# Patient Record
Sex: FEMALE | Race: WHITE | NOT HISPANIC OR LATINO | Employment: OTHER | URBAN - METROPOLITAN AREA
[De-identification: names, ages, dates, MRNs, and addresses within clinical notes are randomized per-mention and may not be internally consistent; named-entity substitution may affect disease eponyms.]

---

## 2017-01-30 ENCOUNTER — TELEPHONE (OUTPATIENT)
Dept: OTOLARYNGOLOGY | Facility: CLINIC | Age: 77
End: 2017-01-30

## 2017-01-30 NOTE — TELEPHONE ENCOUNTER
Reason for Call:  Other Face CT Scan.     Detailed comments: Patient requesting her face CT scan results to be sent to her neurologist  at [facility] in [place] . Please call patient if questions.     Phone Number Patient can be reached at: Home number on file [phone] (home)    Best Time: any    Can we leave a detailed message on this number? YES    Call taken on 1/30/2017 at 11:28 AM by Kelly Trevino

## 2017-02-20 ENCOUNTER — TELEPHONE (OUTPATIENT)
Dept: FAMILY MEDICINE | Facility: CLINIC | Age: 77
End: 2017-02-20

## 2017-02-20 NOTE — TELEPHONE ENCOUNTER
Reason for Call:  Other     Detailed comments: Patient has had a cold/cough for one week and has been taking emergence C, would like to know what cold tablets she can take with diagnosis of parkinson     Phone Number Patient can be reached at: Home number on file [phone] (home)    Best Time:     Can we leave a detailed message on this number? Not Applicable    Call taken on 2/20/2017 at 10:32 AM by Huyen Montelongo

## 2017-02-20 NOTE — TELEPHONE ENCOUNTER
Used micromedex for dayquil & psedophed as pharmacy wasn't able to help her & they both appear ok with sinemet.  Kelsey Arenas RN

## 2017-06-07 ENCOUNTER — TELEPHONE (OUTPATIENT)
Dept: FAMILY MEDICINE | Facility: CLINIC | Age: 77
End: 2017-06-07

## 2017-06-07 DIAGNOSIS — K21.9 GASTROESOPHAGEAL REFLUX DISEASE WITHOUT ESOPHAGITIS: Primary | ICD-10-CM

## 2017-06-08 NOTE — TELEPHONE ENCOUNTER
This patient is overdue for advised follow up, which is why he/she is out of refills.  Overdue for annual wellness exam.  I do not want this patient to go without medication - so one refill has been provided to allow time for appointment scheduling.  Please notify the patient and assist with scheduling follow up with her PCP.

## 2017-06-28 DIAGNOSIS — N39.41 URGENCY INCONTINENCE: ICD-10-CM

## 2017-06-28 RX ORDER — OXYBUTYNIN CHLORIDE 10 MG/1
TABLET, EXTENDED RELEASE ORAL
Qty: 90 TABLET | Refills: 1 | Status: SHIPPED | OUTPATIENT
Start: 2017-06-28 | End: 2017-07-21

## 2017-06-28 NOTE — TELEPHONE ENCOUNTER
Medication Detail      Disp Refills Start End ALISHA   oxybutynin (DITROPAN XL) 10 MG 24 hr tablet 90 tablet 3 4/13/2016  No   Sig: Take 1 tablet (10 mg) by mouth daily   Class: E-Prescribe   Route: Oral   Order: [id number]       Last Office Visit with [email or web address], [facility] or [facility] prescribing provider: 10/28/2016                                         Next 5 appointments (look out 90 days)     Jul 21, 2017 11:20 AM CDT   PHYS[facility] with Magaly Hamilton MD   [facility]    [address] [place] [id number] [phone]

## 2017-07-21 ENCOUNTER — OFFICE VISIT (OUTPATIENT)
Dept: FAMILY MEDICINE | Facility: CLINIC | Age: 77
End: 2017-07-21
Payer: COMMERCIAL

## 2017-07-21 VITALS
WEIGHT: 156 LBS | TEMPERATURE: 98.9 F | DIASTOLIC BLOOD PRESSURE: 90 MMHG | SYSTOLIC BLOOD PRESSURE: 138 MMHG | HEIGHT: 64 IN | BODY MASS INDEX: 26.63 KG/M2 | HEART RATE: 80 BPM

## 2017-07-21 DIAGNOSIS — E83.51 HYPOCALCEMIA: ICD-10-CM

## 2017-07-21 DIAGNOSIS — R53.83 OTHER FATIGUE: ICD-10-CM

## 2017-07-21 DIAGNOSIS — K21.9 GASTROESOPHAGEAL REFLUX DISEASE WITHOUT ESOPHAGITIS: ICD-10-CM

## 2017-07-21 DIAGNOSIS — N39.41 URGENCY INCONTINENCE: ICD-10-CM

## 2017-07-21 DIAGNOSIS — Z00.01 ENCOUNTER FOR PREVENTATIVE ADULT HEALTH CARE EXAM WITH ABNORMAL FINDINGS: Primary | ICD-10-CM

## 2017-07-21 DIAGNOSIS — F32.1 MODERATE SINGLE CURRENT EPISODE OF MAJOR DEPRESSIVE DISORDER (H): ICD-10-CM

## 2017-07-21 DIAGNOSIS — Z13.6 CARDIOVASCULAR SCREENING; LDL GOAL LESS THAN 160: ICD-10-CM

## 2017-07-21 DIAGNOSIS — G20.A1 PARKINSON'S DISEASE (H): ICD-10-CM

## 2017-07-21 DIAGNOSIS — I10 HYPERTENSION GOAL BP (BLOOD PRESSURE) < 140/90: ICD-10-CM

## 2017-07-21 DIAGNOSIS — K55.9 ISCHEMIC COLITIS (H): ICD-10-CM

## 2017-07-21 LAB
BASOPHILS # BLD AUTO: 0 10E9/L (ref 0–0.2)
BASOPHILS NFR BLD AUTO: 0.3 %
DIFFERENTIAL METHOD BLD: NORMAL
EOSINOPHIL # BLD AUTO: 0.1 10E9/L (ref 0–0.7)
EOSINOPHIL NFR BLD AUTO: 0.7 %
ERYTHROCYTE [DISTWIDTH] IN BLOOD BY AUTOMATED COUNT: 13.7 % (ref 10–15)
HCT VFR BLD AUTO: 39.5 % (ref 35–47)
HGB BLD-MCNC: 13.2 G/DL (ref 11.7–15.7)
LYMPHOCYTES # BLD AUTO: 1.8 10E9/L (ref 0.8–5.3)
LYMPHOCYTES NFR BLD AUTO: 24 %
MCH RBC QN AUTO: 31.3 PG (ref 26.5–33)
MCHC RBC AUTO-ENTMCNC: 33.4 G/DL (ref 31.5–36.5)
MCV RBC AUTO: 94 FL (ref 78–100)
MONOCYTES # BLD AUTO: 0.6 10E9/L (ref 0–1.3)
MONOCYTES NFR BLD AUTO: 8.1 %
NEUTROPHILS # BLD AUTO: 5.1 10E9/L (ref 1.6–8.3)
NEUTROPHILS NFR BLD AUTO: 66.9 %
PLATELET # BLD AUTO: 169 10E9/L (ref 150–450)
RBC # BLD AUTO: 4.22 10E12/L (ref 3.8–5.2)
WBC # BLD AUTO: 7.6 10E9/L (ref 4–11)

## 2017-07-21 PROCEDURE — 80050 GENERAL HEALTH PANEL: CPT | Performed by: FAMILY MEDICINE

## 2017-07-21 PROCEDURE — 99397 PER PM REEVAL EST PAT 65+ YR: CPT | Performed by: FAMILY MEDICINE

## 2017-07-21 PROCEDURE — 99213 OFFICE O/P EST LOW 20 MIN: CPT | Mod: 25 | Performed by: FAMILY MEDICINE

## 2017-07-21 PROCEDURE — 80061 LIPID PANEL: CPT | Performed by: FAMILY MEDICINE

## 2017-07-21 PROCEDURE — 36415 COLL VENOUS BLD VENIPUNCTURE: CPT | Performed by: FAMILY MEDICINE

## 2017-07-21 RX ORDER — OXYBUTYNIN CHLORIDE 10 MG/1
TABLET, EXTENDED RELEASE ORAL
Qty: 90 TABLET | Refills: 3 | Status: SHIPPED | OUTPATIENT
Start: 2017-07-21 | End: 2018-08-17

## 2017-07-21 ASSESSMENT — ANXIETY QUESTIONNAIRES
GAD7 TOTAL SCORE: 5
3. WORRYING TOO MUCH ABOUT DIFFERENT THINGS: MORE THAN HALF THE DAYS
6. BECOMING EASILY ANNOYED OR IRRITABLE: SEVERAL DAYS
IF YOU CHECKED OFF ANY PROBLEMS ON THIS QUESTIONNAIRE, HOW DIFFICULT HAVE THESE PROBLEMS MADE IT FOR YOU TO DO YOUR WORK, TAKE CARE OF THINGS AT HOME, OR GET ALONG WITH OTHER PEOPLE: NOT DIFFICULT AT ALL
5. BEING SO RESTLESS THAT IT IS HARD TO SIT STILL: NOT AT ALL
7. FEELING AFRAID AS IF SOMETHING AWFUL MIGHT HAPPEN: NOT AT ALL
1. FEELING NERVOUS, ANXIOUS, OR ON EDGE: NOT AT ALL
2. NOT BEING ABLE TO STOP OR CONTROL WORRYING: MORE THAN HALF THE DAYS

## 2017-07-21 ASSESSMENT — PATIENT HEALTH QUESTIONNAIRE - PHQ9: 5. POOR APPETITE OR OVEREATING: NOT AT ALL

## 2017-07-21 NOTE — PROGRESS NOTES
SUBJECTIVE:   Laurie Orosco is a 76 year old female who presents for Preventive Visit.  Are you in the first 12 months of your Medicare Part B coverage?  No    Healthy Habits:    Do you get at least three servings of calcium containing foods daily (dairy, green leafy vegetables, etc.)? yes    Amount of exercise or daily activities, outside of work: 0 day(s) per week    Problems taking medications regularly No    Medication side effects: No    Have you had an eye exam in the past two years? yes    Do you see a dentist twice per year? yes    Do you have sleep apnea, excessive snoring or daytime drowsiness?no    COGNITIVE SCREEN  1) Repeat 3 items (Banana, Sunrise, Chair)    2) Clock draw: ABNORMAL   3) 3 item recall: Recalls 3 objects  Results: 3 items recalled: COGNITIVE IMPAIRMENT LESS LIKELY    Mini-CogTM Copyright S Jacky. Licensed by the author for use in [facility] [email or web address].[email or web address]; reprinted with permission ([email or web address]). All rights reserved.      Hypertension Follow-up      Outpatient blood pressures are not being checked.    Low Salt Diet: no added salt      * fatigue all the times and dros        Hypertension:     Patient complains about being fatigue all the time. She believes that it is due to parkinson disease or her medications. She will be seeing Dr. Sandoval on Fall. She stated that she has no problems when walking as long as she takes her medications daily. A few months a ago, she used to take up in the middle of the night about 1-2 nights a week. However, about two months ago when she moved to a different place, her sleeping patterns has improved. She has been having problems with her appetite. She eats less and believes that it could be due to the Parkinson's medication that she is taking.     Patient also stated that she is having problems with her colitis. She is gettinf loose stool and cramps when she goes which lasts about 4 hours. Feels overwhelmed and stressed. She has to wait about an hour and  rest before she can go out.     She states that she is having problems with incontinence due to taking Oxybutynin. She has been wearing depend for about two years now. Discussed lowering dose, but patient wants to stay with current dose.     She reports no falls in the last month. Patient is constantly watching how she walks.     Denies any vision and hearing changes.           Reviewed and updated as needed this visit by clinical staffTobacco  Allergies  Meds  Med Hx  Surg Hx  Fam Hx  Soc Hx        Reviewed and updated as needed this visit by Provider        Social History   Substance Use Topics     Smoking status: Former Smoker     Types: Cigarettes     Quit date: 3/30/1960     Smokeless tobacco: Never Used      Comment: Smoked cigarettes for one year when she was 22 years old.     Alcohol use Yes      Comment: 1 GLASS OF WINE EVERY 1-2 WEEKS       The patient does not drink >3 drinks per day nor >7 drinks per week.    Today's PHQ-2 Score:   PHQ-2 ( 1999 [employer]) 10/28/2016 1/28/2015   Q1: Little interest or pleasure in doing things 0 1   Q2: Feeling down, depressed or hopeless 0 0   PHQ-2 Score 0 1       Do you feel safe in your environment - Yes    Do you have a Health Care Directive?: Yes: Advance Directive has been received and scanned.    Current providers sharing in care for this patient include:   Patient Care Team:  Magaly Hamilton MD as PCP - General (Family Practice)      Hearing impairment: No    Ability to successfully perform activities of daily living: Yes, no assistance needed     Fall risk:  Fallen 2 or more times in the past year?: No  Any fall with injury in the past year?: No      Home safety:  none identified      The following health maintenance items are reviewed in Epic and correct as of today:  Health Maintenance   Topic Date Due     PHQ-9 Q6 MONTHS  10/13/2016     FALL RISK ASSESSMENT  04/13/2017     LIPID MONITORING Q1 YEAR  04/13/2017     INFLUENZA VACCINE (SYSTEM ASSIGNED)   "09/01/2017     TETANUS IMMUNIZATION (SYSTEM ASSIGNED)  09/17/2017     MAMMO SCREEN Q2 YR (SYSTEM ASSIGNED)  02/22/2018     ADVANCE DIRECTIVE PLANNING Q5 YRS  07/19/2021     COLON CANCER SCREEN (SYSTEM ASSIGNED)  03/29/2022     MIGRAINE ACTION PLAN  Completed     DEXA SCAN SCREENING (SYSTEM ASSIGNED)  Completed     PNEUMOCOCCAL  Completed         Mammogram Screening: Patient over age 50, mutual decision to screen reflected in health maintenance.        ROS:    Constitutional, HEENT, cardiovascular, pulmonary, GI, , musculoskeletal, neuro, skin, endocrine and psych systems are negative, except as otherwise noted.    This document serves as a record of the services and decisions personally performed and made by Magaly Hamilton MD. It was created on his/her behalf by Belle Clifton, trained medical scribe. The creation of this document is based the provider's statements to the medical scribes.    Scribrosetta Clifton 12:41 PM, July 21, 2017      OBJECTIVE:   /90  Pulse 80  Temp 98.9  F (37.2  C) (Oral)  Ht 5' 3.5\" (1.613 m)  Wt 156 lb (70.8 kg)  Breastfeeding? No  BMI 27.2 kg/m2 Estimated body mass index is 27.2 kg/(m^2) as calculated from the following:    Height as of this encounter: 5' 3.5\" (1.613 m).    Weight as of this encounter: 156 lb (70.8 kg).  EXAM:   GENERAL APPEARANCE: healthy, alert and no distress  EYES: Eyes grossly normal to inspection, PERRL and conjunctivae and sclerae normal  HENT: ear canals and TM's normal, nose and mouth without ulcers or lesions, oropharynx clear and oral mucous membranes moist  NECK: no adenopathy, no asymmetry, masses, or scars and thyroid normal to palpation  RESP: lungs clear to auscultation - no rales, rhonchi or wheezes  BREAST: normal without masses, tenderness or nipple discharge and no palpable axillary masses or adenopathy  CV: regular rate and rhythm, normal S1 S2, no S3 or S4, no murmur, click or rub, no peripheral edema and peripheral pulses " strong  ABDOMEN: soft, nontender, no hepatosplenomegaly, no masses and bowel sounds normal  MS: no musculoskeletal defects are noted and gait is age appropriate without ataxia  SKIN: no suspicious lesions or rashes  NEURO: Normal strength and tone, sensory exam grossly normal, mentation intact and speech normal  PSYCH: mentation appears normal and affect normal/bright    ASSESSMENT / PLAN:   (Z00.01) Encounter for preventative adult health care exam with abnormal findings  (primary encounter diagnosis)  Comment:    (K21.9) Gastroesophageal reflux disease without esophagitis  Comment: well controlled, will stop Omeprazole and started Ranitidine.   Plan: ranitidine (ZANTAC) 300 MG tablet          (G20) Parkinson's disease (H)  Comment: Follow by neurology Dr. Joya    (K55.9) Ischemic colitis (H)  Plan: If colitis persists, will refer to GI specialist.  Last colonoscopy 2012    (I10) Hypertension goal BP (blood pressure) < 140/90  Comment: Chronic, stable, well controlled, continue current medication, refill done if needed    (N39.41) Urgency incontinence  Comment: discussed lower dose of Oxybutynin, Pt prefers to stay with current dose.  Plan: oxybutynin (DITROPAN-XL) 10 MG 24 hr tablet        Reviewed risks of medication    (R53.83) Other fatigue  Comment: Could be related to medications or Parkinson disease.   Plan: TSH with free T4 reflex, CBC with platelets         differential, Comprehensive metabolic panel         (BMP + Alb, Alk Phos, ALT, AST, Total. Bili,         TP)        Referred to a neurologist and psychiatrist     (Z13.6) CARDIOVASCULAR SCREENING; LDL GOAL LESS THAN 160  Plan: Lipid panel reflex to direct LDL           (F32.1) Moderate single current episode of major depressive disorder (H)  Comment: chronic and followed by a psychiatry  Plan: FLUoxetine (PROZAC) 20 MG capsule              End of Life Planning:  Patient currently has an advanced directive: Yes.  Practitioner is supportive of  "decision.    COUNSELING:  Reviewed preventive health counseling, as reflected in patient instructions       Regular exercise       Healthy diet/nutrition       Vision screening       Hearing screening       Dental care       Osteoporosis Prevention/Bone Health       The 10-year ASCVD risk score (Calvin BRODERICK Jr, et al., 2013) is: 16.3%    Values used to calculate the score:      Age: 76 years      Sex: Female      Is Non- : No      Diabetic: No      Tobacco smoker: No      Systolic Blood Pressure: 138 mmHg      Is BP treated: No      HDL Cholesterol: 64 mg/dL      Total Cholesterol: 183 mg/dL       Advanced Planning         Estimated body mass index is 27.2 kg/(m^2) as calculated from the following:    Height as of this encounter: 5' 3.5\" (1.613 m).    Weight as of this encounter: 156 lb (70.8 kg).     reports that she quit smoking about 57 years ago. Her smoking use included Cigarettes. She has never used smokeless tobacco.        Appropriate preventive services were discussed with this patient, including applicable screening as appropriate for cardiovascular disease, diabetes, osteopenia/osteoporosis, and glaucoma.  As appropriate for age/gender, discussed screening for colorectal cancer, prostate cancer, breast cancer, and cervical cancer. Checklist reviewing preventive services available has been given to the patient.    Reviewed patients plan of care and provided an AVS. The Basic Care Plan (routine screening as documented in Health Maintenance) for Laurie meets the Care Plan requirement. This Care Plan has been established and reviewed with the Patient.    Counseling Resources:  ATP IV Guidelines  Pooled Cohorts Equation Calculator  Breast Cancer Risk Calculator  FRAX Risk Assessment  ICSI Preventive Guidelines  Dietary Guidelines for Americans, 2010  USDA's MyPlate  ASA Prophylaxis  Lung CA Screening      The information in this document, created by the medical scribe for me, accurately " reflects the services I personally performed and the decisions made by me. I have reviewed and approved this document for accuracy prior to leaving the patient care area.  Magaly Hamilton MD  12:41 PM, 07/21/17    Magaly Haimlton MD  [facility]

## 2017-07-21 NOTE — LETTER
[facility]  [address] [place] [id number]  [phone]      October 30, 2017      Laurie Orosco  [address]  [address]  [place] [address]          Dear Laurie Orosco:    This is to remind you that your provider wanted you to return to the clinic for lab testing.    If you are coming in for Lipids and/or Glucose testing please fast for 10-12 hours. Morning medications can be taken with water.    You may call our office at [phone] to schedule an appointment.    Please disregard this notice if you have already had your labs drawn or made an            appointment.        Sincerely,    Magaly Hamilton MD

## 2017-07-21 NOTE — LETTER
July 26, 2017        Laurie Orosco  [address] [address]        Dear Laurie,     Your labs are all stable other than a low calcium level.  I will want you to repeat this test again in 4-6 weeks, non fasting.  Your LDL cholesterol has increased slightly since the last check, but overall looks stable.   Results for orders placed or performed in visit on 07/21/17   Lipid panel reflex to direct LDL   Result Value Ref Range    Cholesterol 198 <200 mg/dL    Triglycerides 69 <150 mg/dL    HDL Cholesterol 68 >49 mg/dL    LDL Cholesterol Calculated 116 (H) <100 mg/dL    Non HDL Cholesterol 130 (H) <130 mg/dL   TSH with free T4 reflex   Result Value Ref Range    TSH 2.89 0.40 - 4.00 mU/L   CBC with platelets differential   Result Value Ref Range    WBC 7.6 4.0 - 11.0 10e9/L    RBC Count 4.22 3.8 - 5.2 10e12/L    Hemoglobin 13.2 11.7 - 15.7 g/dL    Hematocrit 39.5 35.0 - 47.0 %    MCV 94 78 - 100 fl    MCH 31.3 26.5 - 33.0 pg    MCHC 33.4 31.5 - 36.5 g/dL    RDW 13.7 10.0 - 15.0 %    Platelet Count 169 150 - 450 10e9/L    Diff Method Automated Method     % Neutrophils 66.9 %    % Lymphocytes 24.0 %    % Monocytes 8.1 %    % Eosinophils 0.7 %    % Basophils 0.3 %    Absolute Neutrophil 5.1 1.6 - 8.3 10e9/L    Absolute Lymphocytes 1.8 0.8 - 5.3 10e9/L    Absolute Monocytes 0.6 0.0 - 1.3 10e9/L    Absolute Eosinophils 0.1 0.0 - 0.7 10e9/L    Absolute Basophils 0.0 0.0 - 0.2 10e9/L   Comprehensive metabolic panel (BMP + Alb, Alk Phos, ALT, AST, Total. Bili, TP)   Result Value Ref Range    Sodium 138 133 - 144 mmol/L    Potassium 4.2 3.4 - 5.3 mmol/L    Chloride 105 94 - 109 mmol/L    Carbon Dioxide 28 20 - 32 mmol/L    Anion Gap 5 3 - 14 mmol/L    Glucose 86 70 - 99 mg/dL    Urea Nitrogen 17 7 - 30 mg/dL    Creatinine 0.64 0.52 - 1.04 mg/dL    GFR Estimate >90  Non  GFR Calc   >60 mL/min/1.7m2    GFR Estimate If Black >90   GFR Calc   >60 mL/min/1.7m2    Calcium  8.2 (L) 8.5 - 10.1 mg/dL    Bilirubin Total 0.3 0.2 - 1.3 mg/dL    Albumin 3.6 3.4 - 5.0 g/dL    Protein Total 6.9 6.8 - 8.8 g/dL    Alkaline Phosphatase 97 40 - 150 U/L    ALT 9 0 - 50 U/L    AST 20 0 - 45 U/L       Sincerely,        Magaly Hamilton MD/kj

## 2017-07-21 NOTE — PATIENT INSTRUCTIONS
- talk with your neurologist and psychiatrist about fatigue  - Stop omeprazole, and start ranitidine for acid reflux.   -If your colitis persists, I will send you to the GI specialist, let me know      Preventive Health Recommendations    Female Ages 65 +    Yearly exam:     See your health care provider every year in order to  o Review health changes.   o Discuss preventive care.    o Review your medicines if your doctor has prescribed any.      You no longer need a yearly Pap test unless you've had an abnormal Pap test in the past 10 years. If you have vaginal symptoms, such as bleeding or discharge, be sure to talk with your provider about a Pap test.      Every 1 to 2 years, have a mammogram.  If you are over 69, talk with your health care provider about whether or not you want to continue having screening mammograms.      Every 10 years, have a colonoscopy. Or, have a yearly FIT test (stool test). These exams will check for colon cancer.       Have a cholesterol test every 5 years, or more often if your doctor advises it.       Have a diabetes test (fasting glucose) every three years. If you are at risk for diabetes, you should have this test more often.       At age 65, have a bone density scan (DEXA) to check for osteoporosis (brittle bone disease).    Shots:    Get a flu shot each year.    Get a tetanus shot every 10 years.    Talk to your doctor about your pneumonia vaccines. There are now two you should receive - Pneumovax (PPSV 23) and Prevnar (PCV 13).    Talk to your doctor about the shingles vaccine.    Talk to your doctor about the hepatitis B vaccine.    Nutrition:     Eat at least 5 servings of fruits and vegetables each day.      Eat whole-grain bread, whole-wheat pasta and brown rice instead of white grains and rice.      Talk to your provider about Calcium and Vitamin D.     Lifestyle    Exercise at least 150 minutes a week (30 minutes a day, 5 days a week). This will help you control your weight  and prevent disease.      Limit alcohol to one drink per day.      No smoking.       Wear sunscreen to prevent skin cancer.       See your dentist twice a year for an exam and cleaning.      See your eye doctor every 1 to 2 years to screen for conditions such as glaucoma, macular degeneration and cataracts.

## 2017-07-21 NOTE — NURSING NOTE
"Chief Complaint   Patient presents with     Wellness Visit       Initial /90  Pulse 80  Temp 98.9  F (37.2  C) (Oral)  Ht 5' 3.5\" (1.613 m)  Wt 156 lb (70.8 kg)  Breastfeeding? No  BMI 27.2 kg/m2 Estimated body mass index is 27.2 kg/(m^2) as calculated from the following:    Height as of this encounter: 5' 3.5\" (1.613 m).    Weight as of this encounter: 156 lb (70.8 kg).  Medication Reconciliation: complete    "

## 2017-07-21 NOTE — MR AVS SNAPSHOT
After Visit Summary   7/21/2017    Laurie Orosco    MRN: [id number]           Patient Information     Date Of Birth          1940        Visit Information        Provider Department      7/21/2017 11:20 AM Magaly Hamilton MD [facility]        Today's Diagnoses     Encounter for preventative adult health care exam with abnormal findings    -  1    Gastroesophageal reflux disease without esophagitis        Parkinson's disease (H)        Ischemic colitis (H)        Hypertension goal BP (blood pressure) < 140/90        Urgency incontinence        Other fatigue        CARDIOVASCULAR SCREENING; LDL GOAL LESS THAN 160        Moderate single current episode of major depressive disorder (H)          Care Instructions    - talk with your neurologist and psychiatrist about fatigue  - Stop omeprazole, and start ranitidine for acid reflux.   -If your colitis persists, I will send you to the GI specialist, let me know      Preventive Health Recommendations    Female Ages 65 +    Yearly exam:     See your health care provider every year in order to  o Review health changes.   o Discuss preventive care.    o Review your medicines if your doctor has prescribed any.      You no longer need a yearly Pap test unless you've had an abnormal Pap test in the past 10 years. If you have vaginal symptoms, such as bleeding or discharge, be sure to talk with your provider about a Pap test.      Every 1 to 2 years, have a mammogram.  If you are over 69, talk with your health care provider about whether or not you want to continue having screening mammograms.      Every 10 years, have a colonoscopy. Or, have a yearly FIT test (stool test). These exams will check for colon cancer.       Have a cholesterol test every 5 years, or more often if your doctor advises it.       Have a diabetes test (fasting glucose) every three years. If you are at risk for diabetes, you should have this test more often.        At age 65, have a bone density scan (DEXA) to check for osteoporosis (brittle bone disease).    Shots:    Get a flu shot each year.    Get a tetanus shot every 10 years.    Talk to your doctor about your pneumonia vaccines. There are now two you should receive - Pneumovax (PPSV 23) and Prevnar (PCV 13).    Talk to your doctor about the shingles vaccine.    Talk to your doctor about the hepatitis B vaccine.    Nutrition:     Eat at least 5 servings of fruits and vegetables each day.      Eat whole-grain bread, whole-wheat pasta and brown rice instead of white grains and rice.      Talk to your provider about Calcium and Vitamin D.     Lifestyle    Exercise at least 150 minutes a week (30 minutes a day, 5 days a week). This will help you control your weight and prevent disease.      Limit alcohol to one drink per day.      No smoking.       Wear sunscreen to prevent skin cancer.       See your dentist twice a year for an exam and cleaning.      See your eye doctor every 1 to 2 years to screen for conditions such as glaucoma, macular degeneration and cataracts.          Follow-ups after your visit        Who to contact     If you have questions or need follow up information about today's clinic visit or your schedule please contact [facility] directly at [phone].  Normal or non-critical lab and imaging results will be communicated to you by introNetworkshart, letter or phone within 4 business days after the clinic has received the results. If you do not hear from us within 7 days, please contact the clinic through Graphiclyt or phone. If you have a critical or abnormal lab result, we will notify you by phone as soon as possible.  Submit refill requests through [employer] or call your pharmacy and they will forward the refill request to us. Please allow 3 business days for your refill to be completed.          Additional Information About Your Visit        [employer] Information     [employer] lets you send  "messages to your doctor, view your test results, renew your prescriptions, schedule appointments and more. To sign up, go to [email or web address] . Click on \"Log in\" on the left side of the screen, which will take you to the Welcome page. Then click on \"Sign up Now\" on the right side of the page.     You will be asked to enter the access code listed below, as well as some personal information. Please follow the directions to create your username and password.     Your access code is: [email or web address]  Expires: 10/19/2017 12:37 PM     Your access code will  in 90 days. If you need help or a new code, please call your [facility] or [phone].        Care EveryWhere ID     This is your Care EveryWhere ID. This could be used by other organizations to access your [facility] medical records  [id number]        Your Vitals Were     Pulse Temperature Height Breastfeeding? BMI (Body Mass Index)       80 98.9  F (37.2  C) (Oral) 5' 3.5\" (1.613 m) No 27.2 kg/m2        Blood Pressure from Last 3 Encounters:   17 138/90   10/28/16 110/68   10/07/16 106/62    Weight from Last 3 Encounters:   17 156 lb (70.8 kg)   16 160 lb (72.6 kg)   10/28/16 160 lb (72.6 kg)              We Performed the Following     CBC with platelets differential     Comprehensive metabolic panel (BMP + Alb, Alk Phos, ALT, AST, Total. Bili, TP)     Lipid panel reflex to direct LDL     TSH with free T4 reflex          Today's Medication Changes          These changes are accurate as of: 17 12:37 PM.  If you have any questions, ask your nurse or doctor.               Start taking these medicines.        Dose/Directions    ranitidine 300 MG tablet   Commonly known as:  ZANTAC   Used for:  Gastroesophageal reflux disease without esophagitis   Started by:  Magaly Hamilton MD        Dose:  300 mg   Take 1 tablet (300 mg) by mouth At Bedtime   Quantity:  90 tablet   Refills:  3         These medicines have changed or have " updated prescriptions.        Dose/Directions    FLUoxetine 20 MG capsule   Commonly known as:  PROzac   This may have changed:  See the new instructions.   Used for:  Moderate single current episode of major depressive disorder (H)   Changed by:  Magaly Hamilton MD        Dose:  20 mg   Take 1 capsule (20 mg) by mouth daily Prescribed by psychiatrist   Quantity:  1 capsule   Refills:  0       oxybutynin 10 MG 24 hr tablet   Commonly known as:  DITROPAN-XL   This may have changed:  See the new instructions.   Used for:  Urgency incontinence   Changed by:  Magaly Hamilton MD        TAKE 1 TABLET (10 MG) BY MOUTH DAILY   Quantity:  90 tablet   Refills:  3         Stop taking these medicines if you haven't already. Please contact your care team if you have questions.     conjugated estrogens cream   Commonly known as:  PREMARIN   Stopped by:  Magaly Hamilton MD           omeprazole 20 MG CR capsule   Commonly known as:  priLOSEC   Stopped by:  Magaly Hamilton MD           order for DME   Stopped by:  Magaly Hamilton MD                Where to get your medicines      These medications were sent to [facility]/pharmacy #[id number] - [place], [address] CORNER OF [address], [facility] [address]     Phone:  [phone]     oxybutynin 10 MG 24 hr tablet    ranitidine 300 MG tablet                Primary Care Provider Office Phone # Fax #    Magaly Hamilton [phone]       [facility] [address] [place] [address]        Equal Access to Services     [facility]: Hadii zion whitto Sojamee, waaxda luqadaha, qaybta kaalmada adeegyada, akash preciado. So [facility] [phone].    ATENCIÓN: Si habla español, tiene a jim disposición servicios gratuitos de asistencia lingüística. Llame al [phone].    We comply with applicable federal civil rights laws and [place] laws. We do not  discriminate on the basis of race, color, national origin, age, disability sex, sexual orientation or gender identity.            Thank you!     Thank you for choosing [facility]  for your care. Our goal is always to provide you with excellent care. Hearing back from our patients is one way we can continue to improve our services. Please take a few minutes to complete the written survey that you may receive in the mail after your visit with us. Thank you!             Your Updated Medication List - Protect others around you: Learn how to safely use, store and throw away your medicines at www.disposemymeds.org.          This list is accurate as of: 7/21/17 12:37 PM.  Always use your most recent med list.                   Brand Name Dispense Instructions for use Diagnosis    amitriptyline 50 MG tablet    ELAVIL     3 TABLETS AT BEDTIME        ASPIRIN PO           butalbital-aspirin-caffeine capsule    FIORINAL    30 capsule    Take 1 capsule by mouth every 4 hours as needed for pain.    Migraine       CALCIUM + D PO      1 CAPSULE DAILY        carbidopa-levodopa  MG per tablet    SINEMET     Take 1 tablet by mouth 3 times daily        CITRUCEL 500 MG Tabs tablet   Generic drug:  methylcellulose      2 TABLETS IN THE MORNING.        FLUoxetine 20 MG capsule    PROzac    1 capsule    Take 1 capsule (20 mg) by mouth daily Prescribed by psychiatrist    Moderate single current episode of major depressive disorder (H)       oxybutynin 10 MG 24 hr tablet    DITROPAN-XL    90 tablet    TAKE 1 TABLET (10 MG) BY MOUTH DAILY    Urgency incontinence       ranitidine 300 MG tablet    ZANTAC    90 tablet    Take 1 tablet (300 mg) by mouth At Bedtime    Gastroesophageal reflux disease without esophagitis       RISPERDAL 2 MG tablet   Generic drug:  risperiDONE      1 TABLET AT BEDTIME

## 2017-07-22 LAB
ALBUMIN SERPL-MCNC: 3.6 G/DL (ref 3.4–5)
ALP SERPL-CCNC: 97 U/L (ref 40–150)
ALT SERPL W P-5'-P-CCNC: 9 U/L (ref 0–50)
ANION GAP SERPL CALCULATED.3IONS-SCNC: 5 MMOL/L (ref 3–14)
AST SERPL W P-5'-P-CCNC: 20 U/L (ref 0–45)
BILIRUB SERPL-MCNC: 0.3 MG/DL (ref 0.2–1.3)
BUN SERPL-MCNC: 17 MG/DL (ref 7–30)
CALCIUM SERPL-MCNC: 8.2 MG/DL (ref 8.5–10.1)
CHLORIDE SERPL-SCNC: 105 MMOL/L (ref 94–109)
CHOLEST SERPL-MCNC: 198 MG/DL
CO2 SERPL-SCNC: 28 MMOL/L (ref 20–32)
CREAT SERPL-MCNC: 0.64 MG/DL (ref 0.52–1.04)
GFR SERPL CREATININE-BSD FRML MDRD: ABNORMAL ML/MIN/1.7M2
GLUCOSE SERPL-MCNC: 86 MG/DL (ref 70–99)
HDLC SERPL-MCNC: 68 MG/DL
LDLC SERPL CALC-MCNC: 116 MG/DL
NONHDLC SERPL-MCNC: 130 MG/DL
POTASSIUM SERPL-SCNC: 4.2 MMOL/L (ref 3.4–5.3)
PROT SERPL-MCNC: 6.9 G/DL (ref 6.8–8.8)
SODIUM SERPL-SCNC: 138 MMOL/L (ref 133–144)
TRIGL SERPL-MCNC: 69 MG/DL
TSH SERPL DL<=0.005 MIU/L-ACNC: 2.89 MU/L (ref 0.4–4)

## 2017-07-22 ASSESSMENT — PATIENT HEALTH QUESTIONNAIRE - PHQ9: SUM OF ALL RESPONSES TO PHQ QUESTIONS 1-9: 12

## 2017-07-22 ASSESSMENT — ANXIETY QUESTIONNAIRES: GAD7 TOTAL SCORE: 5

## 2017-07-26 NOTE — PROGRESS NOTES
Send results letter.    Padmaja Roblesice,     Your labs are all stable other than a low calcium level.  I will want you to repeat this test again in 4-6 weeks, non fasting.  Your LDL cholesterol has increased slightly since the last check, but overall looks stable.  Magaly Hamilton MD

## 2017-08-16 ENCOUNTER — TELEPHONE (OUTPATIENT)
Dept: FAMILY MEDICINE | Facility: CLINIC | Age: 77
End: 2017-08-16

## 2017-08-16 NOTE — TELEPHONE ENCOUNTER
Panel Management Review      Patient has the following on her problem list:     Hypertension   Last three blood pressure readings:  BP Readings from Last 3 Encounters:   07/21/17 138/90   10/28/16 110/68   10/07/16 106/62     Blood pressure: Passed    HTN Guidelines:  Age 18-59 BP range:  Less than 140/90  Age 60-85 with Diabetes:  Less than 140/90  Age 60-85 without Diabetes:  less than 150/90        Composite cancer screening  Chart review shows that this patient is due/due soon for the following None  Summary:    Patient is due/failing the following:   none    Action needed:   none    Type of outreach:    None, routed to provider for review.    Questions for provider review:    None                                                                                                                                    John Graff       Chart routed to Care Team .

## 2017-08-24 ENCOUNTER — TRANSFERRED RECORDS (OUTPATIENT)
Dept: HEALTH INFORMATION MANAGEMENT | Facility: CLINIC | Age: 77
End: 2017-08-24

## 2017-08-24 LAB — PHQ9 SCORE: 10

## 2017-09-08 DIAGNOSIS — K21.9 GASTROESOPHAGEAL REFLUX DISEASE WITHOUT ESOPHAGITIS: ICD-10-CM

## 2017-09-08 NOTE — TELEPHONE ENCOUNTER
Edit       Summary: TAKE 1 TABLET (20 MG) BY MOUTH DAILY, Disp-90 capsule, R-0, E-Prescribe   Start: 6/8/2017  End: 7/21/2017  Ord/Sold: 6/8/2017 (O)  Report  Long-term:   Pharmacy: [facility]/pharmacy #[id number] - [place], [place] - [address] [facility] [address] OF [facility]  Med Dose History        Patient Sig: TAKE 1 TABLET (20 MG) BY MOUTH DAILY        Ordered on: 6/8/2017        Authorized by: LO VALDEZ        Dispense: 90 capsule        Discontinued On: 7/21/2017        DC Reason: Alternate therapy          Last Office Visit with G, P or [facility] prescribing provider: 7-  Future Office visit:       Routing refill request to provider for review/approval because:  Drug not active on patient's medication list

## 2017-09-19 ENCOUNTER — TRANSFERRED RECORDS (OUTPATIENT)
Dept: HEALTH INFORMATION MANAGEMENT | Facility: CLINIC | Age: 77
End: 2017-09-19

## 2017-11-09 ENCOUNTER — ALLIED HEALTH/NURSE VISIT (OUTPATIENT)
Dept: FAMILY MEDICINE | Facility: CLINIC | Age: 77
End: 2017-11-09
Payer: COMMERCIAL

## 2017-11-09 VITALS — HEART RATE: 81 BPM | DIASTOLIC BLOOD PRESSURE: 89 MMHG | SYSTOLIC BLOOD PRESSURE: 129 MMHG

## 2017-11-09 DIAGNOSIS — E83.51 HYPOCALCEMIA: ICD-10-CM

## 2017-11-09 DIAGNOSIS — Z01.30 BP CHECK: Primary | ICD-10-CM

## 2017-11-09 PROCEDURE — 82310 ASSAY OF CALCIUM: CPT | Performed by: FAMILY MEDICINE

## 2017-11-09 PROCEDURE — 99207 ZZC NO CHARGE NURSE ONLY: CPT | Performed by: FAMILY MEDICINE

## 2017-11-09 PROCEDURE — 36415 COLL VENOUS BLD VENIPUNCTURE: CPT | Performed by: FAMILY MEDICINE

## 2017-11-09 NOTE — MR AVS SNAPSHOT
"              After Visit Summary   2017    Laurie Orosco    MRN: [id number]           Patient Information     Date Of Birth          1940        Visit Information        Provider Department      2017 2:08 PM Magaly Hamilton MD [facility]        Today's Diagnoses     BP check    -  1       Follow-ups after your visit        Who to contact     If you have questions or need follow up information about today's clinic visit or your schedule please contact [facility] directly at [phone].  Normal or non-critical lab and imaging results will be communicated to you by Movayahart, letter or phone within 4 business days after the clinic has received the results. If you do not hear from us within 7 days, please contact the clinic through Movayahart or phone. If you have a critical or abnormal lab result, we will notify you by phone as soon as possible.  Submit refill requests through [employer] or call your pharmacy and they will forward the refill request to us. Please allow 3 business days for your refill to be completed.          Additional Information About Your Visit        MyChart Information     [employer] lets you send messages to your doctor, view your test results, renew your prescriptions, schedule appointments and more. To sign up, go to [email or web address] . Click on \"Log in\" on the left side of the screen, which will take you to the Welcome page. Then click on \"Sign up Now\" on the right side of the page.     You will be asked to enter the access code listed below, as well as some personal information. Please follow the directions to create your username and password.     Your access code is: [email or web address]  Expires: 2018  2:19 PM     Your access code will  in 90 days. If you need help or a new code, please call your [facility] or [phone].        Care EveryWhere ID     This is your Care EveryWhere ID. This could be used by other " organizations to access your [facility] medical records  [id number]        Your Vitals Were     Pulse                   81            Blood Pressure from Last 3 Encounters:   11/09/17 129/89   07/21/17 138/90   10/28/16 110/68    Weight from Last 3 Encounters:   07/21/17 156 lb (70.8 kg)   11/11/16 160 lb (72.6 kg)   10/28/16 160 lb (72.6 kg)              Today, you had the following     No orders found for display       Primary Care Provider Office Phone # Fax #    Magaly Hamilton [phone]       [address] [facility] [address]        Equal Access to Services     [facility] DUSTIN : Hadii zion alcaraz hadasho Sochrisali, waaxda luqadaha, qaybta kaalmada adeegyada, akash preciado. So [facility] [phone].    ATENCIÓN: Si habla español, tiene a jim disposición servicios gratuitos de asistencia lingüística. Llame al [phone].    We comply with applicable federal civil rights laws and [place] laws. We do not discriminate on the basis of race, color, national origin, age, disability, sex, sexual orientation, or gender identity.            Thank you!     Thank you for choosing [facility]  for your care. Our goal is always to provide you with excellent care. Hearing back from our patients is one way we can continue to improve our services. Please take a few minutes to complete the written survey that you may receive in the mail after your visit with us. Thank you!             Your Updated Medication List - Protect others around you: Learn how to safely use, store and throw away your medicines at www.disposemymeds.org.          This list is accurate as of: 11/9/17  2:19 PM.  Always use your most recent med list.                   Brand Name Dispense Instructions for use Diagnosis    amitriptyline 50 MG tablet    ELAVIL     3 TABLETS AT BEDTIME        ASPIRIN PO           butalbital-aspirin-caffeine capsule    FIORINAL    30 capsule    Take 1 capsule by  mouth every 4 hours as needed for pain.    Migraine       CALCIUM + D PO      1 CAPSULE DAILY        carbidopa-levodopa  MG per tablet    SINEMET     Take 1 tablet by mouth 3 times daily        CITRUCEL 500 MG Tabs tablet   Generic drug:  methylcellulose      2 TABLETS IN THE MORNING.        FLUoxetine 20 MG capsule    PROzac    1 capsule    Take 1 capsule (20 mg) by mouth daily Prescribed by psychiatrist    Moderate single current episode of major depressive disorder (H)       oxybutynin 10 MG 24 hr tablet    DITROPAN-XL    90 tablet    TAKE 1 TABLET (10 MG) BY MOUTH DAILY    Urgency incontinence       ranitidine 300 MG tablet    ZANTAC    90 tablet    Take 1 tablet (300 mg) by mouth At Bedtime    Gastroesophageal reflux disease without esophagitis       RISPERDAL 2 MG tablet   Generic drug:  risperiDONE      1 TABLET AT BEDTIME

## 2017-11-09 NOTE — PROGRESS NOTES
Laurie Orosco is enrolled/participating in the retail pharmacy Blood Pressure Goals Achievement Program (BPGAP).  Laurie Orosco was evaluated at [facility] on November 9, 2017 at which time her blood pressure was:    BP Readings from Last 3 Encounters:   11/09/17 129/89   07/21/17 138/90   10/28/16 110/68     Reviewed lifestyle modifications for blood pressure control and reduction: including making healthy food choices, managing weight, getting regular exercise, smoking cessation, reducing alcohol consumption, monitoring blood pressure regularly.     Laurie Orosco is not experiencing symptoms.    Follow-Up: BP is at goal of < 140/90mmHg (patient 18+ years of age with or without diabetes).  Recommended follow-up at pharmacy in 6 months.     Recommendation to Provider: none, patient at goal.    Laurie Orosco was evaluated for enrollment into the PGEN study today.    Patient eligible for enrollment:  No  Patient interested in enrollment:  Unknown    Completed by: Chris BernardD [facility], [facility] P4 Candidate 2018  [facility]  [phone]    Niecy Alex PharmD, [facility]  Pharmacist Manager   [facility]  [phone]

## 2017-11-09 NOTE — LETTER
November 14, 2017      Laurie Orosco  [address] [facility]  [address] [place] [id number]        Dear Laurie,     Your calcium is back in normal range. Enclosed is a copy of these results.  If you have any further questions or problems, please contact our office.     Results for orders placed or performed in visit on 11/09/17   Calcium   Result Value Ref Range    Calcium 8.9 8.5 - 10.1 mg/dL           Sincerely,        Magaly Hamilton MD/debi

## 2017-11-10 LAB — CALCIUM SERPL-MCNC: 8.9 MG/DL (ref 8.5–10.1)

## 2017-11-10 NOTE — PROGRESS NOTES
Send normal results letter.    Padmaja Sullivan,    Your calcium is back in normal range.  Magaly Hamilton MD

## 2017-11-20 ENCOUNTER — TRANSFERRED RECORDS (OUTPATIENT)
Dept: HEALTH INFORMATION MANAGEMENT | Facility: CLINIC | Age: 77
End: 2017-11-20

## 2018-01-03 ENCOUNTER — TRANSFERRED RECORDS (OUTPATIENT)
Dept: HEALTH INFORMATION MANAGEMENT | Facility: CLINIC | Age: 78
End: 2018-01-03

## 2018-01-11 ENCOUNTER — TELEPHONE (OUTPATIENT)
Dept: FAMILY MEDICINE | Facility: CLINIC | Age: 78
End: 2018-01-11

## 2018-01-11 ASSESSMENT — PATIENT HEALTH QUESTIONNAIRE - PHQ9: SUM OF ALL RESPONSES TO PHQ QUESTIONS 1-9: 2

## 2018-01-11 NOTE — TELEPHONE ENCOUNTER
Please repeat PHQ-9.  Index date: 7/21/17  Follow up start date:  12/21/17  Follow up end date:  2/21/18    Stephanie Magdaleno MA

## 2018-08-05 ENCOUNTER — TELEPHONE (OUTPATIENT)
Dept: FAMILY MEDICINE | Facility: CLINIC | Age: 78
End: 2018-08-05

## 2018-08-05 DIAGNOSIS — K21.9 GASTROESOPHAGEAL REFLUX DISEASE WITHOUT ESOPHAGITIS: ICD-10-CM

## 2018-08-06 NOTE — TELEPHONE ENCOUNTER
"Requested Prescriptions   Pending Prescriptions Disp Refills     ranitidine (ZANTAC) 300 MG tablet [Pharmacy Med Name: RANITIDINE 300 MG TABLET]  Last Written Prescription Date:  7/21/2017  Last Fill Quantity: 90 tablet,  # refills: 3   Last office visit: 7/21/2017 with prescribing provider:  MIMI Hamilton   Future Office Visit:     90 tablet 3     Sig: TAKE 1 TABLET (300 MG) BY MOUTH AT BEDTIME    H2 Blockers Protocol Failed    8/5/2018  7:45 PM       Failed - Recent (12 mo) or future (30 days) visit within the authorizing provider's specialty    Patient had office visit in the last 12 months or has a visit in the next 30 days with authorizing provider or within the authorizing provider's specialty.  See \"Patient Info\" tab in inbasket, or \"Choose Columns\" in Meds & Orders section of the refill encounter.           Passed - Patient is age 12 or older          "

## 2018-08-17 ENCOUNTER — OFFICE VISIT (OUTPATIENT)
Dept: FAMILY MEDICINE | Facility: CLINIC | Age: 78
End: 2018-08-17
Payer: COMMERCIAL

## 2018-08-17 VITALS
SYSTOLIC BLOOD PRESSURE: 108 MMHG | WEIGHT: 147.8 LBS | HEART RATE: 80 BPM | BODY MASS INDEX: 25.23 KG/M2 | HEIGHT: 64 IN | TEMPERATURE: 98.7 F | DIASTOLIC BLOOD PRESSURE: 70 MMHG

## 2018-08-17 DIAGNOSIS — N32.81 OVERACTIVE BLADDER: ICD-10-CM

## 2018-08-17 DIAGNOSIS — N39.41 URGENCY INCONTINENCE: ICD-10-CM

## 2018-08-17 DIAGNOSIS — Z13.220 SCREENING FOR HYPERLIPIDEMIA: ICD-10-CM

## 2018-08-17 DIAGNOSIS — F32.1 MODERATE SINGLE CURRENT EPISODE OF MAJOR DEPRESSIVE DISORDER (H): ICD-10-CM

## 2018-08-17 DIAGNOSIS — L82.1 SEBORRHEIC KERATOSES: ICD-10-CM

## 2018-08-17 DIAGNOSIS — G43.909 MIGRAINE WITHOUT STATUS MIGRAINOSUS, NOT INTRACTABLE, UNSPECIFIED MIGRAINE TYPE: ICD-10-CM

## 2018-08-17 DIAGNOSIS — K55.9 ISCHEMIC COLITIS (H): ICD-10-CM

## 2018-08-17 DIAGNOSIS — I26.99 PULMONARY EMBOLISM AND INFARCTION (H): ICD-10-CM

## 2018-08-17 DIAGNOSIS — K21.9 GASTROESOPHAGEAL REFLUX DISEASE WITHOUT ESOPHAGITIS: ICD-10-CM

## 2018-08-17 DIAGNOSIS — Z00.00 ROUTINE HISTORY AND PHYSICAL EXAMINATION OF ADULT: Primary | ICD-10-CM

## 2018-08-17 DIAGNOSIS — Z23 NEED FOR PROPHYLACTIC VACCINATION WITH TETANUS-DIPHTHERIA (TD): ICD-10-CM

## 2018-08-17 DIAGNOSIS — I82.4Z2 ACUTE VENOUS EMBOLISM AND THROMBOSIS OF DEEP VESSELS OF DISTAL END OF LEFT LOWER EXTREMITY (H): ICD-10-CM

## 2018-08-17 DIAGNOSIS — G20.A1 PARKINSON'S DISEASE (H): ICD-10-CM

## 2018-08-17 DIAGNOSIS — B35.1 ONYCHOMYCOSIS: ICD-10-CM

## 2018-08-17 LAB
BASOPHILS # BLD AUTO: 0 10E9/L (ref 0–0.2)
BASOPHILS NFR BLD AUTO: 0.3 %
DIFFERENTIAL METHOD BLD: NORMAL
EOSINOPHIL # BLD AUTO: 0.2 10E9/L (ref 0–0.7)
EOSINOPHIL NFR BLD AUTO: 2.5 %
ERYTHROCYTE [DISTWIDTH] IN BLOOD BY AUTOMATED COUNT: 13.6 % (ref 10–15)
HCT VFR BLD AUTO: 38.4 % (ref 35–47)
HGB BLD-MCNC: 12.9 G/DL (ref 11.7–15.7)
LYMPHOCYTES # BLD AUTO: 1.8 10E9/L (ref 0.8–5.3)
LYMPHOCYTES NFR BLD AUTO: 23.7 %
MCH RBC QN AUTO: 32.3 PG (ref 26.5–33)
MCHC RBC AUTO-ENTMCNC: 33.6 G/DL (ref 31.5–36.5)
MCV RBC AUTO: 96 FL (ref 78–100)
MONOCYTES # BLD AUTO: 0.7 10E9/L (ref 0–1.3)
MONOCYTES NFR BLD AUTO: 9.8 %
NEUTROPHILS # BLD AUTO: 4.8 10E9/L (ref 1.6–8.3)
NEUTROPHILS NFR BLD AUTO: 63.7 %
PLATELET # BLD AUTO: 204 10E9/L (ref 150–450)
RBC # BLD AUTO: 4 10E12/L (ref 3.8–5.2)
WBC # BLD AUTO: 7.6 10E9/L (ref 4–11)

## 2018-08-17 PROCEDURE — 90715 TDAP VACCINE 7 YRS/> IM: CPT | Performed by: NURSE PRACTITIONER

## 2018-08-17 PROCEDURE — 80053 COMPREHEN METABOLIC PANEL: CPT | Performed by: NURSE PRACTITIONER

## 2018-08-17 PROCEDURE — 85025 COMPLETE CBC W/AUTO DIFF WBC: CPT | Performed by: NURSE PRACTITIONER

## 2018-08-17 PROCEDURE — 36415 COLL VENOUS BLD VENIPUNCTURE: CPT | Performed by: NURSE PRACTITIONER

## 2018-08-17 PROCEDURE — 90471 IMMUNIZATION ADMIN: CPT | Performed by: NURSE PRACTITIONER

## 2018-08-17 PROCEDURE — G0439 PPPS, SUBSEQ VISIT: HCPCS | Performed by: NURSE PRACTITIONER

## 2018-08-17 PROCEDURE — 80061 LIPID PANEL: CPT | Performed by: NURSE PRACTITIONER

## 2018-08-17 RX ORDER — PROPRANOLOL HYDROCHLORIDE 10 MG/1
10 TABLET ORAL 2 TIMES DAILY
COMMUNITY
Start: 2018-08-17 | End: 2022-01-01

## 2018-08-17 RX ORDER — OXYBUTYNIN CHLORIDE 10 MG/1
TABLET, EXTENDED RELEASE ORAL
Qty: 90 TABLET | Refills: 3 | Status: SHIPPED | OUTPATIENT
Start: 2018-08-17 | End: 2019-10-31

## 2018-08-17 ASSESSMENT — ANXIETY QUESTIONNAIRES
GAD7 TOTAL SCORE: 2
5. BEING SO RESTLESS THAT IT IS HARD TO SIT STILL: NOT AT ALL
3. WORRYING TOO MUCH ABOUT DIFFERENT THINGS: SEVERAL DAYS
6. BECOMING EASILY ANNOYED OR IRRITABLE: NOT AT ALL
1. FEELING NERVOUS, ANXIOUS, OR ON EDGE: NOT AT ALL
7. FEELING AFRAID AS IF SOMETHING AWFUL MIGHT HAPPEN: NOT AT ALL
IF YOU CHECKED OFF ANY PROBLEMS ON THIS QUESTIONNAIRE, HOW DIFFICULT HAVE THESE PROBLEMS MADE IT FOR YOU TO DO YOUR WORK, TAKE CARE OF THINGS AT HOME, OR GET ALONG WITH OTHER PEOPLE: NOT DIFFICULT AT ALL
2. NOT BEING ABLE TO STOP OR CONTROL WORRYING: SEVERAL DAYS

## 2018-08-17 ASSESSMENT — ENCOUNTER SYMPTOMS
ARTHRALGIAS: 1
NAUSEA: 0
ABDOMINAL PAIN: 0
FREQUENCY: 1
HEMATURIA: 0
WEAKNESS: 0
DIZZINESS: 0
MYALGIAS: 0
HEMATOCHEZIA: 0
DIARRHEA: 0
PARESTHESIAS: 0
JOINT SWELLING: 1
EYE PAIN: 0
FEVER: 0
SHORTNESS OF BREATH: 0
PALPITATIONS: 0
DYSURIA: 0
CHILLS: 0
BREAST MASS: 0
SORE THROAT: 0

## 2018-08-17 ASSESSMENT — ACTIVITIES OF DAILY LIVING (ADL)
CURRENT_FUNCTION: NO ASSISTANCE NEEDED
I_NEED_ASSISTANCE_FOR_THE_FOLLOWING_DAILY_ACTIVITIES:: NO ASSISTANCE IS NEEDED

## 2018-08-17 ASSESSMENT — PATIENT HEALTH QUESTIONNAIRE - PHQ9: 5. POOR APPETITE OR OVEREATING: NOT AT ALL

## 2018-08-17 NOTE — PATIENT INSTRUCTIONS
Blood work today  Tetanus shot today  I refilled your 2 medications for you  Call [facility] to schedule your skin check appointment    The [facility] pharmacy can give you the new Shingrex/shingles vaccine - you can stop in there for it    Understanding Seborrheic Keratosis  Seborrheic keratoses are wart-like growths on the skin. These growths are not cancer. Many people get them, especially after age 30.  How to say it  [id number]   What causes seborrheic keratoses?  Doctors do not know what causes seborrheic keratoses. They may run in families. In addition, they become more common as people get older.  What are the symptoms of seborrheic keratoses?  Here is what seborrheic keratoses often look like:    They tend to be round or oval in shape. They can be very small or about as big across as a quarter.    They can appear singly or in clusters.    They tend to be tan, brown, or black in color.    The edges may be scalloped or uneven-looking.    They can have a waxy or scaly look.    They can be a bit raised, appearing to be  stuck on  the skin.    They may become red and irritated if scratched or rubbed by clothing  Sebhorreic keratoses are not painful, but they may be itchy. They can become irritated if they are continually rubbed by skin or clothing. Seborrheic keratoses most often appear on the face, arms, chest, back, or belly.  How are seborrheic keratoses treated?  Seborrheic keratoses don t need to be treated unless they bother you. You may choose to have them removed because you find them unattractive. You may also want them removed because they get irritated and uncomfortable. A healthcare provider can remove them in an office visit. Ways that seborrheic keratoses can be removed include:    Freezing them off with liquid nitrogen    Cutting them off with a scalpel    Burning them off with electricity  What are the complications of seborrheic keratoses?  Seborrheic  keratoses are not cancer, but they can look like some types of skin cancer. So it s a good idea to ask your healthcare provider to check any new skin growths. Ask your healthcare provider about any skin problem that concerns you.  When should I call my healthcare provider?  Call your healthcare provider right away if any of these occur:    You develop a lot of seborrheic keratoses very quickly    You have a sore that does not heal within a few weeks, or heals and then comes back    You have a mole or skin growth that is changing in size, shape, or color    You have a mole or skin growth that looks different on one side from the other    You have a mole or skin growth that is not the same color throughout   Date Last Reviewed: 5/1/2016 2000-2017 [employer]. [address] [place] [address], [place] [address]. All rights reserved. This information is not intended as a substitute for professional medical care. Always follow your healthcare professional's instructions.        Prevention Guidelines, Women Ages 65 and Older  Screening tests and vaccines are an important part of managing your health. A screening test is done to find possible disorders or diseases in people who don't have any symptoms. The goal is to find a disease early so lifestyle changes can be made and you can be watched more closely to reduce the risk of disease, or to detect it early enough to treat it most effectively. Screening tests are not considered diagnostic, but are used to determine if more testing is needed. Health counseling is essential, too. Below are guidelines for these, for women ages 65 and older. Talk with your healthcare provider to make sure you re up to date on what you need.  Screening Who needs it How often   Type 2 diabetes or prediabetes All women ages 40 to 75 who are overweight or obese At least every 3 years   Type 2 diabetes All women with prediabetes Every year   Alcohol misuse All women in this age group At  routine exams   Blood pressure All women in this age group Yearly checkup if your blood pressure is normal*  Normal blood pressure is less than 120/80 mm Hg*  If your blood pressure reading is higher than normal, follow the advice of your healthcare provider   Breast cancer All women of average risk  There are no guidelines for breast cancer screening for 75 years and older.  Mammograms should be done every 1 or 2 years until age 75. At that point a woman should talk to her doctor about whether to continue screening. Talk to your doctor regarding your recommended frequency depending on your risk factors.   Cervical cancer Only women who had abnormal screening results before age 65 Talk with your healthcare provider   Chlamydia Women at increased risk for infection At routine exams   Colorectal cancer All women over the age of 50  This screening is advised against for women over 75 or if there is a life expectancy of less than 10 years.  Multiple tests are available and are used at different times. Possible tests include: flexible sigmoidoscopy, colonoscopy, double-contrast barium enema, yearly fecal occult blood test, fecal immunochemical test, or stool DNA test as often as your healthcare provider advises. Talk with your healthcare provider about which tests are best for you.   Depression All women in this age group At routine exams   Gonorrhea Sexually active women at increased risk for infection At routine exams   Hepatitis C Anyone at increased risk; 1 time for those born between 1945 and 1965 At routine exams   High cholesterol or triglycerides All women in this age group who are at risk for coronary artery disease At least every 5 years   HIV Women at increased risk for infection-talk with your healthcare provider At routine exams   Lung cancer Adults ages 55 to 74 who have smoked with a 30-pack-a-year history and have smoked within the past 15 years  Annual low dose CT scan   Obesity All women in this age  group At routine exams   Osteoporosis All women in this age group Bone density test at age 65, then follow-up as advised by your healthcare provider   Syphilis Women at increased risk for infection-talk with your healthcare provider At routine exams   Thyroid-Stimulating Hormone (TSH) All women in this age group with symptoms of thyroid dysfunction. There is not enough evidence to support TSH screening in women without symptoms.  ACOG recommendation is every 5 years; American Academy of Family Physicians concludes there is not enough evidence to support routine screening in adults without symptoms.    Tuberculosis Women at increased risk for infection-talk with your healthcare provider Ask your healthcare provider   Vision All women in this age group Every 1 to 2 years; if you have a chronic health condition, ask your healthcare provider if you need exams more often   Vaccine Who needs it How often   Chickenpox (varicella) All women in this age group who have no record of this infection or vaccine 2 doses; second dose should be given at least 4 weeks after the first dose   Hepatitis A Women at increased risk for infection-talk with your healthcare provider 2 doses given 6 months apart   Hepatitis B Women at increased risk for infection-talk with your healthcare provider 3 doses over 6 months; second dose should be given 1 month after the first dose; the third dose should be given at least 2 months after the second dose and at least 4 months after the first dose   Haemophilus influenza Type B (HIB) Women at increased risk for infection-talk with your healthcare provider 1 to 3 doses   Influenza (flu) All women in this age group Once a year   Pneumococcal conjugate vaccine (PCV13) and pneumococcal polysaccharide vaccine (PPSV23) All women in this age group 1 dose of each vaccine   Tetanus/diphtheria/pertussis (Td/Tdap) booster All women in this age group Td every 10 years, or a one-time dose of Tdap instead of a Td  booster after age 18, then Td every 10 years   Zoster All women in this age group 1 dose   Counseling Who needs it How often   Diet and exercise Women who are overweight or obese When diagnosed, and then at routine exams   Fall prevention (exercise and vitamin D supplements) All women in this age group At routine exams   Sexually transmitted infection prevention Women at increased risk for infection-talk with your healthcare provider At routine exams   Use of daily aspirin Talk to your healthcare provider about whether or not to start taking low-dose aspirin for the prevention of cardiovascular disease (CVD) and colorectal cancer in adults ages 60 to 69 who have at least a 10% risk of getting CVD within the next 10 years.  People who are not at increased risk for bleeding, have a life expectancy of at least 10 years, and are willing to take low-dose aspirin daily for at least 10 years are more likely to benefit. When the advantages of taking low-dose aspirin outweigh the risks, people may choose to start taking a low-dose aspirin.  There is not enough data to support the use of aspirin in people over the age of 70.  When your risk is known   Use of tobacco and the health effects it can cause All women in this age group Every exam   Date Last Reviewed: 11/1/2017 2000-2017 [employer]. [address] [place] [address]. All rights reserved. This information is not intended as a substitute for professional medical care. Always follow your healthcare professional's instructions.    [facility]   Discharged by : Roxy MONTALVO MA    If you have any questions regarding your visit please contact your care team:     Team Gold                [facility] Hours Telephone Number     DEEPAK Viera Dr., Dr., CNP 7am-7pm  Monday - Thursday   7am-5pm  Fridays  [phone]   (Appointment scheduling available 24/7)     RN  Line  [phone] option 2     Urgent Care - [place] - 11am-9pm Monday-Friday Saturday-Sunday- 9am-5pm     [place] -   5pm-9pm Monday-Friday Saturday-Sunday- 9am-5pm    [phone] - [place]omena Harmon    [phone] - [place]       For a Price Quote for your services, please call our Consumer Price Line at [phone].     What options do I have for visits at the clinic other than the traditional office visit?     To expand how we care for you, many of our providers are utilizing electronic visits (e-visits) and telephone visits, when medically appropriate, for interactions with their patients rather than a visit in the clinic. We also offer nurse visits for many medical concerns. Just like any other service, we will bill your insurance company for this type of visit based on time spent on the phone with your provider. Not all insurance companies cover these visits. Please check with your medical insurance if this type of visit is covered. You will be responsible for any charges that are not paid by your insurance.   E-visits via ""t: generally incur a $35.00 fee.     Telephone visits:  Time spent on the phone: *charged based on time that is spent on the phone in increments of 10 minutes. Estimated cost:   5-10 mins $30.00   11-20 mins. $59.00   21-30 mins. $85.00       Use [employer] (secure email communication and access to your chart) to send your primary care provider a message or make an appointment. Ask someone on your Team how to sign up for ""t.     As always, Thank you for trusting us with your health care needs!      [facility]:    Scheduling Appointments  [place]  Call: [phone]    [facility]  Call: [phone]    [facility]  Call: [phone]    For Gastroenterology referrals   [facility]   Clinics and Surgery Center, 4th Floor   [address] [place] [address]   Appointments: [phone]    WHERE TO GO FOR CARE?  Clinic    Make an appointment if you:       Are sick (cold, cough, flu, sore throat, earache or in pain).       Have a small injury (sprain, small cut, burn or broken bone).       Need a physical exam, Pap smear, vaccine or prescription refill.       Have questions about your health or medicines.    To reach us:      Call [phone] ([phone]). Open 24 hours every day. (For counseling services, call [phone].)    Log into [employer] at [facility]. (Visit [employer] to create an account.) Hospital emergency room    An emergency is a serious or life- threatening problem that must be treated right away.    Call 551 or get to the hospital if you have:      Very bad or sudden:            - Chest pain or pressure         - Bleeding         - Head or belly pain         - Dizziness or trouble seeing, walking or                          Speaking      Problems breathing      Blood in your vomit or you are coughing up blood      A major injury (knocked out, loss of a finger or limb, rape, broken bone protruding from skin)    A mental health crisis. (Or call the Mental Health Crisis line at 1-712.909.6153 or Suicide Prevention Hotline at 1-512.689.1074.)    Open 24 hours every day. You don't need an appointment.     Urgent care    Visit urgent care for sickness or small injuries when the clinic is closed. You don't need an appointment. To check hours or find an urgent care near you, visit [email or web address]. Online care    Get online care from [facility] for more than 70 common problems, like colds, allergies and infections. Open 24 hours every day at:   [email or web address]   Need help deciding?    For advice about where to be seen, you may call your clinic and ask to speak with a nurse. We're here for you 24 hours every day.         If you are deaf or hard of hearing, please let us know. We provide many free services  including sign language interpreters, oral interpreters, TTYs, telephone amplifiers, note takers and written materials.

## 2018-08-17 NOTE — PROGRESS NOTES
"SUBJECTIVE:   Laurie Orosco is a 77 year old female who presents for Preventive Visit.  Are you in the first 12 months of your Medicare coverage?  No    Care Team Specialists:  Dr. Joya at [facility] for Parkinson and headaches.  Psychiatry at [facility] every 3 months for depression.    Balance is getting worse.  Trouble going down stairs.  Trouble getting in the car and her family helps her.  She did have a fall when trying to go up a curb.  She declines physical therapy referral.    Depression:  On Amitriptyline and Fluoxetine.  Also taking Risperdal at bedtime for \"voices.\"  Says she will hear voices that yell.  Son recently diagnosed with cirrhosis.   has dementia.  Recent stressors regarding these changes in her family.      Ischemia Colitis:  Episodes of abdominal pain diarrhea.  No bloody stool.  No known triggers.  Last colonoscopy 3/29/2012 showing colitis.    Physical   Annual:     Getting at least 3 servings of Calcium per day:  NO    Bi-annual eye exam:  Yes    Dental care twice a year:  Yes    Sleep apnea or symptoms of sleep apnea:  None    Diet:  Regular (no restrictions)    Additional concerns today:  No    Ability to successfully perform activities of daily living: no assistance needed    Home Safety:  No safety concerns identified    Hearing Impairment: difficulty following a conversation in a noisy restaurant or crowded room, difficult to understand a speaker at a public meeting or Spiritism service and need to ask people to speak up or repeat themselves      Fall risk:  Fallen 2 or more times in the past year?: Yes  Any fall with injury in the past year?: No    COGNITIVE SCREEN  1) Repeat 3 items (Leader, Season, Table)   2) Clock draw: ABNORMAL  3) 3 item recall: Recalls 3 objects  Results: 3 items recalled: COGNITIVE IMPAIRMENT LESS LIKELY    Mini-CogTM Copyright S Jacky. Licensed by the author for use in [facility]; reprinted with permission " (ruddy@[facility]). All rights reserved.        Reviewed and updated as needed this visit by clinical staff  Tobacco  Allergies  Meds  Problems  Med Hx  Surg Hx  Fam Hx  Soc Hx          Reviewed and updated as needed this visit by Provider  Allergies  Meds  Problems        Social History   Substance Use Topics     Smoking status: Former Smoker     Types: Cigarettes     Quit date: 3/30/1960     Smokeless tobacco: Never Used      Comment: Smoked cigarettes for one year when she was 22 years old.     Alcohol use Yes      Comment: 1 GLASS OF WINE EVERY 1-2 WEEKS       Alcohol Use 8/17/2018   If you drink alcohol do you typically have greater than 3 drinks per day OR greater than 7 drinks per week? No   No flowsheet data found.        Concern - Mole  Onset: For a long time    Description:   Right shoulder    Progression of Symptoms:  same  Therapies Tried and outcome: None    Today's PHQ-2 Score:   PHQ-2 ( 1999 Pfizer) 8/17/2018   Q1: Little interest or pleasure in doing things 0   Q2: Feeling down, depressed or hopeless 0   PHQ-2 Score 0   Q1: Little interest or pleasure in doing things Not at all   Q2: Feeling down, depressed or hopeless Not at all   PHQ-2 Score 0       Do you feel safe in your environment - Yes    Do you have a Health Care Directive?: Yes: Advance Directive has been received and scanned.    Current providers sharing in care for this patient include:   No care team member to display    The following health maintenance items are reviewed in Epic and correct as of today:  Health Maintenance   Topic Date Due     DEPRESSION ACTION PLAN Q1 YR  02/13/2013     INFLUENZA VACCINE (1) 09/01/2018     PHQ-9 Q6 MONTHS  02/17/2019     CMP Q1 YR  08/17/2019     FALL RISK ASSESSMENT  08/17/2019     LIPID MONITORING Q1 YEAR  08/17/2019     CBC Q1 YR  08/17/2019     ADVANCE DIRECTIVE PLANNING Q5 YRS  07/19/2021     COLON CANCER SCREEN (SYSTEM ASSIGNED)  03/29/2022     TETANUS IMMUNIZATION (SYSTEM ASSIGNED)   08/17/2028     MIGRAINE ACTION PLAN  Completed     DEXA SCAN SCREENING (SYSTEM ASSIGNED)  Completed     PNEUMOCOCCAL  Completed     BP Readings from Last 3 Encounters:   08/17/18 108/70   11/09/17 129/89   07/21/17 138/90    Wt Readings from Last 3 Encounters:   08/17/18 147 lb 12.8 oz (67 kg)   07/21/17 156 lb (70.8 kg)   11/11/16 160 lb (72.6 kg)                  Patient Active Problem List   Diagnosis     Migraine     Depression, major     Left knee DJD     Vaginal prolapse     OA (Osteoarthritis) of Knee, left     Breast microcalcifications     CARDIOVASCULAR SCREENING; LDL GOAL LESS THAN 160     Advance Care Planning     Acute venous embolism and thrombosis of deep vessels of distal lower extremity (H)     Pulmonary embolism and infarction (H)     Liver hemangioma     GERD (gastroesophageal reflux disease)     Health Care Home     Ovarian cyst     Ischemic colitis (H)     LVH (left ventricular hypertrophy)     Hypertension goal BP (blood pressure) < 140/90     Parkinson's disease (H)     Urgency incontinence     TMJ (temporomandibular joint syndrome)     Myofascial muscle pain     Seborrheic keratoses     Past Surgical History:   Procedure Laterality Date     HEMORRHOIDECTOMY       HYSTERECTOMY, PAP NO LONGER INDICATED       ORTHOPEDIC SURGERY      knee replacement 10/11     SURGICAL HISTORY OF -       vaginal repair, Dr. Mcmillan       Social History   Substance Use Topics     Smoking status: Former Smoker     Types: Cigarettes     Quit date: 3/30/1960     Smokeless tobacco: Never Used      Comment: Smoked cigarettes for one year when she was 22 years old.     Alcohol use Yes      Comment: 1 GLASS OF WINE EVERY 1-2 WEEKS     Family History   Problem Relation Age of Onset     Cancer Father      LUNG- SMOKER     Cancer Brother      kidney     Diabetes No family hx of      Hypertension No family hx of          Current Outpatient Prescriptions   Medication Sig Dispense Refill     AMITRIPTYLINE HCL 50 MG OR TABS 3  TABLETS AT BEDTIME       ASPIRIN PO        butalbital-aspirin-caffeine (FIORINAL) capsule Take 1 capsule by mouth every 4 hours as needed for pain. 30 capsule 0     CALCIUM + D OR 1 CAPSULE DAILY       carbidopa-levodopa (SINEMET)  MG per tablet Take 1 tablet by mouth 3 times daily       FIBER COMPLETE TABS Take 1 tablet by mouth daily       FLUoxetine (PROZAC) 20 MG capsule Take 1 capsule (20 mg) by mouth daily Prescribed by psychiatrist 1 capsule 0     MELATONIN PO        oxybutynin (DITROPAN-XL) 10 MG 24 hr tablet TAKE 1 TABLET (10 MG) BY MOUTH DAILY 90 tablet 3     propranolol (INDERAL) 10 MG tablet Take 1 tablet (10 mg) by mouth 2 times daily Prescribed by Neurologist       ranitidine (ZANTAC) 300 MG tablet TAKE 1 TABLET (300 MG) BY MOUTH AT BEDTIME 90 tablet 3     RISPERDAL 2 MG OR TABS 1 TABLET AT BEDTIME       UNABLE TO FIND MEDICATION NAME: [employer] stool softner oral       Allergies   Allergen Reactions     Baclofen      Lisinopril Cough     Morphine Sulfate Rash     Valium Rash       Review of Systems   Constitutional: Negative for chills and fever.   HENT: Negative for congestion, ear pain, hearing loss and sore throat.    Eyes: Negative for pain and visual disturbance.   Respiratory: Negative for shortness of breath.    Cardiovascular: Negative for chest pain and palpitations.   Gastrointestinal: Negative for abdominal pain, diarrhea, hematochezia and nausea.   Breasts:  Negative for tenderness, breast mass and discharge.   Genitourinary: Positive for frequency. Negative for dysuria, genital sores, hematuria, pelvic pain, urgency, vaginal bleeding and vaginal discharge.   Musculoskeletal: Positive for arthralgias and joint swelling. Negative for myalgias.   Skin: Negative for rash.   Neurological: Negative for dizziness, weakness and paresthesias.   Psychiatric/Behavioral: Negative for mood changes.       OBJECTIVE:   /70 (BP Location: Right arm, Patient Position: Chair, Cuff Size: Adult  "Large)  Pulse 80  Temp 98.7  F (37.1  C) (Oral)  Ht 5' 3.5\" (1.613 m)  Wt 147 lb 12.8 oz (67 kg)  BMI 25.77 kg/m2 Estimated body mass index is 25.77 kg/(m^2) as calculated from the following:    Height as of this encounter: 5' 3.5\" (1.613 m).    Weight as of this encounter: 147 lb 12.8 oz (67 kg).  Physical Exam  GENERAL: healthy, alert and no distress  EYES: Eyes grossly normal to inspection, PERRL and conjunctivae and sclerae normal  HENT: ear canals and TM's normal, nose and mouth without ulcers or lesions  NECK: no adenopathy, no asymmetry, masses, or scars and thyroid normal to palpation  RESP: lungs clear to auscultation - no rales, rhonchi or wheezes  BREAST: normal without masses, tenderness or nipple discharge and no palpable axillary masses or adenopathy  CV: regular rate and rhythm, normal S1 S2, no S3 or S4, no murmur, click or rub, no peripheral edema and peripheral pulses strong  ABDOMEN: soft, nontender, no hepatosplenomegaly, no masses and bowel sounds normal  MS: mast-like facial expression, hypomania  SKIN: Multiple seborrheic keratoses  NEURO: Normal strength and tone, mentation intact and speech normal  PSYCH: mentation appears normal, affect normal/bright, judgement and insight intact and appearance well groomed  Feet:  pedal pulses 2+ bilaterally.  Thickened, dystrophic toenails.    Diagnostic Test Results:  Results for orders placed or performed in visit on 08/17/18   Lipid panel reflex to direct LDL Fasting   Result Value Ref Range    Cholesterol 163 <200 mg/dL    Triglycerides 88 <150 mg/dL    HDL Cholesterol 58 >49 mg/dL    LDL Cholesterol Calculated 87 <100 mg/dL    Non HDL Cholesterol 105 <130 mg/dL   CBC with platelets and differential   Result Value Ref Range    WBC 7.6 4.0 - 11.0 10e9/L    RBC Count 4.00 3.8 - 5.2 10e12/L    Hemoglobin 12.9 11.7 - 15.7 g/dL    Hematocrit 38.4 35.0 - 47.0 %    MCV 96 78 - 100 fl    MCH 32.3 26.5 - 33.0 pg    MCHC 33.6 31.5 - 36.5 g/dL    RDW 13.6 " 10.0 - 15.0 %    Platelet Count 204 150 - 450 10e9/L    Diff Method Automated Method     % Neutrophils 63.7 %    % Lymphocytes 23.7 %    % Monocytes 9.8 %    % Eosinophils 2.5 %    % Basophils 0.3 %    Absolute Neutrophil 4.8 1.6 - 8.3 10e9/L    Absolute Lymphocytes 1.8 0.8 - 5.3 10e9/L    Absolute Monocytes 0.7 0.0 - 1.3 10e9/L    Absolute Eosinophils 0.2 0.0 - 0.7 10e9/L    Absolute Basophils 0.0 0.0 - 0.2 10e9/L   Comprehensive metabolic panel (BMP + Alb, Alk Phos, ALT, AST, Total. Bili, TP)   Result Value Ref Range    Sodium 142 133 - 144 mmol/L    Potassium 4.3 3.4 - 5.3 mmol/L    Chloride 106 94 - 109 mmol/L    Carbon Dioxide 28 20 - 32 mmol/L    Anion Gap 8 3 - 14 mmol/L    Glucose 102 (H) 70 - 99 mg/dL    Urea Nitrogen 22 7 - 30 mg/dL    Creatinine 0.72 0.52 - 1.04 mg/dL    GFR Estimate 78 >60 mL/min/1.7m2    GFR Estimate If Black >90 >60 mL/min/1.7m2    Calcium 8.3 (L) 8.5 - 10.1 mg/dL    Bilirubin Total 0.2 0.2 - 1.3 mg/dL    Albumin 3.5 3.4 - 5.0 g/dL    Protein Total 6.5 (L) 6.8 - 8.8 g/dL    Alkaline Phosphatase 98 40 - 150 U/L    ALT 13 0 - 50 U/L    AST 9 0 - 45 U/L       ASSESSMENT / PLAN:   1. Routine history and physical examination of adult    - CBC with platelets and differential  - Comprehensive metabolic panel (BMP + Alb, Alk Phos, ALT, AST, Total. Bili, TP)  - Patient declined Physical Therapy referral to improve strength and balance.    2. Need for prophylactic vaccination with tetanus-diphtheria (TD)    - TDAP, IM (10 - 64 YRS) - Adacel    3. Ischemic colitis (H)  Chronic, stable.    4. Parkinson's disease (H)  Chronic, stable, continue following with Neurology.    5. Onychomycosis  - Patient declined treatment.    6. Screening for hyperlipidemia    - Lipid panel reflex to direct LDL Fasting    7. Urgency incontinence  Chronic, stable, continue current treatment.    - oxybutynin (DITROPAN-XL) 10 MG 24 hr tablet; TAKE 1 TABLET (10 MG) BY MOUTH DAILY  Dispense: 90 tablet; Refill: 3    8.  "Gastroesophageal reflux disease without esophagitis  Chronic, stable, continue current treatment.    - ranitidine (ZANTAC) 300 MG tablet; TAKE 1 TABLET (300 MG) BY MOUTH AT BEDTIME  Dispense: 90 tablet; Refill: 3    9. Seborrheic keratoses    - DERMATOLOGY REFERRAL given multiple lesions    10. Overactive bladder  Chronic, stable, continue current treatment.    - oxybutynin (DITROPAN-XL) 10 MG 24 hr tablet; TAKE 1 TABLET (10 MG) BY MOUTH DAILY  Dispense: 90 tablet; Refill: 3    11. Migraine without status migrainosus, not intractable, unspecified migraine type  Chronic, continue following with Neurology.    - propranolol (INDERAL) 10 MG tablet; Take 1 tablet (10 mg) by mouth 2 times daily Prescribed by Neurologist    12. Acute venous embolism and thrombosis of deep vessels of distal end of left lower extremity (H)  In the past, nowolved.    13. Pulmonary embolism and infarction (H)  In the past, now resolved.    14. Moderate single current episode of major depressive disorder (H)  Chronic, stable, continue following with Psychiatry.      End of Life Planning:  Patient currently has an advanced directive: Yes.  Practitioner is supportive of decision.    COUNSELING:  Reviewed preventive health counseling, as reflected in patient instructions       Regular exercise       Healthy diet/nutrition       Vision screening       Hearing screening       Dental care    BP Readings from Last 1 Encounters:   08/17/18 108/70     Estimated body mass index is 25.77 kg/(m^2) as calculated from the following:    Height as of this encounter: 5' 3.5\" (1.613 m).    Weight as of this encounter: 147 lb 12.8 oz (67 kg).           reports that she quit smoking about 58 years ago. Her smoking use included Cigarettes. She has never used smokeless tobacco.      Appropriate preventive services were discussed with this patient, including applicable screening as appropriate for cardiovascular disease, diabetes, osteopenia/osteoporosis, and " glaucoma.  As appropriate for age/gender, discussed screening for colorectal cancer, prostate cancer, breast cancer, and cervical cancer. Checklist reviewing preventive services available has been given to the patient.    Reviewed patients plan of care and provided an AVS. The Basic Care Plan (routine screening as documented in Health Maintenance) for Laurie meets the Care Plan requirement. This Care Plan has been established and reviewed with the Patient.    Counseling Resources:  ATP IV Guidelines  Pooled Cohorts Equation Calculator  Breast Cancer Risk Calculator  FRAX Risk Assessment  ICSI Preventive Guidelines  Dietary Guidelines for Americans, 2010  USDA's MyPlate  ASA Prophylaxis  Lung CA Screening    Sunita Brito NP  [facility]

## 2018-08-17 NOTE — NURSING NOTE
Prior to injection verified patient identity using patient's name and date of birth.  Due to injection administration, patient instructed to remain in clinic for 15 minutes  afterwards, and to report any adverse reaction to me immediately.      Screening Questionnaire for Adult Immunization    Are you sick today?   No   Do you have allergies to medications, food, a vaccine component or latex?   Yes   Have you ever had a serious reaction after receiving a vaccination?   No   Do you have a long-term health problem with heart disease, lung disease, asthma, kidney disease, metabolic disease (e.g. diabetes), anemia, or other blood disorder?   No   Do you have cancer, leukemia, HIV/AIDS, or any other immune system problem?   No   In the past 3 months, have you taken medications that affect  your immune system, such as prednisone, other steroids, or anticancer drugs; drugs for the treatment of rheumatoid arthritis, Crohn s disease, or psoriasis; or have you had radiation treatments?   No   Have you had a seizure, or a brain or other nervous system problem?   No   During the past year, have you received a transfusion of blood or blood     products, or been given immune (gamma) globulin or antiviral drug?   No   For women: Are you pregnant or is there a chance you could become        pregnant during the next month?   No   Have you received any vaccinations in the past 4 weeks?   No     Immunization questionnaire was positive for at least one answer.  Notified Sunita Brito.        Per orders of Sunita Brito CNP, injection of Adacel given by Roxy Suarez. Patient instructed to remain in clinic for 15 minutes afterwards, and to report any adverse reaction to me immediately.       Screening performed by Roxy Suarez on 8/17/2018 at 2:29 PM.     Epidermal Autograft Text: The defect edges were debeveled with a #15 scalpel blade.  Given the location of the defect, shape of the defect and the proximity to free margins an epidermal autograft was deemed most appropriate.  Using a sterile surgical marker, the primary defect shape was transferred to the donor site. The epidermal graft was then harvested.  The skin graft was then placed in the primary defect and oriented appropriately.

## 2018-08-17 NOTE — MR AVS SNAPSHOT
After Visit Summary   8/17/2018    Laurie Orosco    MRN: [id number]           Patient Information     Date Of Birth          1940        Visit Information        Provider Department      8/17/2018 1:20 PM Sunita Brito NP [facility]        Today's Diagnoses     Routine history and physical examination of adult    -  1    Need for prophylactic vaccination with tetanus-diphtheria (TD)        Acute venous embolism and thrombosis of deep vessels of distal end of left lower extremity (H)        Ischemic colitis (H)        Parkinson's disease (H)        Onychomycosis        Screening for hyperlipidemia        Urgency incontinence        Gastroesophageal reflux disease without esophagitis        Seborrheic keratoses          Care Instructions    Blood work today  Tetanus shot today  I refilled your 2 medications for you  Call [facility] to schedule your skin check appointment    The [facility] pharmacy can give you the new Shingrex/shingles vaccine - you can stop in there for it    Understanding Seborrheic Keratosis  Seborrheic keratoses are wart-like growths on the skin. These growths are not cancer. Many people get them, especially after age 30.  How to say it  [id number]   What causes seborrheic keratoses?  Doctors do not know what causes seborrheic keratoses. They may run in families. In addition, they become more common as people get older.  What are the symptoms of seborrheic keratoses?  Here is what seborrheic keratoses often look like:    They tend to be round or oval in shape. They can be very small or about as big across as a quarter.    They can appear singly or in clusters.    They tend to be tan, brown, or black in color.    The edges may be scalloped or uneven-looking.    They can have a waxy or scaly look.    They can be a bit raised, appearing to be  stuck on  the skin.    They may become red and irritated if scratched or  rubbed by clothing  Sebhorreic keratoses are not painful, but they may be itchy. They can become irritated if they are continually rubbed by skin or clothing. Seborrheic keratoses most often appear on the face, arms, chest, back, or belly.  How are seborrheic keratoses treated?  Seborrheic keratoses don t need to be treated unless they bother you. You may choose to have them removed because you find them unattractive. You may also want them removed because they get irritated and uncomfortable. A healthcare provider can remove them in an office visit. Ways that seborrheic keratoses can be removed include:    Freezing them off with liquid nitrogen    Cutting them off with a scalpel    Burning them off with electricity  What are the complications of seborrheic keratoses?  Seborrheic keratoses are not cancer, but they can look like some types of skin cancer. So it s a good idea to ask your healthcare provider to check any new skin growths. Ask your healthcare provider about any skin problem that concerns you.  When should I call my healthcare provider?  Call your healthcare provider right away if any of these occur:    You develop a lot of seborrheic keratoses very quickly    You have a sore that does not heal within a few weeks, or heals and then comes back    You have a mole or skin growth that is changing in size, shape, or color    You have a mole or skin growth that looks different on one side from the other    You have a mole or skin growth that is not the same color throughout   Date Last Reviewed: 5/1/2016 2000-2017 [employer]. [address] [place] [address], [place] [address]. All rights reserved. This information is not intended as a substitute for professional medical care. Always follow your healthcare professional's instructions.        Prevention Guidelines, Women Ages 65 and Older  Screening tests and vaccines are an important part of managing your health. A screening test is done to find  possible disorders or diseases in people who don't have any symptoms. The goal is to find a disease early so lifestyle changes can be made and you can be watched more closely to reduce the risk of disease, or to detect it early enough to treat it most effectively. Screening tests are not considered diagnostic, but are used to determine if more testing is needed. Health counseling is essential, too. Below are guidelines for these, for women ages 65 and older. Talk with your healthcare provider to make sure you re up to date on what you need.  Screening Who needs it How often   Type 2 diabetes or prediabetes All women ages 40 to 75 who are overweight or obese At least every 3 years   Type 2 diabetes All women with prediabetes Every year   Alcohol misuse All women in this age group At routine exams   Blood pressure All women in this age group Yearly checkup if your blood pressure is normal*  Normal blood pressure is less than 120/80 mm Hg*  If your blood pressure reading is higher than normal, follow the advice of your healthcare provider   Breast cancer All women of average risk  There are no guidelines for breast cancer screening for 75 years and older.  Mammograms should be done every 1 or 2 years until age 75. At that point a woman should talk to her doctor about whether to continue screening. Talk to your doctor regarding your recommended frequency depending on your risk factors.   Cervical cancer Only women who had abnormal screening results before age 65 Talk with your healthcare provider   Chlamydia Women at increased risk for infection At routine exams   Colorectal cancer All women over the age of 50  This screening is advised against for women over 75 or if there is a life expectancy of less than 10 years.  Multiple tests are available and are used at different times. Possible tests include: flexible sigmoidoscopy, colonoscopy, double-contrast barium enema, yearly fecal occult blood test, fecal immunochemical  test, or stool DNA test as often as your healthcare provider advises. Talk with your healthcare provider about which tests are best for you.   Depression All women in this age group At routine exams   Gonorrhea Sexually active women at increased risk for infection At routine exams   Hepatitis C Anyone at increased risk; 1 time for those born between 1945 and 1965 At routine exams   High cholesterol or triglycerides All women in this age group who are at risk for coronary artery disease At least every 5 years   HIV Women at increased risk for infection-talk with your healthcare provider At routine exams   Lung cancer Adults ages 55 to 74 who have smoked with a 30-pack-a-year history and have smoked within the past 15 years  Annual low dose CT scan   Obesity All women in this age group At routine exams   Osteoporosis All women in this age group Bone density test at age 65, then follow-up as advised by your healthcare provider   Syphilis Women at increased risk for infection-talk with your healthcare provider At routine exams   Thyroid-Stimulating Hormone (TSH) All women in this age group with symptoms of thyroid dysfunction. There is not enough evidence to support TSH screening in women without symptoms.  ACOG recommendation is every 5 years; American Academy of Family Physicians concludes there is not enough evidence to support routine screening in adults without symptoms.    Tuberculosis Women at increased risk for infection-talk with your healthcare provider Ask your healthcare provider   Vision All women in this age group Every 1 to 2 years; if you have a chronic health condition, ask your healthcare provider if you need exams more often   Vaccine Who needs it How often   Chickenpox (varicella) All women in this age group who have no record of this infection or vaccine 2 doses; second dose should be given at least 4 weeks after the first dose   Hepatitis A Women at increased risk for infection-talk with your  healthcare provider 2 doses given 6 months apart   Hepatitis B Women at increased risk for infection-talk with your healthcare provider 3 doses over 6 months; second dose should be given 1 month after the first dose; the third dose should be given at least 2 months after the second dose and at least 4 months after the first dose   Haemophilus influenza Type B (HIB) Women at increased risk for infection-talk with your healthcare provider 1 to 3 doses   Influenza (flu) All women in this age group Once a year   Pneumococcal conjugate vaccine (PCV13) and pneumococcal polysaccharide vaccine (PPSV23) All women in this age group 1 dose of each vaccine   Tetanus/diphtheria/pertussis (Td/Tdap) booster All women in this age group Td every 10 years, or a one-time dose of Tdap instead of a Td booster after age 18, then Td every 10 years   Zoster All women in this age group 1 dose   Counseling Who needs it How often   Diet and exercise Women who are overweight or obese When diagnosed, and then at routine exams   Fall prevention (exercise and vitamin D supplements) All women in this age group At routine exams   Sexually transmitted infection prevention Women at increased risk for infection-talk with your healthcare provider At routine exams   Use of daily aspirin Talk to your healthcare provider about whether or not to start taking low-dose aspirin for the prevention of cardiovascular disease (CVD) and colorectal cancer in adults ages 60 to 69 who have at least a 10% risk of getting CVD within the next 10 years.  People who are not at increased risk for bleeding, have a life expectancy of at least 10 years, and are willing to take low-dose aspirin daily for at least 10 years are more likely to benefit. When the advantages of taking low-dose aspirin outweigh the risks, people may choose to start taking a low-dose aspirin.  There is not enough data to support the use of aspirin in people over the age of 70.  When your risk is known    Use of tobacco and the health effects it can cause All women in this age group Every exam   Date Last Reviewed: 11/1/2017 2000-2017 [employer]. [address] [place] [address], [place] [address]. All rights reserved. This information is not intended as a substitute for professional medical care. Always follow your healthcare professional's instructions.                Follow-ups after your visit        Additional Services     DERMATOLOGY REFERRAL       Your provider has referred you to: [facility]: [facility]  [place] [phone]   [email or web address]    Please be aware that coverage of these services is subject to the terms and limitations of your health insurance plan.  Call member services at your health plan with any benefit or coverage questions.      Please bring the following with you to your appointment:    (1) Any X-Rays, CTs or MRIs which have been performed.  Contact the facility where they were done to arrange for  prior to your scheduled appointment.    (2) List of current medications  (3) This referral request   (4) Any documents/labs given to you for this referral                  Who to contact     If you have questions or need follow up information about today's clinic visit or your schedule please contact [facility] directly at [phone].  Normal or non-critical lab and imaging results will be communicated to you by MyChart, letter or phone within 4 business days after the clinic has received the results. If you do not hear from us within 7 days, please contact the clinic through MyChart or phone. If you have a critical or abnormal lab result, we will notify you by phone as soon as possible.  Submit refill requests through Service Route or call your pharmacy and they will forward the refill request to us. Please allow 3 business days for your refill to be completed.          Additional Information About Your Visit        Care EveryWhere ID   "   This is your Care EveryWhere ID. This could be used by other organizations to access your [facility] medical records  [id number]        Your Vitals Were     Pulse Temperature Height BMI (Body Mass Index)          80 98.7  F (37.1  C) (Oral) 5' 3.5\" (1.613 m) 25.77 kg/m2         Blood Pressure from Last 3 Encounters:   08/17/18 108/70   11/09/17 129/89   07/21/17 138/90    Weight from Last 3 Encounters:   08/17/18 147 lb 12.8 oz (67 kg)   07/21/17 156 lb (70.8 kg)   11/11/16 160 lb (72.6 kg)              We Performed the Following     CBC with platelets and differential     Comprehensive metabolic panel (BMP + Alb, Alk Phos, ALT, AST, Total. Bili, TP)     DERMATOLOGY REFERRAL     Lipid panel reflex to direct LDL Fasting     TDAP, IM (10 - 64 YRS) - Adacel          Where to get your medicines      These medications were sent to [facility][id number] - [place], [address] [place] [address], [facility] [address]     Phone:  [phone]     oxybutynin 10 MG 24 hr tablet    ranitidine 300 MG tablet          Primary Care Provider    None Specified       No primary provider on file.        Equal Access to Services     CARLOS ALBERTO CHAN AH: Hadii zion whitto Sochrisali, waaxda luqadaha, qaybta kaalmada adeegyada, akash preciado. So [facility] [phone].    ATENCIÓN: Si habla español, tiene a jim disposición servicios gratuitos de asistencia lingüística. Llame al [phone].    We comply with applicable federal civil rights laws and [place] laws. We do not discriminate on the basis of race, color, national origin, age, disability, sex, sexual orientation, or gender identity.            Thank you!     Thank you for choosing [facility]  for your care. Our goal is always to provide you with excellent care. Hearing back from our patients is one way we can continue to improve our services. Please take a few minutes to complete the written " survey that you may receive in the mail after your visit with us. Thank you!             Your Updated Medication List - Protect others around you: Learn how to safely use, store and throw away your medicines at www.disposemymeds.org.          This list is accurate as of 8/17/18  2:23 PM.  Always use your most recent med list.                   Brand Name Dispense Instructions for use Diagnosis    amitriptyline 50 MG tablet    ELAVIL     3 TABLETS AT BEDTIME        ASPIRIN PO           butalbital-aspirin-caffeine capsule    FIORINAL    30 capsule    Take 1 capsule by mouth every 4 hours as needed for pain.    Migraine       CALCIUM + D PO      1 CAPSULE DAILY        carbidopa-levodopa  MG per tablet    SINEMET     Take 1 tablet by mouth 3 times daily        FLUoxetine 20 MG capsule    PROzac    1 capsule    Take 1 capsule (20 mg) by mouth daily Prescribed by psychiatrist    Moderate single current episode of major depressive disorder (H)       MELATONIN PO           oxybutynin 10 MG 24 hr tablet    DITROPAN-XL    90 tablet    TAKE 1 TABLET (10 MG) BY MOUTH DAILY    Urgency incontinence       ranitidine 300 MG tablet    ZANTAC    90 tablet    TAKE 1 TABLET (300 MG) BY MOUTH AT BEDTIME    Gastroesophageal reflux disease without esophagitis       RISPERDAL 2 MG tablet   Generic drug:  risperiDONE      1 TABLET AT BEDTIME

## 2018-08-17 NOTE — LETTER
[facility]   [address] [place] [address]                                     August 20, 2018    Laurie Orosco  [address]  [address]  [facility] [place] [facility] [address]        Dear Laurie,  Your kidney function, liver function, and electrolytes are within normal limits.   Your cholesterol levels are within normal limits.   Your blood counts are within normal limits.     Results for orders placed or performed in visit on 08/17/18   Lipid panel reflex to direct LDL Fasting   Result Value Ref Range    Cholesterol 163 <200 mg/dL    Triglycerides 88 <150 mg/dL    HDL Cholesterol 58 >49 mg/dL    LDL Cholesterol Calculated 87 <100 mg/dL    Non HDL Cholesterol 105 <130 mg/dL   CBC with platelets and differential   Result Value Ref Range    WBC 7.6 4.0 - 11.0 10e9/L    RBC Count 4.00 3.8 - 5.2 10e12/L    Hemoglobin 12.9 11.7 - 15.7 g/dL    Hematocrit 38.4 35.0 - 47.0 %    MCV 96 78 - 100 fl    MCH 32.3 26.5 - 33.0 pg    MCHC 33.6 31.5 - 36.5 g/dL    RDW 13.6 10.0 - 15.0 %    Platelet Count 204 150 - 450 10e9/L    Diff Method Automated Method     % Neutrophils 63.7 %    % Lymphocytes 23.7 %    % Monocytes 9.8 %    % Eosinophils 2.5 %    % Basophils 0.3 %    Absolute Neutrophil 4.8 1.6 - 8.3 10e9/L    Absolute Lymphocytes 1.8 0.8 - 5.3 10e9/L    Absolute Monocytes 0.7 0.0 - 1.3 10e9/L    Absolute Eosinophils 0.2 0.0 - 0.7 10e9/L    Absolute Basophils 0.0 0.0 - 0.2 10e9/L   Comprehensive metabolic panel (BMP + Alb, Alk Phos, ALT, AST, Total. Bili, TP)   Result Value Ref Range    Sodium 142 133 - 144 mmol/L    Potassium 4.3 3.4 - 5.3 mmol/L    Chloride 106 94 - 109 mmol/L    Carbon Dioxide 28 20 - 32 mmol/L    Anion Gap 8 3 - 14 mmol/L    Glucose 102 (H) 70 - 99 mg/dL    Urea Nitrogen 22 7 - 30 mg/dL    Creatinine 0.72 0.52 - 1.04 mg/dL    GFR Estimate 78 >60 mL/min/1.7m2    GFR Estimate If Black >90 >60 mL/min/1.7m2    Calcium 8.3 (L) 8.5 - 10.1 mg/dL    Bilirubin Total 0.2 0.2 - 1.3 mg/dL     Albumin 3.5 3.4 - 5.0 g/dL    Protein Total 6.5 (L) 6.8 - 8.8 g/dL    Alkaline Phosphatase 98 40 - 150 U/L    ALT 13 0 - 50 U/L    AST 9 0 - 45 U/L       If you have any questions please call the clinic at [phone]    Sincerely,    Sunita Brito CNP  kml

## 2018-08-17 NOTE — TELEPHONE ENCOUNTER
Routing to RN; patient was seen today by Sunita Brito, please make sure patient gets her refills.    Liss Dobson

## 2018-08-18 LAB
ALBUMIN SERPL-MCNC: 3.5 G/DL (ref 3.4–5)
ALP SERPL-CCNC: 98 U/L (ref 40–150)
ALT SERPL W P-5'-P-CCNC: 13 U/L (ref 0–50)
ANION GAP SERPL CALCULATED.3IONS-SCNC: 8 MMOL/L (ref 3–14)
AST SERPL W P-5'-P-CCNC: 9 U/L (ref 0–45)
BILIRUB SERPL-MCNC: 0.2 MG/DL (ref 0.2–1.3)
BUN SERPL-MCNC: 22 MG/DL (ref 7–30)
CALCIUM SERPL-MCNC: 8.3 MG/DL (ref 8.5–10.1)
CHLORIDE SERPL-SCNC: 106 MMOL/L (ref 94–109)
CHOLEST SERPL-MCNC: 163 MG/DL
CO2 SERPL-SCNC: 28 MMOL/L (ref 20–32)
CREAT SERPL-MCNC: 0.72 MG/DL (ref 0.52–1.04)
GFR SERPL CREATININE-BSD FRML MDRD: 78 ML/MIN/1.7M2
GLUCOSE SERPL-MCNC: 102 MG/DL (ref 70–99)
HDLC SERPL-MCNC: 58 MG/DL
LDLC SERPL CALC-MCNC: 87 MG/DL
NONHDLC SERPL-MCNC: 105 MG/DL
POTASSIUM SERPL-SCNC: 4.3 MMOL/L (ref 3.4–5.3)
PROT SERPL-MCNC: 6.5 G/DL (ref 6.8–8.8)
SODIUM SERPL-SCNC: 142 MMOL/L (ref 133–144)
TRIGL SERPL-MCNC: 88 MG/DL

## 2018-08-18 ASSESSMENT — ANXIETY QUESTIONNAIRES: GAD7 TOTAL SCORE: 2

## 2018-08-18 ASSESSMENT — PATIENT HEALTH QUESTIONNAIRE - PHQ9: SUM OF ALL RESPONSES TO PHQ QUESTIONS 1-9: 2

## 2019-03-08 ENCOUNTER — TRANSFERRED RECORDS (OUTPATIENT)
Dept: HEALTH INFORMATION MANAGEMENT | Facility: CLINIC | Age: 79
End: 2019-03-08

## 2019-03-22 RX ORDER — CARBIDOPA AND LEVODOPA 50; 200 MG/1; MG/1
1 TABLET, EXTENDED RELEASE ORAL
Refills: 3 | COMMUNITY
Start: 2018-08-23

## 2019-03-22 RX ORDER — BUTALBITAL, ACETAMINOPHEN AND CAFFEINE 50; 325; 40 MG/1; MG/1; MG/1
1-2 TABLET ORAL 2 TIMES DAILY PRN
Refills: 0 | COMMUNITY
Start: 2018-10-02 | End: 2021-07-01

## 2019-09-05 ENCOUNTER — TRANSFERRED RECORDS (OUTPATIENT)
Dept: HEALTH INFORMATION MANAGEMENT | Facility: CLINIC | Age: 79
End: 2019-09-05

## 2019-09-12 ENCOUNTER — TELEPHONE (OUTPATIENT)
Dept: FAMILY MEDICINE | Facility: CLINIC | Age: 79
End: 2019-09-12

## 2019-09-12 ENCOUNTER — TRANSFERRED RECORDS (OUTPATIENT)
Dept: HEALTH INFORMATION MANAGEMENT | Facility: CLINIC | Age: 79
End: 2019-09-12

## 2019-09-12 DIAGNOSIS — K21.9 GASTROESOPHAGEAL REFLUX DISEASE WITHOUT ESOPHAGITIS: ICD-10-CM

## 2019-09-12 NOTE — TELEPHONE ENCOUNTER
Reason for Call:  Other prescription    Detailed comments: please send a prescription for ranitidine (ZANTAC) to [id number] - [place] [facility], [place] [id number] [facility] [place] [address] [facility]    Phone Number Patient can be reached at: Home number on file [phone] (home)    Best Time: any    Can we leave a detailed message on this number? YES    Call taken on 9/12/2019 at 2:55 PM by Mckenzie Galicia

## 2019-10-23 ENCOUNTER — TRANSFERRED RECORDS (OUTPATIENT)
Dept: HEALTH INFORMATION MANAGEMENT | Facility: CLINIC | Age: 79
End: 2019-10-23

## 2019-10-28 DIAGNOSIS — N39.41 URGENCY INCONTINENCE: ICD-10-CM

## 2019-10-28 DIAGNOSIS — N32.81 OVERACTIVE BLADDER: ICD-10-CM

## 2019-10-30 NOTE — TELEPHONE ENCOUNTER
Flagging for TC.      Please assist patient in scheduling an appointment.  Then route to provider to consider refill request.  Failed RN refill protocol/please see below.     Ellen Fields RN

## 2019-10-31 RX ORDER — OXYBUTYNIN CHLORIDE 10 MG/1
TABLET, EXTENDED RELEASE ORAL
Qty: 90 TABLET | Refills: 0 | Status: SHIPPED | OUTPATIENT
Start: 2019-10-31 | End: 2019-11-25

## 2019-10-31 NOTE — TELEPHONE ENCOUNTER
Reason for Call:  Other     Detailed comments: Patient scheduled on 11/13/19 for physical with pcp. Patient will call back on Monday to check status of refill.     Phone Number Patient can be reached at: Home number on file [phone] (home)    Best Time: Anytime    Can we leave a detailed message on this number? YES    Call taken on 10/31/2019 at 3:06 PM by Leslie Robertson

## 2019-10-31 NOTE — TELEPHONE ENCOUNTER
Patient scheduled 11/13.     Routing refill request to provider for review/approval because:  Patient needs to be seen because:  [place] refill cued.

## 2019-11-25 ENCOUNTER — OFFICE VISIT (OUTPATIENT)
Dept: FAMILY MEDICINE | Facility: CLINIC | Age: 79
End: 2019-11-25
Payer: COMMERCIAL

## 2019-11-25 VITALS
TEMPERATURE: 97.3 F | OXYGEN SATURATION: 98 % | HEART RATE: 94 BPM | HEIGHT: 63 IN | RESPIRATION RATE: 22 BRPM | WEIGHT: 150.2 LBS | SYSTOLIC BLOOD PRESSURE: 136 MMHG | BODY MASS INDEX: 26.61 KG/M2 | DIASTOLIC BLOOD PRESSURE: 78 MMHG

## 2019-11-25 DIAGNOSIS — G20.A1 PARKINSON'S DISEASE (H): ICD-10-CM

## 2019-11-25 DIAGNOSIS — N28.9 DECREASED RENAL FUNCTION: ICD-10-CM

## 2019-11-25 DIAGNOSIS — K21.9 GASTROESOPHAGEAL REFLUX DISEASE WITHOUT ESOPHAGITIS: ICD-10-CM

## 2019-11-25 DIAGNOSIS — Z00.00 ENCOUNTER FOR MEDICARE ANNUAL WELLNESS EXAM: Primary | ICD-10-CM

## 2019-11-25 DIAGNOSIS — L82.1 SEBORRHEIC KERATOSES: ICD-10-CM

## 2019-11-25 DIAGNOSIS — N32.81 OVERACTIVE BLADDER: ICD-10-CM

## 2019-11-25 DIAGNOSIS — Z13.220 SCREENING FOR HYPERLIPIDEMIA: ICD-10-CM

## 2019-11-25 DIAGNOSIS — I10 HYPERTENSION GOAL BP (BLOOD PRESSURE) < 140/90: ICD-10-CM

## 2019-11-25 DIAGNOSIS — L57.0 ACTINIC KERATOSIS: ICD-10-CM

## 2019-11-25 DIAGNOSIS — K55.9 ISCHEMIC COLITIS (H): ICD-10-CM

## 2019-11-25 DIAGNOSIS — N39.41 URGENCY INCONTINENCE: ICD-10-CM

## 2019-11-25 DIAGNOSIS — H91.93 DECREASED HEARING OF BOTH EARS: ICD-10-CM

## 2019-11-25 DIAGNOSIS — F33.1 MODERATE EPISODE OF RECURRENT MAJOR DEPRESSIVE DISORDER (H): ICD-10-CM

## 2019-11-25 LAB
ALBUMIN SERPL-MCNC: 3.8 G/DL (ref 3.4–5)
ALP SERPL-CCNC: 100 U/L (ref 40–150)
ALT SERPL W P-5'-P-CCNC: 12 U/L (ref 0–50)
ANION GAP SERPL CALCULATED.3IONS-SCNC: 6 MMOL/L (ref 3–14)
AST SERPL W P-5'-P-CCNC: 18 U/L (ref 0–45)
BILIRUB SERPL-MCNC: 0.4 MG/DL (ref 0.2–1.3)
BUN SERPL-MCNC: 24 MG/DL (ref 7–30)
CALCIUM SERPL-MCNC: 9.3 MG/DL (ref 8.5–10.1)
CHLORIDE SERPL-SCNC: 105 MMOL/L (ref 94–109)
CHOLEST SERPL-MCNC: 192 MG/DL
CO2 SERPL-SCNC: 26 MMOL/L (ref 20–32)
CREAT SERPL-MCNC: 1.03 MG/DL (ref 0.52–1.04)
ERYTHROCYTE [DISTWIDTH] IN BLOOD BY AUTOMATED COUNT: 13.8 % (ref 10–15)
GFR SERPL CREATININE-BSD FRML MDRD: 52 ML/MIN/{1.73_M2}
GLUCOSE SERPL-MCNC: 94 MG/DL (ref 70–99)
HCT VFR BLD AUTO: 43.9 % (ref 35–47)
HDLC SERPL-MCNC: 74 MG/DL
HGB BLD-MCNC: 14.1 G/DL (ref 11.7–15.7)
LDLC SERPL CALC-MCNC: 106 MG/DL
MCH RBC QN AUTO: 31.1 PG (ref 26.5–33)
MCHC RBC AUTO-ENTMCNC: 32.1 G/DL (ref 31.5–36.5)
MCV RBC AUTO: 97 FL (ref 78–100)
NONHDLC SERPL-MCNC: 118 MG/DL
PLATELET # BLD AUTO: 188 10E9/L (ref 150–450)
POTASSIUM SERPL-SCNC: 4.2 MMOL/L (ref 3.4–5.3)
PROT SERPL-MCNC: 7.2 G/DL (ref 6.8–8.8)
RBC # BLD AUTO: 4.54 10E12/L (ref 3.8–5.2)
SODIUM SERPL-SCNC: 137 MMOL/L (ref 133–144)
TRIGL SERPL-MCNC: 62 MG/DL
WBC # BLD AUTO: 10.4 10E9/L (ref 4–11)

## 2019-11-25 PROCEDURE — 80061 LIPID PANEL: CPT | Performed by: NURSE PRACTITIONER

## 2019-11-25 PROCEDURE — 80053 COMPREHEN METABOLIC PANEL: CPT | Performed by: NURSE PRACTITIONER

## 2019-11-25 PROCEDURE — 85027 COMPLETE CBC AUTOMATED: CPT | Performed by: NURSE PRACTITIONER

## 2019-11-25 PROCEDURE — 99397 PER PM REEVAL EST PAT 65+ YR: CPT | Performed by: NURSE PRACTITIONER

## 2019-11-25 PROCEDURE — 36415 COLL VENOUS BLD VENIPUNCTURE: CPT | Performed by: NURSE PRACTITIONER

## 2019-11-25 RX ORDER — OXYBUTYNIN CHLORIDE 10 MG/1
TABLET, EXTENDED RELEASE ORAL
Qty: 90 TABLET | Refills: 3 | Status: SHIPPED | OUTPATIENT
Start: 2019-11-25 | End: 2021-02-01

## 2019-11-25 RX ORDER — CYCLOSPORINE 0.5 MG/ML
1 EMULSION OPHTHALMIC 2 TIMES DAILY
COMMUNITY
End: 2022-01-01

## 2019-11-25 ASSESSMENT — MIFFLIN-ST. JEOR: SCORE: 1135.43

## 2019-11-25 ASSESSMENT — ACTIVITIES OF DAILY LIVING (ADL): CURRENT_FUNCTION: NO ASSISTANCE NEEDED

## 2019-11-25 NOTE — PATIENT INSTRUCTIONS
Patient Education   Personalized Prevention Plan  You are due for the preventive services outlined below.  Your care team is available to assist you in scheduling these services.  If you have already completed any of these items, please share that information with your care team to update in your medical record.  Health Maintenance Due   Topic Date Due     Zoster (Shingles) Vaccine (2 of 3) 06/09/2009     Depression Assessment  02/17/2019     Annual Wellness Visit  08/17/2019     Comprehensive Metabolic Panel  08/17/2019     Cholesterol Lab  08/17/2019     FALL RISK ASSESSMENT  08/17/2019     Complete Blood Count  08/17/2019     Flu Vaccine (1) 09/01/2019        At your visit today, we discussed your risk for falls and preventive options.    Fall Prevention  Falls often occur due to slipping, tripping or losing your balance. Millions of people fall every year and injure themselves. Here are ways to reduce your risk of falling again.    Think about your fall, was there anything that caused your fall that can be fixed, removed, or replaced?    Make your home safe by keeping walkways clear of objects you may trip over, such as electric cords.    Use non-slip pads under rugs. Don't use area rugs or small throw rugs.    Use non-slip mats in bathtubs and showers.    Install handrails and lights on staircases. The handrails should be on both sides of the stairs.    Don't walk in poorly lit areas.    Don't stand on chairs or wobbly ladders.    Use caution when reaching overhead or looking upward. This position can cause a loss of balance.    Be sure your shoes fit properly, have non-slip bottoms and are in good condition.     Wear shoes both inside and out. Don't go barefoot or wear slippers.    Be cautious when going up and down stairs, curbs, and when walking on uneven sidewalks.    If your balance is poor, consider using a cane or walker.    If your fall was related to alcohol use, stop or limit alcohol intake.     If  your fall was related to use of sleeping medicines, talk to your healthcare provider about this. You may need to reduce your dosage at bedtime if you awaken during the night to go to the bathroom.      To reduce the need for nighttime bathroom trips:  ? Don't drink fluids for several hours before going to bed  ? Empty your bladder before going to bed  ? Men can keep a urinal at the bedside    Stay as active as you can. Balance, flexibility, strength, and endurance all come from exercise. They all play a role in preventing falls. Ask your healthcare provider which types of activity are right for you.    Get your vision checked on a regular basis.    If you have pets, know where they are before you stand up or walk so you don't trip over them.    Use night lights.    Go over all your medicines with a pharmacist or other healthcare provider to see if any of them could make you more likely to fall.  Date Last Reviewed: 4/1/2018 2000-2018 [employer]. [address] [place] [address]. All rights reserved. This information is not intended as a substitute for professional medical care. Always follow your healthcare professional's instructions.    I would like you to apply Marcin's Vapor rub to your affected toenails daily.    Treatment  Although toenails fungi are usually treated with oral medication prescribed by a doctor, some people treat them with Vicks VapoRub. There is evidence proving that Vicks VapoRub can help with fungal toenails in some people. This is largely due to its ingredients, including menthol, camphor and eucalyptus oil. These ingredients may have antifungal properties that inhibit the growth of the fungus in the toenails and allow it to grow a new and healthy one. Marcin's should be applied to the affected nails every day.  Benefits  Vicks VapoRub has no side effects, which can make it a good choice for people who have problems with oral medications. Oral antifungal medications  can cause skin irritation in some patients and can also cause liver damage. Patients with congestive heart failure or liver disease may be unable to take an oral treatment, so Marcin VapoRub can be a good alternative for them.  Treatment  To treat toenail fungus with Vicks VapoRub, clean and trim your toenails. Wash them well and make sure they are completely dry. Coat the toenail with the Vicks, and be sure to get the gel deep into the sides of the nail and as far underneath as possible. If you have any cracks in your nail, rub deeply into them. Once the nail is covered, then wrap a light gauze bandage or adhesive bandage around the nail to protect the Vicks from rubbing off on to socks or something else. Leave it on and let the gel absorb into the nail. Repeat daily. The time it takes to notice results can begin after 4-5 months, depending upon how deep your fungus is.    NAIL FUNGUS / ONYCHOMYCOSIS   Nail fungus is not a hygiene problem and will not likely lead to significant medical   problems. The nails may get thick causing pain and possibly local skin infection.   Treatments include debridement (trimming), oral antifungals, topical antifungals and complete removal of the nail. Most fungal nails are not treated.   Topicals such as tea tree oil can be helpful for surface fungus and may, at best, limit   progression. Over the counter creams (such as Lamisil) can also be used however, their effectiveness is also quite low.  Topical treatment with Pen lac is expensive and often not covered by insurance. Pen lac has an approximate 8% success rate. Topical therapy recommendations is to apply twice a day for at least 3-4 months as it takes 9 months for new nail to grow out.     Experts suggest soaking your feet for 15 to 20 minutes in a mixture of 1 cup vinegar to 4 cups warm water. Be sure to rinse well and pat your feet dry when you're done. You can soak your feet like this daily. But if your skin becomes irritated,  try soaking only two to three times a week. Vicks VapoRub, as with vinegar, there have been no controlled clinical trials to assess the effectiveness of Vicks VapoRub on nail fungus, but there have been numerous anecdotal reports that it works. There's no consensus on how often to apply this product, so check with your doctor before using it on your nails.      Oral therapies include Sporanox and Lamisil. Oral therapies are also expensive and not very effective. Side effects such as liver disease are the main concern. Return of fungus is common even if the treatment worked.      Other Tips:  - Penlac nail medication apply daily x 4 months; remove old polish first day of each week  - Antifungal cream/powder (Zeasorb) - apply daily to feet and shoes x 2 months  - Clean shoes with Lysol or in washing machine every few weeks  - Rotate shoe gear; give them 24 hours to dry out between days wearing them  - Clean pair of socks in morning, clean pair in afternoon if your feet sweat  - Shower shoes used in public showers/pools     no

## 2019-11-25 NOTE — LETTER
[address] [id number] [phone]                                                                                                November 29, 2019    Laurie Orosco  [address]  [address]  [place] [facility] [place] [address]        Dear Ms. Kwesi,    Dear Laurie,     Your labs are all stable except your kidney function is decreased compared to last year.  I recommend that you return to the clinic to get this rechecked in about 6 to 8 weeks.     If you have any questions or concerns please feel free to contact me at the office at [phone] or via new test company[employer].     Sunita Brito, DNP, APRN, CNP     Results for orders placed or performed in visit on 11/25/19   COMPREHENSIVE METABOLIC PANEL     Status: Abnormal   Result Value Ref Range    Sodium 137 133 - 144 mmol/L    Potassium 4.2 3.4 - 5.3 mmol/L    Chloride 105 94 - 109 mmol/L    Carbon Dioxide 26 20 - 32 mmol/L    Anion Gap 6 3 - 14 mmol/L    Glucose 94 70 - 99 mg/dL    Urea Nitrogen 24 7 - 30 mg/dL    Creatinine 1.03 0.52 - 1.04 mg/dL    GFR Estimate 52 (L) >60 mL/min/[1.73_m2]    GFR Estimate If Black 61 >60 mL/min/[1.73_m2]    Calcium 9.3 8.5 - 10.1 mg/dL    Bilirubin Total 0.4 0.2 - 1.3 mg/dL    Albumin 3.8 3.4 - 5.0 g/dL    Protein Total 7.2 6.8 - 8.8 g/dL    Alkaline Phosphatase 100 40 - 150 U/L    ALT 12 0 - 50 U/L    AST 18 0 - 45 U/L   Lipid panel reflex to direct LDL Fasting     Status: Abnormal   Result Value Ref Range    Cholesterol 192 <200 mg/dL    Triglycerides 62 <150 mg/dL    HDL Cholesterol 74 >49 mg/dL    LDL Cholesterol Calculated 106 (H) <100 mg/dL    Non HDL Cholesterol 118 <130 mg/dL   CBC with Platelets       Status: None   Result Value Ref Range    WBC 10.4 4.0 - 11.0 10e9/L    RBC Count 4.54 3.8 - 5.2 10e12/L    Hemoglobin 14.1 11.7 - 15.7 g/dL    Hematocrit 43.9 35.0 - 47.0 %    MCV 97 78 - 100 fl    MCH 31.1 26.5 - 33.0 pg    MCHC 32.1 31.5 - 36.5 g/dL    RDW 13.8 10.0 - 15.0 %     Platelet Count 188 150 - 450 10e9/L

## 2019-11-25 NOTE — PROGRESS NOTES
"SUBJECTIVE:   Laurie Orosco is a 79 year old female who presents for Preventive Visit.    Are you in the first 12 months of your Medicare coverage?  No    Healthy Habits:    In general, how would you rate your overall health?  Good    Frequency of exercise:  1 day/week    Duration of exercise:  15-30 minutes    Do you usually eat at least 4 servings of fruit and vegetables a day, include whole grains    & fiber and avoid regularly eating high fat or \"junk\" foods?  No    Taking medications regularly:  Yes    Barriers to taking medications:  Not applicable    Medication side effects:  Not applicable    Ability to successfully perform activities of daily living:  No assistance needed    Home Safety:  No safety concerns identified    Hearing Impairment:  Difficulty following a conversation in a noisy restaurant or crowded room    In the past 6 months, have you been bothered by leaking of urine? Yes (takes Oxybutynin )    In general, how would you rate your overall mental or emotional health?  Good      PHQ-2 Total Score:    Additional concerns today:  Yes (bump on forehead, noticed a while ago  )    Do you feel safe in your environment? YES    Have you ever done Advance Care Planning? (For example, a Health Directive, POLST, or a discussion with a medical provider or your loved ones about your wishes): Yes, patient states has an Advance Care Planning document and will bring a copy to the clinic.      Fall risk  Fallen 2 or more times in the past year?: Yes  Any fall with injury in the past year?: No  Timed Up and Go Test (>13.5 is fall risk; contact physician) : 16  click delete button to remove this line now  Cognitive Screening  1) Repeat 3 items (Leader, Season, Table)    2) Clock draw: ABNORMAL wrote 11:05  3) 3 item recall: Recalls 2 objects   Results: ABNORMAL clock, 1-2 items recalled: PROBABLE COGNITIVE IMPAIRMENT, **INFORM PROVIDER**  Upon conversing the patient today during her annual visit today, noted " that patient has good memory in terms of her medical history.  Did not have any concerns today on her memory.  Consider cognitive screening with Mini-Mental status exam or Ruth at next visit.    Mini-CogTM Copyright DEREK Rico. Licensed by the author for use in [facility]; reprinted with permission (ruddy@[facility]). All rights reserved.      Do you have sleep apnea, excessive snoring or daytime drowsiness?: no    Reviewed and updated as needed this visit by clinical staff  Tobacco  Allergies  Meds  Problems  Med Hx  Surg Hx  Fam Hx  Soc Hx          Reviewed and updated as needed this visit by Provider  Tobacco  Allergies  Meds  Problems  Med Hx  Surg Hx  Fam Hx        Social History     Tobacco Use     Smoking status: Former Smoker     Types: Cigarettes     Last attempt to quit: 3/30/1960     Years since quittin.6     Smokeless tobacco: Never Used     Tobacco comment: Smoked cigarettes for one year when she was 22 years old.   Substance Use Topics     Alcohol use: Yes     Comment: 1 GLASS OF WINE EVERY 1-2 WEEKS     If you drink alcohol do you typically have >3 drinks per day or >7 drinks per week? No    No flowsheet data found.      Parkinson's and migraines: She is followed by neurology for these conditions.  She continues to be on propranolol and butalbital that is effective for her.  She states she does have some swallowing difficulties with dry foods such as bread.  She denies any worrisome choking episodes.  She modifies her diet to decrease problems with swallowing.    Osteoarthritis: She has had a left knee replacement.  She states her right knee is now bone-on-bone and she is followed at [facility].  She did get a gel injection into her right knee that was not effective for her pain.  She does not want to get knee surgery because her  has dementia.    She lives in a senior building with her  called [employer].  She has 2 children that live close by.    She is  bothered by right-sided hearing loss.  She would like to go to audiology.    Ischemic colitis: She reports she has episodes of abdominal cramping and diarrhea that last from 3-6 hours.  This will self resolve on its own without treatment.  Her last colonoscopy was in 2012.    Dry eyes: She is followed by optometry and on Restasis and sodium hyalurante prednisone eye drops for her dry eyes.  Her symptoms are improving but previously was having scratchy eyes, itchy eyes, and puffy eyes.    Current providers sharing in care for this patient include:   Patient Care Team:  Sunita Brito NP as PCP - General (Nurse Practitioner - Family)  Sunita Brito NP as Assigned PCP    The following health maintenance items are reviewed in Epic and correct as of today:  Health Maintenance   Topic Date Due     ZOSTER IMMUNIZATION (2 of 3) 06/09/2009     PHQ-9  02/17/2019     MEDICARE ANNUAL WELLNESS VISIT  08/17/2019     FALL RISK ASSESSMENT  08/17/2019     BMP  01/25/2020     CMP  11/25/2020     LIPID  11/25/2020     CBC  11/25/2020     ADVANCE CARE PLANNING  11/25/2024     DTAP/TDAP/TD IMMUNIZATION (4 - Td) 08/17/2028     DEXA  Completed     DEPRESSION ACTION PLAN  Completed     MIGRAINE ACTION PLAN  Completed     INFLUENZA VACCINE  Completed     PNEUMOCOCCAL IMMUNIZATION 65+ LOW/MEDIUM RISK  Completed     IPV IMMUNIZATION  Aged Out     MENINGITIS IMMUNIZATION  Aged Out     BP Readings from Last 3 Encounters:   11/25/19 136/78   08/17/18 108/70   11/09/17 129/89    Wt Readings from Last 3 Encounters:   11/25/19 68.1 kg (150 lb 3.2 oz)   08/17/18 67 kg (147 lb 12.8 oz)   07/21/17 70.8 kg (156 lb)                  Patient Active Problem List   Diagnosis     Migraine     Depression, major     Left knee DJD     Vaginal prolapse     OA (Osteoarthritis) of Knee, left     Breast microcalcifications     CARDIOVASCULAR SCREENING; LDL GOAL LESS THAN 160     Advance Care Planning     Acute venous embolism and thrombosis of deep  vessels of distal lower extremity (H)     Pulmonary embolism and infarction (H)     Liver hemangioma     GERD (gastroesophageal reflux disease)     Health Care Home     Ovarian cyst     Ischemic colitis (H)     LVH (left ventricular hypertrophy)     Hypertension goal BP (blood pressure) < 140/90     Parkinson's disease (H)     Urgency incontinence     TMJ (temporomandibular joint syndrome)     Myofascial muscle pain     Seborrheic keratoses     Actinic keratosis     Past Surgical History:   Procedure Laterality Date     HEMORRHOIDECTOMY       HYSTERECTOMY, PAP NO LONGER INDICATED       ORTHOPEDIC SURGERY      knee replacement 10/11     SURGICAL HISTORY OF -       vaginal repair, Dr. Mcmillan       Social History     Tobacco Use     Smoking status: Former Smoker     Types: Cigarettes     Last attempt to quit: 3/30/1960     Years since quittin.6     Smokeless tobacco: Never Used     Tobacco comment: Smoked cigarettes for one year when she was 22 years old.   Substance Use Topics     Alcohol use: Yes     Comment: 1 GLASS OF WINE EVERY 1-2 WEEKS     Family History   Problem Relation Age of Onset     Cancer Father         LUNG- SMOKER     Cancer Brother         kidney     Diabetes No family hx of      Hypertension No family hx of          Current Outpatient Medications   Medication Sig Dispense Refill     AMITRIPTYLINE HCL 50 MG OR TABS 3 TABLETS AT BEDTIME       butalbital-acetaminophen-caffeine (FIORICET/ESGIC) -40 MG tablet Take 1-2 tablets by mouth 2 times daily as needed for headaches Must last 30 days  0     butalbital-aspirin-caffeine (FIORINAL) capsule Take 1 capsule by mouth every 4 hours as needed for pain. 30 capsule 0     CALCIUM + D OR 1 CAPSULE DAILY       carbidopa-levodopa (SINEMET CR)  MG CR tablet Take 1 tablet by mouth 3 times daily  3     cycloSPORINE (RESTASIS) 0.05 % ophthalmic emulsion Place 1 drop into both eyes 2 times daily For dry eyes       FIBER COMPLETE TABS Take 1 tablet by  "mouth daily       FLUoxetine (PROZAC) 20 MG capsule Take 1 capsule (20 mg) by mouth daily Prescribed by psychiatrist 1 capsule 0     MELATONIN PO        oxybutynin ER (DITROPAN-XL) 10 MG 24 hr tablet TAKE 1 TABLET (10 MG) BY MOUTH DAILY 90 tablet 3     ranitidine (ZANTAC) 300 MG tablet TAKE 1 TABLET (300 MG) BY MOUTH AT BEDTIME 90 tablet 3     RISPERDAL 2 MG OR TABS 1 TABLET AT BEDTIME       UNABLE TO FIND MEDICATION NAME: Sod. hyalurante prednisone 0.001-25% eye drops for dry eyes       UNABLE TO FIND MEDICATION NAME: [employer] stool softner oral       propranolol (INDERAL) 10 MG tablet Take 1 tablet (10 mg) by mouth 2 times daily Prescribed by Neurologist       Allergies   Allergen Reactions     Baclofen      Lisinopril Cough     Morphine Sulfate Rash     Valium Rash       Review of Systems  Constitutional, HEENT, cardiovascular, pulmonary, GI, , musculoskeletal, neuro, skin, endocrine and psych systems are negative, except as otherwise noted.    OBJECTIVE:   /78 (BP Location: Right arm, Patient Position: Chair, Cuff Size: Adult Large)   Pulse 94   Temp 97.3  F (36.3  C) (Oral)   Resp 22   Ht 1.6 m (5' 3\")   Wt 68.1 kg (150 lb 3.2 oz)   SpO2 98%   BMI 26.61 kg/m   Estimated body mass index is 26.61 kg/m  as calculated from the following:    Height as of this encounter: 1.6 m (5' 3\").    Weight as of this encounter: 68.1 kg (150 lb 3.2 oz).  Physical Exam  GENERAL: healthy, alert and no distress  EYES: Eyes grossly normal to inspection, PERRL and conjunctivae and sclerae normal  HENT: ear canals and TM's normal, nose and mouth without ulcers or lesions.  Bony prominence of the frontal bone above the left eyebrow that is nontender.  The overlying skin is not erythematous or warm.  NECK: no adenopathy, no asymmetry, masses, or scars and thyroid normal to palpation  RESP: lungs clear to auscultation - no rales, rhonchi or wheezes  BREAST: normal without masses, tenderness or nipple discharge and no " palpable axillary masses or adenopathy  CV: regular rate and rhythm, normal S1 S2, no S3 or S4, no murmur, click or rub, no peripheral edema and peripheral pulses strong  ABDOMEN: soft, nontender, no hepatosplenomegaly, no masses and bowel sounds normal   (female): deferred per patient request  MS: no gross musculoskeletal defects noted, no edema  SKIN: Multiple hyperpigmented stuck on lesions consistent with seborrheic keratoses on the back and chest.  Multiple 1-2 mm rough, scaly, pale lesions on bilateral arms  NEURO: Normal strength and tone, mentation intact and speech normal  PSYCH: mentation appears normal, affect normal/bright  Feet: Thickened dystrophic toenails    Diagnostic Test Results:  Labs reviewed in Epic  Results for orders placed or performed in visit on 11/25/19   COMPREHENSIVE METABOLIC PANEL     Status: Abnormal   Result Value Ref Range    Sodium 137 133 - 144 mmol/L    Potassium 4.2 3.4 - 5.3 mmol/L    Chloride 105 94 - 109 mmol/L    Carbon Dioxide 26 20 - 32 mmol/L    Anion Gap 6 3 - 14 mmol/L    Glucose 94 70 - 99 mg/dL    Urea Nitrogen 24 7 - 30 mg/dL    Creatinine 1.03 0.52 - 1.04 mg/dL    GFR Estimate 52 (L) >60 mL/min/[1.73_m2]    GFR Estimate If Black 61 >60 mL/min/[1.73_m2]    Calcium 9.3 8.5 - 10.1 mg/dL    Bilirubin Total 0.4 0.2 - 1.3 mg/dL    Albumin 3.8 3.4 - 5.0 g/dL    Protein Total 7.2 6.8 - 8.8 g/dL    Alkaline Phosphatase 100 40 - 150 U/L    ALT 12 0 - 50 U/L    AST 18 0 - 45 U/L   Lipid panel reflex to direct LDL Fasting     Status: Abnormal   Result Value Ref Range    Cholesterol 192 <200 mg/dL    Triglycerides 62 <150 mg/dL    HDL Cholesterol 74 >49 mg/dL    LDL Cholesterol Calculated 106 (H) <100 mg/dL    Non HDL Cholesterol 118 <130 mg/dL   CBC with Platelets       Status: None   Result Value Ref Range    WBC 10.4 4.0 - 11.0 10e9/L    RBC Count 4.54 3.8 - 5.2 10e12/L    Hemoglobin 14.1 11.7 - 15.7 g/dL    Hematocrit 43.9 35.0 - 47.0 %    MCV 97 78 - 100 fl    MCH 31.1  26.5 - 33.0 pg    MCHC 32.1 31.5 - 36.5 g/dL    RDW 13.8 10.0 - 15.0 %    Platelet Count 188 150 - 450 10e9/L       ASSESSMENT / PLAN:   1. Encounter for Medicare annual wellness exam    2. Gastroesophageal reflux disease without esophagitis  Chronic, stable, continue current treatment.  - ranitidine (ZANTAC) 300 MG tablet; TAKE 1 TABLET (300 MG) BY MOUTH AT BEDTIME  Dispense: 90 tablet; Refill: 3    3. Urgency incontinence  Chronic, stable, continue current treatment.  Offered physical therapy for pelvic floor strengthening but patient declines at this time.  - oxybutynin ER (DITROPAN-XL) 10 MG 24 hr tablet; TAKE 1 TABLET (10 MG) BY MOUTH DAILY  Dispense: 90 tablet; Refill: 3    4. Overactive bladder  See #3  - oxybutynin ER (DITROPAN-XL) 10 MG 24 hr tablet; TAKE 1 TABLET (10 MG) BY MOUTH DAILY  Dispense: 90 tablet; Refill: 3    5. Parkinson's disease (H)  Patient will to continue to follow-up with neurology for management.  - CBC with Platelets      6. Hypertension goal BP (blood pressure) < 140/90  Chronic, stable, continue current treatment.  - COMPREHENSIVE METABOLIC PANEL  - **Basic metabolic panel FUTURE 2mo; Future    7. Ischemic colitis (H)  Chronic, stable, continue current treatment.  - CBC with Platelets      8. Screening for hyperlipidemia    - Lipid panel reflex to direct LDL Fasting    9. Decreased hearing of both ears    - AUDIOLOGY ADULT REFERRAL    10. Seborrheic keratoses  11. Actinic keratosis  Recommended dermatology referral for a full skin check but patient declines.    12. Moderate episode of recurrent major depressive disorder (H)  Chronic, stable, continue current treatment.  Patient will continue to follow with psychiatry for management.    13. Decreased renal function noticed on BMP today.  Recommend rechecking kidney function in 2 months.  - **Basic metabolic panel FUTURE 2mo; Future    COUNSELING:  Reviewed preventive health counseling, as reflected in patient instructions       Regular  "exercise       Healthy diet/nutrition       Hearing screening       Bladder control    Estimated body mass index is 26.61 kg/m  as calculated from the following:    Height as of this encounter: 1.6 m (5' 3\").    Weight as of this encounter: 68.1 kg (150 lb 3.2 oz).         reports that she quit smoking about 59 years ago. Her smoking use included cigarettes. She has never used smokeless tobacco.      Appropriate preventive services were discussed with this patient, including applicable screening as appropriate for cardiovascular disease, diabetes, osteopenia/osteoporosis, and glaucoma.  As appropriate for age/gender, discussed screening for colorectal cancer, prostate cancer, breast cancer, and cervical cancer. Checklist reviewing preventive services available has been given to the patient.    Reviewed patients plan of care and provided an AVS. The Intermediate Care Plan ( asthma action plan, low back pain action plan, and migraine action plan) for Laurie meets the Care Plan requirement. This Care Plan has been established and reviewed with the Patient.    Counseling Resources:  ATP IV Guidelines  Pooled Cohorts Equation Calculator  Breast Cancer Risk Calculator  FRAX Risk Assessment  ICSI Preventive Guidelines  Dietary Guidelines for Americans, 2010  USDA's MyPlate  ASA Prophylaxis  Lung CA Screening    Sunita Brito NP  [facility]    Identified Health Risks:  "

## 2020-01-23 ENCOUNTER — NURSE TRIAGE (OUTPATIENT)
Dept: FAMILY MEDICINE | Facility: CLINIC | Age: 80
End: 2020-01-23

## 2020-01-23 NOTE — TELEPHONE ENCOUNTER
Additional Information    Negative: Shock suspected (e.g., cold/pale/clammy skin, too weak to stand, low BP, rapid pulse)    Negative: Difficult to awaken or acting confused (e.g., disoriented, slurred speech)    Negative: Fainted, and still feels dizzy afterwards    Negative: Severe difficulty breathing (e.g., struggling for each breath, speaks in single words)    Negative: Overdose (accidental or intentional) of medications    Negative: New neurologic deficit that is present now: * Weakness of the face, arm, or leg on one side of the body * Numbness of the face, arm, or leg on one side of the body * Loss of speech or garbled speech    Negative: Heart beating < 50 beats per minute OR > 140 beats per minute    Negative: Sounds like a life-threatening emergency to the triager    Negative: Chest pain    Negative: Rectal bleeding, bloody stool, or tarry-black stool    Negative: Vomiting is the main symptom    Negative: Diarrhea is the main symptom    Negative: Headache is the main symptom    Negative: Heat exhaustion suspected (i.e., dehydration from heat exposure)    Negative: Patient states that he/she is having an anxiety/panic attack    Negative: SEVERE dizziness (e.g., unable to stand, requires support to walk, feels like passing out now)    Negative: Severe headache    Negative: Extra heart beats OR irregular heart beating (i.e., 'palpitations')    Negative: Difficulty breathing    Negative: Drinking very little and has signs of dehydration (e.g., no urine > 12 hours, very dry mouth, very lightheaded)    Negative: Follows bleeding (e.g., stomach, rectum, vagina) (Exception: became dizzy from sight of small amount blood)    Negative: Patient sounds very sick or weak to the triager    Negative: Lightheadedness (dizziness) present now, after 2 hours of rest and fluids    Negative: Spinning or tilting sensation (vertigo) present now    Negative: Fever > 103 F (39.4 C)    Negative: Fever > 100.0 F (37.8 C) and has  "diabetes mellitus or a weak immune system (e.g., HIV positive, cancer chemotherapy, organ transplant, splenectomy, chronic steroids)    Negative: Vomiting occurs with dizziness    Negative: Patient wants to be seen    Negative: Taking a medicine that could cause dizziness (e.g., blood pressure medications, diuretics)    Negative: Diabetes    Negative: Dizziness not present now, but is a chronic symptom (recurrent or ongoing AND lasting > 4 weeks)    Poor fluid intake probably causing dizziness    Answer Assessment - Initial Assessment Questions  1. DESCRIPTION: \"Describe your dizziness.\"      I feel a little dizzy when I get up  2. LIGHTHEADED: \"Do you feel lightheaded?\" (e.g., somewhat faint, woozy, weak upon standing)      yes  3. VERTIGO: \"Do you feel like either you or the room is spinning or tilting?\" (i.e. vertigo)      no  4. SEVERITY: \"How bad is it?\"  \"Do you feel like you are going to faint?\" \"Can you stand and walk?\"    - MILD - walking normally    - MODERATE - interferes with normal activities (e.g., work, school)     - SEVERE - unable to stand, requires support to walk, feels like passing out now.       mild  5. ONSET:  \"When did the dizziness begin?\"      I've been sick with a virus. The dizziness started today.  6. AGGRAVATING FACTORS: \"Does anything make it worse?\" (e.g., standing, change in head position)      no  7. HEART RATE: \"Can you tell me your heart rate?\" \"How many beats in 15 seconds?\"  (Note: not all patients can do this)        \"My heart is fine\"  8. CAUSE: \"What do you think is causing the dizziness?\"      A virus. I threw up a couple of days ago. I had a little diarrhea this morning.  9. RECURRENT SYMPTOM: \"Have you had dizziness before?\" If so, ask: \"When was the last time?\" \"What happened that time?\"      No.   10. OTHER SYMPTOMS: \"Do you have any other symptoms?\" (e.g., fever, chest pain, vomiting, diarrhea, bleeding)        See above  11. PREGNANCY: \"Is there any chance you are " "pregnant?\" \"When was your last menstrual period?\"        no    Protocols used: DIZZINESS-A-OH      "

## 2020-01-23 NOTE — TELEPHONE ENCOUNTER
Patient called back to say that she was also lightheaded/dizzy.  Thanks.  She will wait for the RN to call her.

## 2020-01-23 NOTE — TELEPHONE ENCOUNTER
Reason for call:  Patient reporting a symptom    Symptom or request: Patient calling stating that she has the chills, diarrhea,throwing up, and coughing. She is wondering if this is the flu and what is advised.    Duration (how long have symptoms been present): started Tuesday    Have you been treated for this before? No    Additional comments: Patient uses [facility]    Phone Number patient can be reached at:  Home number on file [phone] (home)    Best Time:  any    Can we leave a detailed message on this number:  YES    Call taken on 1/23/2020 at 12:44 PM by Sheri Orozco

## 2020-02-14 ENCOUNTER — TRANSFERRED RECORDS (OUTPATIENT)
Dept: HEALTH INFORMATION MANAGEMENT | Facility: CLINIC | Age: 80
End: 2020-02-14

## 2020-05-06 ENCOUNTER — TELEPHONE (OUTPATIENT)
Dept: FAMILY MEDICINE | Facility: CLINIC | Age: 80
End: 2020-05-06

## 2020-05-06 DIAGNOSIS — K21.9 GASTROESOPHAGEAL REFLUX DISEASE WITHOUT ESOPHAGITIS: Primary | ICD-10-CM

## 2020-05-06 RX ORDER — FAMOTIDINE 20 MG/1
20 TABLET, FILM COATED ORAL 2 TIMES DAILY
Qty: 180 TABLET | Refills: 3 | Status: SHIPPED | OUTPATIENT
Start: 2020-05-06 | End: 2021-05-11

## 2020-05-06 NOTE — TELEPHONE ENCOUNTER
Reason for Call:  Medication or medication refill:    Do you use a [facility]?  no    Name of the pharmacy and phone number for the current request:    [id number], [address] AT [address] [facility]    Name of the medication requested:  Ranitidine    Other request:  Her insurance will not cover this due to the recall.  Patient is requesting a different medication called into her pharmacy    Can we leave a detailed message on this number? YES    Phone number patient can be reached at: Home number on file [phone] (home)    Best Time:  Any    Call taken on 5/6/2020 at 1:48 PM by Asha Arzola

## 2020-05-06 NOTE — TELEPHONE ENCOUNTER
Ordered    Notify patient    Orders Placed This Encounter     famotidine (PEPCID) 20 MG tablet     Sig: Take 1 tablet (20 mg) by mouth 2 times daily     Dispense:  180 tablet     Refill:  3

## 2020-07-06 ENCOUNTER — TRANSFERRED RECORDS (OUTPATIENT)
Dept: HEALTH INFORMATION MANAGEMENT | Facility: CLINIC | Age: 80
End: 2020-07-06

## 2020-07-15 ENCOUNTER — TRANSFERRED RECORDS (OUTPATIENT)
Dept: HEALTH INFORMATION MANAGEMENT | Facility: CLINIC | Age: 80
End: 2020-07-15

## 2020-07-17 ENCOUNTER — TRANSFERRED RECORDS (OUTPATIENT)
Dept: HEALTH INFORMATION MANAGEMENT | Facility: CLINIC | Age: 80
End: 2020-07-17

## 2020-07-23 ENCOUNTER — TELEPHONE (OUTPATIENT)
Dept: FAMILY MEDICINE | Facility: CLINIC | Age: 80
End: 2020-07-23

## 2020-07-23 DIAGNOSIS — J34.9 SINUS DISEASE: Primary | ICD-10-CM

## 2020-07-23 NOTE — TELEPHONE ENCOUNTER
Reviewed below messages.    I recently saw a note from [facility] (I put it into the box for scanning into chart) but did not see an MRI report yet.    Please contact [facility] to ask for MRI report to be sent here for review.    ENT referral signed as requested.    Sunita Brito, DNP, APRN, CNP

## 2020-07-23 NOTE — TELEPHONE ENCOUNTER
Reason for Call: Request for an order or referral:    Order or referral being requested: needs referral to see Dr Altman for a disease in her sinus     Date needed: as soon as possible    Has the patient been seen by the PCP for this problem? NO    Phone number Patient can be reached at:  Home number on file [phone] (home)    Best Time:  any    Can we leave a detailed message on this number?  YES    Call taken on 7/23/2020 at 11:47 AM by Mckenzie Galicia

## 2020-07-23 NOTE — TELEPHONE ENCOUNTER
"RN spoke with patient.     She states that her MRI (ordered by neurology), revealed that she is having left sided sinus \"disease\" and that she needs to follow up with ENT. She was unable to provider further details. When called to schedule, she was told she needed a referral.     She saw Dr. Altman with [facility] in the past  (last visit 2016).     Patient seen my [facility], unable to see records or MRI results.     Routed to PCP to review and advise.     Cierra Fischer RN, BSN, PHN  [facility]: [place]    "

## 2020-07-27 NOTE — TELEPHONE ENCOUNTER
Called [facility] department, [phone], and requested to have the MRI report faxed to the CP team 4 fax machine, [phone].    Tarik Zimmerman

## 2020-07-28 NOTE — TELEPHONE ENCOUNTER
Just now seeing the message about the referral being completed - Called and [facility] informing patient that this referral is ready and she can call Dr. Altman's office to schedule at [phone].    MRI report received and placed in provider's folder to review.    Tarik Zimmerman

## 2020-07-29 ENCOUNTER — OFFICE VISIT (OUTPATIENT)
Dept: OTOLARYNGOLOGY | Facility: CLINIC | Age: 80
End: 2020-07-29
Payer: COMMERCIAL

## 2020-07-29 VITALS
WEIGHT: 144.2 LBS | HEART RATE: 89 BPM | DIASTOLIC BLOOD PRESSURE: 82 MMHG | SYSTOLIC BLOOD PRESSURE: 138 MMHG | BODY MASS INDEX: 25.54 KG/M2 | TEMPERATURE: 98.3 F | OXYGEN SATURATION: 95 %

## 2020-07-29 DIAGNOSIS — J32.0 CHRONIC MAXILLARY SINUSITIS: Primary | ICD-10-CM

## 2020-07-29 PROCEDURE — 99203 OFFICE O/P NEW LOW 30 MIN: CPT | Performed by: OTOLARYNGOLOGY

## 2020-07-29 RX ORDER — CEFDINIR 300 MG/1
300 CAPSULE ORAL 2 TIMES DAILY
Qty: 20 CAPSULE | Refills: 0 | Status: SHIPPED | OUTPATIENT
Start: 2020-07-29 | End: 2020-08-08

## 2020-07-29 NOTE — PROGRESS NOTES
I am seeing this patient in consultation for sinus disease at the request of the provider Dr. Sunita Brito.    Chief Complaint - sinusitis    History of Present Illness - Laurie Orosco is a 79 year old female who presents for evaluation of possible chronic sinusitis. The patient describes symptoms of left eye and cheek pressure and headaches. She denies discolored drainage. She has had left tooth pain. She had a crown fall off and then had it replaced recently. Tooth pain then went away yesterday. No nasal obstruction. No prior history of sinus surgery. She was seen by Dr. Altman in 2016 for left-sided face pain and headache. CT sinus 2016 (images reviewed ) then was normal.    Past Medical History -   Patient Active Problem List   Diagnosis     Migraine     Depression, major     Left knee DJD     Vaginal prolapse     OA (Osteoarthritis) of Knee, left     Breast microcalcifications     CARDIOVASCULAR SCREENING; LDL GOAL LESS THAN 160     Advance Care Planning     Acute venous embolism and thrombosis of deep vessels of distal lower extremity (H)     Pulmonary embolism and infarction (H)     Liver hemangioma     GERD (gastroesophageal reflux disease)     Health Care Home     Ovarian cyst     Ischemic colitis (H)     LVH (left ventricular hypertrophy)     Hypertension goal BP (blood pressure) < 140/90     Parkinson's disease (H)     Urgency incontinence     TMJ (temporomandibular joint syndrome)     Myofascial muscle pain     Seborrheic keratoses     Actinic keratosis       Current Medications -   Current Outpatient Medications:      AMITRIPTYLINE HCL 50 MG OR TABS, 3 TABLETS AT BEDTIME, Disp: , Rfl:      butalbital-acetaminophen-caffeine (FIORICET/ESGIC) -40 MG tablet, Take 1-2 tablets by mouth 2 times daily as needed for headaches Must last 30 days, Disp: , Rfl: 0     butalbital-aspirin-caffeine (FIORINAL) capsule, Take 1 capsule by mouth every 4 hours as needed for pain., Disp: 30 capsule, Rfl: 0      CALCIUM + D OR, 1 CAPSULE DAILY, Disp: , Rfl:      carbidopa-levodopa (SINEMET CR)  MG CR tablet, Take 1 tablet by mouth 3 times daily, Disp: , Rfl: 3     cycloSPORINE (RESTASIS) 0.05 % ophthalmic emulsion, Place 1 drop into both eyes 2 times daily For dry eyes, Disp: , Rfl:      famotidine (PEPCID) 20 MG tablet, Take 1 tablet (20 mg) by mouth 2 times daily, Disp: 180 tablet, Rfl: 3     FIBER COMPLETE TABS, Take 1 tablet by mouth daily, Disp: , Rfl:      FLUoxetine (PROZAC) 20 MG capsule, Take 1 capsule (20 mg) by mouth daily Prescribed by psychiatrist, Disp: 1 capsule, Rfl: 0     MELATONIN PO, , Disp: , Rfl:      oxybutynin ER (DITROPAN-XL) 10 MG 24 hr tablet, TAKE 1 TABLET (10 MG) BY MOUTH DAILY, Disp: 90 tablet, Rfl: 3     propranolol (INDERAL) 10 MG tablet, Take 1 tablet (10 mg) by mouth 2 times daily Prescribed by Neurologist, Disp: , Rfl:      RISPERDAL 2 MG OR TABS, 1 TABLET AT BEDTIME, Disp: , Rfl:      UNABLE TO FIND, MEDICATION NAME: Sod. hyalurante prednisone 0.001-25% eye drops for dry eyes, Disp: , Rfl:      UNABLE TO FIND, MEDICATION NAME: [facility] stool softner oral, Disp: , Rfl:     Allergies -   Allergies   Allergen Reactions     Baclofen      Lisinopril Cough     Morphine Sulfate Rash     Valium Rash       Social History -   Social History     Socioeconomic History     Marital status:      Spouse name: ANDREA RAMIREZ     Number of children: 2     Years of education: Not on file     Highest education level: Not on file   Occupational History     Employer: RETIRED   Social Needs     Financial resource strain: Not on file     Food insecurity     Worry: Not on file     Inability: Not on file     Transportation needs     Medical: Not on file     Non-medical: Not on file   Tobacco Use     Smoking status: Former Smoker     Types: Cigarettes     Last attempt to quit: 3/30/1960     Years since quittin.3     Smokeless tobacco: Never Used     Tobacco comment: Smoked cigarettes for one year  when she was 22 years old.   Substance and Sexual Activity     Alcohol use: Yes     Comment: 1 GLASS OF WINE EVERY 1-2 WEEKS     Drug use: No     Sexual activity: Not Currently   Lifestyle     Physical activity     Days per week: Not on file     Minutes per session: Not on file     Stress: Not on file   Relationships     Social connections     Talks on phone: Not on file     Gets together: Not on file     Attends Hinduism service: Not on file     Active member of club or organization: Not on file     Attends meetings of clubs or organizations: Not on file     Relationship status: Not on file     Intimate partner violence     Fear of current or ex partner: Not on file     Emotionally abused: Not on file     Physically abused: Not on file     Forced sexual activity: Not on file   Other Topics Concern     Parent/sibling w/ CABG, MI or angioplasty before 65F 55M? No   Social History Narrative     Not on file       Family History -   Family History   Problem Relation Age of Onset     Cancer Father         LUNG- SMOKER     Cancer Brother         kidney     Diabetes No family hx of      Hypertension No family hx of      Review of Systems - As per HPI and PMHx, otherwise 7 system review of the head and neck is negative.    Physical Exam  /82   Pulse 89   Temp 98.3  F (36.8  C) (Oral)   Wt 65.4 kg (144 lb 3.2 oz)   SpO2 95%   BMI 25.54 kg/m    General - The patient is nontoxic, in no distress. Alert and oriented to person and place, answers questions and cooperates with examination appropriately.   Neurologic - CN II-XII are intact. No focal neurologic deficits.   Voice and Breathing - The patient was breathing comfortably without the use of accessory muscles. There was no wheezing, stridor, or stertor.  The patients voice was clear and strong.  Eyes - Extraocular movements intact.  Sclera were not icteric or injected, conjunctiva were pink and moist.  Mouth - Examination of the oral cavity showed pink, healthy  oral mucosa. No lesions or ulcerations noted.  The tongue was mobile and midline.  Throat - The walls of the oropharynx were smooth, symmetric, and had no lesions or ulcerations.  No postnasal drainage.  The uvula was midline on elevation.  Nose - External contour is symmetric, no gross deflection or scars. no significant tenderness over the maxillary sinus or left maxillary gingiva. Nasal mucosa is pink and moist with no abnormal mucus.  The septum was midline and non-obstructive, turbinates of normal size and position.  No polyps, masses, or purulence noted on examination.  Neck - Soft, non-tender. Palpation of the occipital, submental, submandibular, internal jugular chain, and supraclavicular nodes did not demonstrate any abnormal lymph nodes or masses. No parotid masses. Palpation of the thyroid was soft and smooth, with no nodules or goiter appreciated.  The trachea was mobile and midline.        A/P - Laurie Orosco is a 79 year old female with left maxillary opacification on recent MRI for headache.  The patient has been seen in our clinic before for headache and left eye pain and pressure.  This was in 2016 and CT sinus was normal then.  She has a long history of discomfort in this area with a likely diagnosis of a form of headache.  However this time MRI did show left maxillary sinus opacification which could represent mucous retention cyst, fluid, or infection.  Her headache and pain on the side is similar to how it has been.  No new symptoms aside from a small amount of teeth pain but she had a recent crown fall off and it had to be replaced.  No purulent drainage.  After discussion of the options we elected to treat the opacification with Omnicef antibiotic.  If this does not help, or she develops new symptoms of purulent drainage, nasal congestion, or teeth pain we can consider additional treatments or a CT sinus to see if there is been improvement in the sinus opacification.      Enrique Lund  MD  Otolaryngology  [facility]

## 2020-07-29 NOTE — LETTER
7/29/2020         RE: Laurie Orosco  [address] [facility]  [address] [place] [id number]        Dear Colleague,    Thank you for referring your patient, Laurie Orosco, to the [facility]. Please see a copy of my visit note below.    I am seeing this patient in consultation for sinus disease at the request of the provider Dr. Sunita Brito.    Chief Complaint - sinusitis    History of Present Illness - Laurie Orosco is a 79 year old female who presents for evaluation of possible chronic sinusitis. The patient describes symptoms of left eye and cheek pressure and headaches. She denies discolored drainage. She has had left tooth pain. She had a crown fall off and then had it replaced recently. Tooth pain then went away yesterday. No nasal obstruction. No prior history of sinus surgery. She was seen by Dr. Altman in 2016 for left-sided face pain and headache. CT sinus 2016 (images reviewed ) then was normal.    Past Medical History -   Patient Active Problem List   Diagnosis     Migraine     Depression, major     Left knee DJD     Vaginal prolapse     OA (Osteoarthritis) of Knee, left     Breast microcalcifications     CARDIOVASCULAR SCREENING; LDL GOAL LESS THAN 160     Advance Care Planning     Acute venous embolism and thrombosis of deep vessels of distal lower extremity (H)     Pulmonary embolism and infarction (H)     Liver hemangioma     GERD (gastroesophageal reflux disease)     Health Care Home     Ovarian cyst     Ischemic colitis (H)     LVH (left ventricular hypertrophy)     Hypertension goal BP (blood pressure) < 140/90     Parkinson's disease (H)     Urgency incontinence     TMJ (temporomandibular joint syndrome)     Myofascial muscle pain     Seborrheic keratoses     Actinic keratosis       Current Medications -   Current Outpatient Medications:      AMITRIPTYLINE HCL 50 MG OR TABS, 3 TABLETS AT BEDTIME, Disp: , Rfl:      butalbital-acetaminophen-caffeine (FIORICET/ESGIC)  -40 MG tablet, Take 1-2 tablets by mouth 2 times daily as needed for headaches Must last 30 days, Disp: , Rfl: 0     butalbital-aspirin-caffeine (FIORINAL) capsule, Take 1 capsule by mouth every 4 hours as needed for pain., Disp: 30 capsule, Rfl: 0     CALCIUM + D OR, 1 CAPSULE DAILY, Disp: , Rfl:      carbidopa-levodopa (SINEMET CR)  MG CR tablet, Take 1 tablet by mouth 3 times daily, Disp: , Rfl: 3     cycloSPORINE (RESTASIS) 0.05 % ophthalmic emulsion, Place 1 drop into both eyes 2 times daily For dry eyes, Disp: , Rfl:      famotidine (PEPCID) 20 MG tablet, Take 1 tablet (20 mg) by mouth 2 times daily, Disp: 180 tablet, Rfl: 3     FIBER COMPLETE TABS, Take 1 tablet by mouth daily, Disp: , Rfl:      FLUoxetine (PROZAC) 20 MG capsule, Take 1 capsule (20 mg) by mouth daily Prescribed by psychiatrist, Disp: 1 capsule, Rfl: 0     MELATONIN PO, , Disp: , Rfl:      oxybutynin ER (DITROPAN-XL) 10 MG 24 hr tablet, TAKE 1 TABLET (10 MG) BY MOUTH DAILY, Disp: 90 tablet, Rfl: 3     propranolol (INDERAL) 10 MG tablet, Take 1 tablet (10 mg) by mouth 2 times daily Prescribed by Neurologist, Disp: , Rfl:      RISPERDAL 2 MG OR TABS, 1 TABLET AT BEDTIME, Disp: , Rfl:      UNABLE TO FIND, MEDICATION NAME: Sod. hyalurante prednisone 0.001-25% eye drops for dry eyes, Disp: , Rfl:      UNABLE TO FIND, MEDICATION NAME: [employer] stool softner oral, Disp: , Rfl:     Allergies -   Allergies   Allergen Reactions     Baclofen      Lisinopril Cough     Morphine Sulfate Rash     Valium Rash       Social History -   Social History     Socioeconomic History     Marital status:      Spouse name: ANDREA RAMIREZ     Number of children: 2     Years of education: Not on file     Highest education level: Not on file   Occupational History     Employer: RETIRED   Social Needs     Financial resource strain: Not on file     Food insecurity     Worry: Not on file     Inability: Not on file     Transportation needs     Medical: Not  on file     Non-medical: Not on file   Tobacco Use     Smoking status: Former Smoker     Types: Cigarettes     Last attempt to quit: 3/30/1960     Years since quittin.3     Smokeless tobacco: Never Used     Tobacco comment: Smoked cigarettes for one year when she was 22 years old.   Substance and Sexual Activity     Alcohol use: Yes     Comment: 1 GLASS OF WINE EVERY 1-2 WEEKS     Drug use: No     Sexual activity: Not Currently   Lifestyle     Physical activity     Days per week: Not on file     Minutes per session: Not on file     Stress: Not on file   Relationships     Social connections     Talks on phone: Not on file     Gets together: Not on file     Attends Sikh service: Not on file     Active member of club or organization: Not on file     Attends meetings of clubs or organizations: Not on file     Relationship status: Not on file     Intimate partner violence     Fear of current or ex partner: Not on file     Emotionally abused: Not on file     Physically abused: Not on file     Forced sexual activity: Not on file   Other Topics Concern     Parent/sibling w/ CABG, MI or angioplasty before 65F 55M? No   Social History Narrative     Not on file       Family History -   Family History   Problem Relation Age of Onset     Cancer Father         LUNG- SMOKER     Cancer Brother         kidney     Diabetes No family hx of      Hypertension No family hx of      Review of Systems - As per HPI and PMHx, otherwise 7 system review of the head and neck is negative.    Physical Exam  /82   Pulse 89   Temp 98.3  F (36.8  C) (Oral)   Wt 65.4 kg (144 lb 3.2 oz)   SpO2 95%   BMI 25.54 kg/m    General - The patient is nontoxic, in no distress. Alert and oriented to person and place, answers questions and cooperates with examination appropriately.   Neurologic - CN II-XII are intact. No focal neurologic deficits.   Voice and Breathing - The patient was breathing comfortably without the use of accessory  muscles. There was no wheezing, stridor, or stertor.  The patients voice was clear and strong.  Eyes - Extraocular movements intact.  Sclera were not icteric or injected, conjunctiva were pink and moist.  Mouth - Examination of the oral cavity showed pink, healthy oral mucosa. No lesions or ulcerations noted.  The tongue was mobile and midline.  Throat - The walls of the oropharynx were smooth, symmetric, and had no lesions or ulcerations.  No postnasal drainage.  The uvula was midline on elevation.  Nose - External contour is symmetric, no gross deflection or scars. no significant tenderness over the maxillary sinus or left maxillary gingiva. Nasal mucosa is pink and moist with no abnormal mucus.  The septum was midline and non-obstructive, turbinates of normal size and position.  No polyps, masses, or purulence noted on examination.  Neck - Soft, non-tender. Palpation of the occipital, submental, submandibular, internal jugular chain, and supraclavicular nodes did not demonstrate any abnormal lymph nodes or masses. No parotid masses. Palpation of the thyroid was soft and smooth, with no nodules or goiter appreciated.  The trachea was mobile and midline.        A/P - Laurie Orosco is a 79 year old female with left maxillary opacification on recent MRI for headache.  The patient has been seen in our clinic before for headache and left eye pain and pressure.  This was in 2016 and CT sinus was normal then.  She has a long history of discomfort in this area with a likely diagnosis of a form of headache.  However this time MRI did show left maxillary sinus opacification which could represent mucous retention cyst, fluid, or infection.  Her headache and pain on the side is similar to how it has been.  No new symptoms aside from a small amount of teeth pain but she had a recent crown fall off and it had to be replaced.  No purulent drainage.  After discussion of the options we elected to treat the opacification with  Omnicef antibiotic.  If this does not help, or she develops new symptoms of purulent drainage, nasal congestion, or teeth pain we can consider additional treatments or a CT sinus to see if there is been improvement in the sinus opacification.      Enrique Lund MD  Otolaryngology  [facility]      Again, thank you for allowing me to participate in the care of your patient.        Sincerely,        Enrique Lund MD

## 2020-07-29 NOTE — PATIENT INSTRUCTIONS
- left maxillary sinus opacification - this could be a cyst, fluid, or infection.  - take antibiotics for 10 days  - if symptoms worsen (teeth pain, discolored drainage, nasal congestion) return to clinic.

## 2020-07-31 ENCOUNTER — TELEPHONE (OUTPATIENT)
Dept: OTOLARYNGOLOGY | Facility: CLINIC | Age: 80
End: 2020-07-31

## 2020-07-31 NOTE — TELEPHONE ENCOUNTER
Reason for call:  Other   Patient called regarding (reason for call): call back  Additional comments: Patient is calling about cefdinir (OMNICEF) 300 MG capsule  Patient is wondering if she can take something else because it has been giving her headaches. Please call to advise.    Phone number to reach patient:  Home number on file [phone] (home)    Best Time:  any    Can we leave a detailed message on this number?  YES    Travel screening: Negative

## 2020-07-31 NOTE — TELEPHONE ENCOUNTER
Patient is checking status of previous message and states she would like a call back before the weekend. Thank you.

## 2020-07-31 NOTE — TELEPHONE ENCOUNTER
Patient is returning call about medicine, unhappy she didn't get a return call. Advised her Dr. Lund was not in today and was sent to patient relations. Please call to advise.

## 2020-08-03 DIAGNOSIS — J32.0 CHRONIC MAXILLARY SINUSITIS: Primary | ICD-10-CM

## 2020-08-03 RX ORDER — DOXYCYCLINE 100 MG/1
100 CAPSULE ORAL 2 TIMES DAILY
Qty: 28 CAPSULE | Refills: 0 | Status: SHIPPED | OUTPATIENT
Start: 2020-08-03 | End: 2020-08-17

## 2020-08-03 NOTE — TELEPHONE ENCOUNTER
Called pt who states omnicef gave her a terrible headache so she only took a day 1 and a half doses. She then stopped and her headache went away. It helped with some of her s/s but they are still present. I let her know I would speak to Dr Lund and let her know the outcome    [facility] [address] [place]    Elva Molina RN Specialty Triage 8/3/2020 11:18 AM

## 2020-08-03 NOTE — TELEPHONE ENCOUNTER
Called pt and let her know about ERX and educated on avoidance of sun and take medication at different time than vitamins and dairy.    Elva Molina, CYNDEE Specialty Triage 8/3/2020 11:59 AM

## 2020-08-03 NOTE — PROGRESS NOTES
Doxycycline sent to pharmacy. Please remind her to avoid sun and take medication at different time than vitamins and dairy.

## 2020-08-10 ENCOUNTER — TRANSFERRED RECORDS (OUTPATIENT)
Dept: HEALTH INFORMATION MANAGEMENT | Facility: CLINIC | Age: 80
End: 2020-08-10

## 2020-09-10 ENCOUNTER — TELEPHONE (OUTPATIENT)
Dept: OTOLARYNGOLOGY | Facility: CLINIC | Age: 80
End: 2020-09-10

## 2020-09-10 NOTE — TELEPHONE ENCOUNTER
Reason for call:  Other   Patient called regarding (reason for call): call back  Additional comments: Patient is calling to speak with the doctor about her sinus'. She claims that they are not getting any better, and she has pressure pain along with that. Please call back to discuss.    Phone number to reach patient:  Home number on file [phone] (home)    Best Time:  Any     Can we leave a detailed message on this number?  YES    Travel screening: Negative

## 2020-09-11 DIAGNOSIS — J32.0 CHRONIC MAXILLARY SINUSITIS: Primary | ICD-10-CM

## 2020-09-11 NOTE — TELEPHONE ENCOUNTER
Attempted to call the pt back, but her phone tells me she is on a call already.    Elva Molina RN Specialty Triage 9/11/2020 10:25 AM

## 2020-09-11 NOTE — PROGRESS NOTES
I still am concerned her left face pain is headache. I recommend a CT sinus to see if there is anything there. Order is placed.

## 2020-09-11 NOTE — TELEPHONE ENCOUNTER
Returned call to pt her left side is still bothering her it did not improve after the doxy, it has remained the same. She is wondering what the next steps are. I let her know I would inquire and get back to her. She states MD looked at her MRI that was done with Neuro.    Elva Molina, RN Specialty Triage 9/11/2020 10:35 AM

## 2020-09-11 NOTE — TELEPHONE ENCOUNTER
Called dagoberto and gave her the number to schedule her CT sinus scan. She will do so and I let her know Dr mccarthy will call her with the results.    Elva Molina RN

## 2020-09-29 ENCOUNTER — ANCILLARY PROCEDURE (OUTPATIENT)
Dept: CT IMAGING | Facility: CLINIC | Age: 80
End: 2020-09-29
Attending: OTOLARYNGOLOGY
Payer: COMMERCIAL

## 2020-09-29 ENCOUNTER — TELEPHONE (OUTPATIENT)
Dept: OTOLARYNGOLOGY | Facility: CLINIC | Age: 80
End: 2020-09-29

## 2020-09-29 DIAGNOSIS — J32.0 CHRONIC MAXILLARY SINUSITIS: ICD-10-CM

## 2020-09-29 PROCEDURE — 70486 CT MAXILLOFACIAL W/O DYE: CPT | Mod: TC

## 2020-09-29 NOTE — TELEPHONE ENCOUNTER
CT results discussed with patient. She has lucencies around her posterior left maxillary molar. This maybe an issue. The sinuses look clear, no infection. She also maybe having headache phenomenon as she has had this before. I recommend she see her dentist and neurology.

## 2020-10-05 ENCOUNTER — TRANSFERRED RECORDS (OUTPATIENT)
Dept: HEALTH INFORMATION MANAGEMENT | Facility: CLINIC | Age: 80
End: 2020-10-05

## 2021-01-01 ENCOUNTER — TELEPHONE (OUTPATIENT)
Dept: FAMILY MEDICINE | Facility: CLINIC | Age: 81
End: 2021-01-01
Payer: COMMERCIAL

## 2021-01-01 ENCOUNTER — TELEPHONE (OUTPATIENT)
Dept: FAMILY MEDICINE | Facility: CLINIC | Age: 81
End: 2021-01-01

## 2021-01-01 ENCOUNTER — LAB (OUTPATIENT)
Dept: LAB | Facility: CLINIC | Age: 81
End: 2021-01-01
Payer: COMMERCIAL

## 2021-01-01 ENCOUNTER — TELEPHONE (OUTPATIENT)
Dept: GASTROENTEROLOGY | Facility: CLINIC | Age: 81
End: 2021-01-01
Payer: COMMERCIAL

## 2021-01-01 ENCOUNTER — VIRTUAL VISIT (OUTPATIENT)
Dept: FAMILY MEDICINE | Facility: CLINIC | Age: 81
End: 2021-01-01
Payer: COMMERCIAL

## 2021-01-01 ENCOUNTER — HEALTH MAINTENANCE LETTER (OUTPATIENT)
Age: 81
End: 2021-01-01

## 2021-01-01 DIAGNOSIS — N76.0 BACTERIAL VAGINOSIS: ICD-10-CM

## 2021-01-01 DIAGNOSIS — B96.89 BACTERIAL VAGINOSIS: ICD-10-CM

## 2021-01-01 DIAGNOSIS — N89.8 VAGINAL DISCHARGE: ICD-10-CM

## 2021-01-01 DIAGNOSIS — R30.0 DYSURIA: Primary | ICD-10-CM

## 2021-01-01 DIAGNOSIS — R30.0 DYSURIA: ICD-10-CM

## 2021-01-01 DIAGNOSIS — N30.00 ACUTE CYSTITIS WITHOUT HEMATURIA: Primary | ICD-10-CM

## 2021-01-01 DIAGNOSIS — N30.00 ACUTE CYSTITIS WITHOUT HEMATURIA: ICD-10-CM

## 2021-01-01 DIAGNOSIS — B37.31 YEAST INFECTION OF THE VAGINA: Primary | ICD-10-CM

## 2021-01-01 DIAGNOSIS — B18.2 CHRONIC HEPATITIS C WITHOUT HEPATIC COMA (H): ICD-10-CM

## 2021-01-01 LAB
ALBUMIN UR-MCNC: NEGATIVE MG/DL
APPEARANCE UR: CLEAR
BACTERIA #/AREA URNS HPF: ABNORMAL /HPF
BACTERIA UR CULT: NORMAL
BILIRUB UR QL STRIP: NEGATIVE
CLUE CELLS: PRESENT
COLOR UR AUTO: YELLOW
GLUCOSE UR STRIP-MCNC: NEGATIVE MG/DL
HCV RNA SERPL NAA+PROBE-ACNC: NOT DETECTED IU/ML
HGB UR QL STRIP: NEGATIVE
KETONES UR STRIP-MCNC: NEGATIVE MG/DL
LEUKOCYTE ESTERASE UR QL STRIP: ABNORMAL
NITRATE UR QL: NEGATIVE
PH UR STRIP: 6 [PH] (ref 5–7)
RBC #/AREA URNS AUTO: ABNORMAL /HPF
SP GR UR STRIP: 1.02 (ref 1–1.03)
SQUAMOUS #/AREA URNS AUTO: ABNORMAL /LPF
TRICHOMONAS, WET PREP: ABNORMAL
UROBILINOGEN UR STRIP-ACNC: 0.2 E.U./DL
WBC #/AREA URNS AUTO: ABNORMAL /HPF
WBC'S/HIGH POWER FIELD, WET PREP: ABNORMAL
YEAST, WET PREP: PRESENT

## 2021-01-01 PROCEDURE — 87210 SMEAR WET MOUNT SALINE/INK: CPT

## 2021-01-01 PROCEDURE — 36415 COLL VENOUS BLD VENIPUNCTURE: CPT

## 2021-01-01 PROCEDURE — 99213 OFFICE O/P EST LOW 20 MIN: CPT | Mod: 95 | Performed by: STUDENT IN AN ORGANIZED HEALTH CARE EDUCATION/TRAINING PROGRAM

## 2021-01-01 PROCEDURE — 87086 URINE CULTURE/COLONY COUNT: CPT

## 2021-01-01 PROCEDURE — 87522 HEPATITIS C REVRS TRNSCRPJ: CPT

## 2021-01-01 PROCEDURE — 81001 URINALYSIS AUTO W/SCOPE: CPT

## 2021-01-01 RX ORDER — METRONIDAZOLE 500 MG/1
500 TABLET ORAL 2 TIMES DAILY
Qty: 14 TABLET | Refills: 0 | Status: SHIPPED | OUTPATIENT
Start: 2021-01-01 | End: 2021-01-01

## 2021-01-01 RX ORDER — FLUCONAZOLE 150 MG/1
150 TABLET ORAL ONCE
Qty: 1 TABLET | Refills: 0 | Status: SHIPPED | OUTPATIENT
Start: 2021-01-01 | End: 2021-01-01

## 2021-01-01 RX ORDER — SULFAMETHOXAZOLE/TRIMETHOPRIM 800-160 MG
1 TABLET ORAL 2 TIMES DAILY
Qty: 6 TABLET | Refills: 0 | Status: SHIPPED | OUTPATIENT
Start: 2021-01-01 | End: 2021-01-01

## 2021-01-01 RX ORDER — SULFAMETHOXAZOLE/TRIMETHOPRIM 800-160 MG
1 TABLET ORAL 2 TIMES DAILY
Qty: 6 TABLET | Refills: 0 | Status: CANCELLED | OUTPATIENT
Start: 2021-01-01

## 2021-01-28 ENCOUNTER — TELEPHONE (OUTPATIENT)
Dept: FAMILY MEDICINE | Facility: CLINIC | Age: 81
End: 2021-01-28

## 2021-01-28 DIAGNOSIS — N32.81 OVERACTIVE BLADDER: ICD-10-CM

## 2021-01-28 DIAGNOSIS — N39.41 URGENCY INCONTINENCE: ICD-10-CM

## 2021-01-28 NOTE — TELEPHONE ENCOUNTER
Reason for Call:  Medication or medication refill:    Do you use a [facility]?  Name of the pharmacy and phone number for the current request:  [facility] [address] [address], [place] [phone]    Name of the medication requested: oxybutynin ER (DITROPAN-XL) 10 MG 24 hr tablet    Other request:     Can we leave a detailed message on this number? YES    Phone number patient can be reached at: Home number on file [phone] (home)    Best Time: any    Call taken on 1/28/2021 at 12:23 PM by Liss Dobson

## 2021-01-31 RX ORDER — OXYBUTYNIN CHLORIDE 10 MG/1
TABLET, EXTENDED RELEASE ORAL
Qty: 90 TABLET | Refills: 3 | OUTPATIENT
Start: 2021-01-31

## 2021-01-31 NOTE — TELEPHONE ENCOUNTER
I last saw this patient 11/2019.    Virtual visit (or office visit) would be needed for refills.  Please outreach to ask patient to schedule- if she wanted to do video visit for Medicare wellness visit that is fine (but needs to be video and not telephone).  Otherwise a telephone visit for med check is fine.    Sunita Brito, DNP, APRN, CNP

## 2021-02-01 RX ORDER — OXYBUTYNIN CHLORIDE 10 MG/1
TABLET, EXTENDED RELEASE ORAL
Qty: 90 TABLET | Refills: 0 | Status: SHIPPED | OUTPATIENT
Start: 2021-02-01 | End: 2021-04-27

## 2021-02-01 NOTE — TELEPHONE ENCOUNTER
Reason for Call:  Medication or medication refill:    Name of the medication requested: oxybutynin    Other request: Would like enough refill to last until her appointment on 2/8/2021    Can we leave a detailed message on this number? No    Phone number patient can be reached at: Home number on file [phone] (home)    Best Time: Any    Call taken on 2/1/2021 at 11:39 AM by Selina Broussard

## 2021-02-04 ENCOUNTER — NURSE TRIAGE (OUTPATIENT)
Dept: FAMILY MEDICINE | Facility: CLINIC | Age: 81
End: 2021-02-04

## 2021-02-04 NOTE — TELEPHONE ENCOUNTER
Reviewed nurse triage note below.  And agree with warm soaks.    If she wants to be seen sooner, she could do a video visit with me tomorrow at 9:20 am or with Yoko Wiley CNP.    Sunita Brito DNP, APRN, CNP

## 2021-02-04 NOTE — TELEPHONE ENCOUNTER
RN spoke with patient    Advised that if patient would like to be seen sooner than Monday, there were virtual visits available.    Patient declined having visit sooner. Stated she does not have a camera on her phone.  Patient stated she would soak her foot as advised.    RN advised patient if toe got wore or any streaking to call the clinic    Patient verbalized understanding.    Denzel Coburn RN, BSN, PHN  [facility]

## 2021-02-04 NOTE — TELEPHONE ENCOUNTER
"Cold call taken, patient has infected toe nail, has physical on Monday.   She says her right 2nd toe is \"infected\", noticed pain, redness, swelling to that toe by the toenail.   Denies streaking, fever or chills.    Is able to walk, denies injury.   See triage below.   She has an appointment Monday for a physical.   Says she cannot trim her own nails at home, too thick, usually goes for pedicures but has put off due to covid, nails are very long.        Home care advice given, warm soaks, call if streaking red before seen.    Routed to PCP as FYI, perhaps consider podiatry referral?    Akila Crews RN  [facility]         Additional Information    Negative: Followed an injury    Negative: Wound looks infected    Negative: Caused by an animal bite    Negative: Caused by frostbite    Negative: Athlete's Foot suspected (i.e., itchy red rash in web space between toes)    Negative: Foot pain is the main symptom    Negative: Foot is cool or blue in comparison to other foot    Negative: Purple or black skin on toe (EXCEPTION: simple recalled injury with bruise)    Negative: Patient sounds very sick or weak to the triager    Negative: SEVERE pain (e.g., excruciating, unable to do any normal activities) and not improved after 2 hours of pain medicine    Negative: Looks like a boil, infected sore, deep ulcer, or other infected rash (spreading redness, pus)    Negative: Yellow pus seen in skin around toenail (cuticle area), or pus seen under toenail    Negative: Numbness in one foot (i.e., loss of sensation)    Localized redness and swelling of skin around nail (i.e., cuticle area or nail fold)    Answer Assessment - Initial Assessment Questions  1. ONSET: \"When did the pain start?\"       2 days ago, started last week, got better and came back 2 days ago  2. LOCATION: \"Where is the pain located?\"   (e.g., around nail, entire toe, at foot joint)       Tip of right 2nd toe  3. PAIN: \"How bad is the " "pain?\"    (Scale 1-10; or mild, moderate, severe)    -  MILD (1-3): doesn't interfere with normal activities     -  MODERATE (4-7): interferes with normal activities (e.g., work or school) or awakens from sleep, limping     -  SEVERE (8-10): excruciating pain, unable to do any normal activities, unable to walk      5, is able to walk  4. APPEARANCE: \"What does the toe look like?\" (e.g., redness, swelling, bruising, pallor)      Tip of toe is red, warm, swollen  5. CAUSE: \"What do you think is causing the toe pain?\"      Maybe ingrown toenail, says the toenail is very long, usually gets pedicures, toenails very thick  6. OTHER SYMPTOMS: \"Do you have any other symptoms?\" (e.g., leg pain, rash, fever, numbness)      no  7. PREGNANCY: \"Is there any chance you are pregnant?\" \"When was your last menstrual period?\"      n/a    Protocols used: TOE PAIN-A-[place]      "

## 2021-02-05 ENCOUNTER — TRANSFERRED RECORDS (OUTPATIENT)
Dept: HEALTH INFORMATION MANAGEMENT | Facility: CLINIC | Age: 81
End: 2021-02-05

## 2021-02-08 PROBLEM — F25.9 SCHIZOAFFECTIVE DISORDER, CHRONIC CONDITION (H): Status: ACTIVE | Noted: 2021-02-08

## 2021-02-08 PROBLEM — F41.1 GENERALIZED ANXIETY DISORDER: Status: ACTIVE | Noted: 2021-02-08

## 2021-02-21 ENCOUNTER — HEALTH MAINTENANCE LETTER (OUTPATIENT)
Age: 81
End: 2021-02-21

## 2021-03-26 ENCOUNTER — TRANSFERRED RECORDS (OUTPATIENT)
Dept: HEALTH INFORMATION MANAGEMENT | Facility: CLINIC | Age: 81
End: 2021-03-26

## 2021-03-30 ENCOUNTER — TRANSFERRED RECORDS (OUTPATIENT)
Dept: HEALTH INFORMATION MANAGEMENT | Facility: CLINIC | Age: 81
End: 2021-03-30

## 2021-04-01 ENCOUNTER — TRANSFERRED RECORDS (OUTPATIENT)
Dept: HEALTH INFORMATION MANAGEMENT | Facility: CLINIC | Age: 81
End: 2021-04-01

## 2021-04-22 ENCOUNTER — TRANSFERRED RECORDS (OUTPATIENT)
Dept: HEALTH INFORMATION MANAGEMENT | Facility: CLINIC | Age: 81
End: 2021-04-22

## 2021-04-26 NOTE — TELEPHONE ENCOUNTER
"Requested Prescriptions   Pending Prescriptions Disp Refills     oxybutynin ER (DITROPAN-XL) 10 MG 24 hr tablet  Last Written Prescription Date:  8/17/2018  Last Fill Quantity: 90 tabs,  # refills: 3   Last office visit: 8/17/2018 with prescribing provider:  Alisha   Future Office Visit:   90 tablet 3     Sig: TAKE 1 TABLET (10 MG) BY MOUTH DAILY       Muscarinic Antagonists (Urinary Incontinence Agents) Failed - 10/28/2019  5:56 PM        Failed - Recent (12 mo) or future (30 days) visit within the authorizing provider's specialty     Patient has had an office visit with the authorizing provider or a provider within the authorizing providers department within the previous 12 mos or has a future within next 30 days. See \"Patient Info\" tab in inbasket, or \"Choose Columns\" in Meds & Orders section of the refill encounter.              Passed - Medication is Oxybutynin and patient is 5 years of age or older        Passed - Patient does not have a diagnosis of glaucoma on the problem list     If glaucoma diagnosis is new, refer refill to physician.          Passed - Medication is active on med list        Passed - Patient is 18 years of age or older         " No

## 2021-04-27 DIAGNOSIS — N39.41 URGENCY INCONTINENCE: ICD-10-CM

## 2021-04-27 DIAGNOSIS — N32.81 OVERACTIVE BLADDER: ICD-10-CM

## 2021-04-27 RX ORDER — OXYBUTYNIN CHLORIDE 10 MG/1
TABLET, EXTENDED RELEASE ORAL
Qty: 90 TABLET | Refills: 0 | Status: SHIPPED | OUTPATIENT
Start: 2021-04-27 | End: 2021-05-11

## 2021-04-27 NOTE — TELEPHONE ENCOUNTER
Routing refill request to provider for review/approval because:  Drug not on the [facility] refill protocol     Kathryn Trejo RN

## 2021-04-27 NOTE — TELEPHONE ENCOUNTER
Reason for Call:  Medication or medication refill:    Do you use a [facility]?  Name of the pharmacy and phone number for the current request:  [facility], [address]    Name of the medication requested: Oxybutynin 10mg    Other request: none    Can we leave a detailed message on this number? YES    Phone number patient can be reached at: Home number on file [phone] (home)    Best Time: anytime-after 9am    Call taken on 4/27/2021 at 9:28 AM by Юлия Arenas

## 2021-05-05 ENCOUNTER — TRANSFERRED RECORDS (OUTPATIENT)
Dept: HEALTH INFORMATION MANAGEMENT | Facility: CLINIC | Age: 81
End: 2021-05-05

## 2021-05-11 ENCOUNTER — OFFICE VISIT (OUTPATIENT)
Dept: FAMILY MEDICINE | Facility: CLINIC | Age: 81
End: 2021-05-11
Payer: COMMERCIAL

## 2021-05-11 VITALS
TEMPERATURE: 97.6 F | OXYGEN SATURATION: 97 % | DIASTOLIC BLOOD PRESSURE: 60 MMHG | HEART RATE: 92 BPM | BODY MASS INDEX: 23.85 KG/M2 | RESPIRATION RATE: 22 BRPM | HEIGHT: 63 IN | WEIGHT: 134.6 LBS | SYSTOLIC BLOOD PRESSURE: 130 MMHG

## 2021-05-11 DIAGNOSIS — K55.9 ISCHEMIC COLITIS (H): ICD-10-CM

## 2021-05-11 DIAGNOSIS — Z13.220 SCREENING FOR HYPERLIPIDEMIA: ICD-10-CM

## 2021-05-11 DIAGNOSIS — I10 HYPERTENSION GOAL BP (BLOOD PRESSURE) < 140/90: ICD-10-CM

## 2021-05-11 DIAGNOSIS — Z11.59 NEED FOR HEPATITIS C SCREENING TEST: ICD-10-CM

## 2021-05-11 DIAGNOSIS — N32.81 OVERACTIVE BLADDER: ICD-10-CM

## 2021-05-11 DIAGNOSIS — B18.2 CHRONIC HEPATITIS C WITHOUT HEPATIC COMA (H): ICD-10-CM

## 2021-05-11 DIAGNOSIS — Z00.00 ENCOUNTER FOR MEDICARE ANNUAL WELLNESS EXAM: Primary | ICD-10-CM

## 2021-05-11 DIAGNOSIS — G20.A1 PARKINSON'S DISEASE (H): ICD-10-CM

## 2021-05-11 DIAGNOSIS — K21.9 GASTROESOPHAGEAL REFLUX DISEASE WITHOUT ESOPHAGITIS: ICD-10-CM

## 2021-05-11 DIAGNOSIS — G43.909 MIGRAINE WITHOUT STATUS MIGRAINOSUS, NOT INTRACTABLE, UNSPECIFIED MIGRAINE TYPE: ICD-10-CM

## 2021-05-11 DIAGNOSIS — N39.41 URGENCY INCONTINENCE: ICD-10-CM

## 2021-05-11 LAB
ALBUMIN SERPL-MCNC: 3.6 G/DL (ref 3.4–5)
ALP SERPL-CCNC: 86 U/L (ref 40–150)
ALT SERPL W P-5'-P-CCNC: 10 U/L (ref 0–50)
ANION GAP SERPL CALCULATED.3IONS-SCNC: 5 MMOL/L (ref 3–14)
AST SERPL W P-5'-P-CCNC: 21 U/L (ref 0–45)
BILIRUB SERPL-MCNC: 0.3 MG/DL (ref 0.2–1.3)
BUN SERPL-MCNC: 23 MG/DL (ref 7–30)
CALCIUM SERPL-MCNC: 8.9 MG/DL (ref 8.5–10.1)
CHLORIDE SERPL-SCNC: 106 MMOL/L (ref 94–109)
CHOLEST SERPL-MCNC: 185 MG/DL
CO2 SERPL-SCNC: 28 MMOL/L (ref 20–32)
CREAT SERPL-MCNC: 0.86 MG/DL (ref 0.52–1.04)
ERYTHROCYTE [DISTWIDTH] IN BLOOD BY AUTOMATED COUNT: 13.9 % (ref 10–15)
GFR SERPL CREATININE-BSD FRML MDRD: 65 ML/MIN/{1.73_M2}
GLUCOSE SERPL-MCNC: 94 MG/DL (ref 70–99)
HCT VFR BLD AUTO: 40.4 % (ref 35–47)
HDLC SERPL-MCNC: 73 MG/DL
HGB BLD-MCNC: 12.9 G/DL (ref 11.7–15.7)
LDLC SERPL CALC-MCNC: 99 MG/DL
MCH RBC QN AUTO: 31.2 PG (ref 26.5–33)
MCHC RBC AUTO-ENTMCNC: 31.9 G/DL (ref 31.5–36.5)
MCV RBC AUTO: 98 FL (ref 78–100)
NONHDLC SERPL-MCNC: 112 MG/DL
PLATELET # BLD AUTO: 197 10E9/L (ref 150–450)
POTASSIUM SERPL-SCNC: 4.2 MMOL/L (ref 3.4–5.3)
PROT SERPL-MCNC: 6.6 G/DL (ref 6.8–8.8)
RBC # BLD AUTO: 4.13 10E12/L (ref 3.8–5.2)
SODIUM SERPL-SCNC: 139 MMOL/L (ref 133–144)
TRIGL SERPL-MCNC: 63 MG/DL
WBC # BLD AUTO: 9.4 10E9/L (ref 4–11)

## 2021-05-11 PROCEDURE — 80053 COMPREHEN METABOLIC PANEL: CPT | Performed by: NURSE PRACTITIONER

## 2021-05-11 PROCEDURE — 85027 COMPLETE CBC AUTOMATED: CPT | Performed by: NURSE PRACTITIONER

## 2021-05-11 PROCEDURE — 99000 SPECIMEN HANDLING OFFICE-LAB: CPT | Performed by: NURSE PRACTITIONER

## 2021-05-11 PROCEDURE — 87902 NFCT AGT GNTYP ALYS HEP C: CPT | Mod: 90 | Performed by: NURSE PRACTITIONER

## 2021-05-11 PROCEDURE — 80061 LIPID PANEL: CPT | Performed by: NURSE PRACTITIONER

## 2021-05-11 PROCEDURE — 86803 HEPATITIS C AB TEST: CPT | Performed by: NURSE PRACTITIONER

## 2021-05-11 PROCEDURE — 36415 COLL VENOUS BLD VENIPUNCTURE: CPT | Performed by: NURSE PRACTITIONER

## 2021-05-11 PROCEDURE — 99397 PER PM REEVAL EST PAT 65+ YR: CPT | Performed by: NURSE PRACTITIONER

## 2021-05-11 PROCEDURE — 93000 ELECTROCARDIOGRAM COMPLETE: CPT | Performed by: NURSE PRACTITIONER

## 2021-05-11 PROCEDURE — 87522 HEPATITIS C REVRS TRNSCRPJ: CPT | Performed by: NURSE PRACTITIONER

## 2021-05-11 RX ORDER — FAMOTIDINE 20 MG/1
20 TABLET, FILM COATED ORAL 2 TIMES DAILY
Qty: 180 TABLET | Refills: 3 | Status: SHIPPED | OUTPATIENT
Start: 2021-05-11 | End: 2022-01-01

## 2021-05-11 RX ORDER — FLUOXETINE 10 MG/1
10 TABLET, FILM COATED ORAL DAILY
COMMUNITY

## 2021-05-11 RX ORDER — OXYBUTYNIN CHLORIDE 10 MG/1
TABLET, EXTENDED RELEASE ORAL
Qty: 90 TABLET | Refills: 3 | Status: SHIPPED | OUTPATIENT
Start: 2021-05-11 | End: 2022-01-01

## 2021-05-11 ASSESSMENT — MIFFLIN-ST. JEOR: SCORE: 1059.67

## 2021-05-11 NOTE — PROGRESS NOTES
"  SUBJECTIVE:   Laurie Orosco is a 80 year old female who presents for Preventive Visit.    Patient has been advised of split billing requirements and indicates understanding: Yes  Are you in the first 12 months of your Medicare Part B coverage?  No    Fell over a cement stone at [facility] and broke right knee cap.  Has been getting care/treatment through [facility] for this.  Anticipates that she will need knee surgery this summer.    Parkinsons: She was diagnosed for Parkinson's in 10/2014.  She is followed by neurology at [facility] neurologic clinic.  Unsteady and balance is off.  Has a cane at home but does not use it much.    She was referred to the balance clinic by Dr. Rasta Godoy and she states she has not gone yet but she plans to do so.    Migraines: She continues to get these but they are stable.  She is on propranolol and butalbital as needed.  She is followed by the [facility] neurologic clinic for this.    Ischemic colitis: She states she does get some flares of this from time to time that last a couple hours.  When she gets a flareup she gets diarrhea and she has to be near a bathroom.    Urinary incontinence: She is on oxybutynin for this which is helping.  She wears depends.  She does get dry mouth as a side effect.  She wants to continue with the oxybutynin and she is aware of the risk associated with use of this in older age.    Physical Health:    In general, how would you rate your overall physical health? fair    Outside of work, how many days during the week do you exercise? none    Outside of work, approximately how many minutes a day do you exercisenot applicable  If you drink alcohol do you typically have >3 drinks per day or >7 drinks per week? Yes - AUDIT SCORE: - not currently      Do you usually eat at least 4 servings of fruit and vegetables a day, include whole grains & fiber and avoid regularly eating high fat or \"junk\" foods? NO    Do you have any problems taking medications " regularly?  YES    Do you have any side effects from medications? dry mouth     Needs assistance for the following daily activities: no assistance needed    Which of the following safety concerns are present in your home?  none identified     Hearing impairment: No    In the past 6 months, have you been bothered by leaking of urine? yes    Mental Health:    In general, how would you rate your overall mental or emotional health? fair  PHQ-2 Score:      Do you feel safe in your environment? YES    Have you ever done Advance Care Planning? (For example, a Health Directive, POLST, or a discussion with a medical provider or your loved ones about your wishes): Yes, patient states has an Advance Care Planning document and will bring a copy to the clinic.    Additional concerns to address?  No    Fall risk:  Fallen 2 or more times in the past year?: No  Any fall with injury in the past year?: Yes(knee needs surg.)  Timed Up and Go Test (>13.5 is fall risk; contact physician) : 16    Cognitive Screenin) Repeat 3 items (Leader, Season, Table)      2) Clock draw: ABNORMAL  11:05  3) 3 item recall: Recalls 2 objects   Results: ABNORMAL clock, 1-2 items recalled: PROBABLE COGNITIVE IMPAIRMENT, **INFORM PROVIDER**    Mini-CogTM Copyright S Jacky. Licensed by the author for use in [facility]; reprinted with permission (ruddy@.[facility]). All rights reserved.      Do you have sleep apnea, excessive snoring or daytime drowsiness?: no      Reviewed and updated as needed this visit by clinical staff  Tobacco  Allergies  Meds  Problems  Med Hx  Surg Hx  Fam Hx  Soc Hx          Reviewed and updated as needed this visit by Provider  Tobacco   Meds  Problems  Med Hx  Surg Hx  Fam Hx  Soc Hx       Social History     Tobacco Use     Smoking status: Former Smoker     Types: Cigarettes     Quit date: 3/30/1960     Years since quittin.1     Smokeless tobacco: Never Used     Tobacco comment: Smoked cigarettes  for one year when she was 22 years old.   Substance Use Topics     Alcohol use: Yes     Comment: 1 GLASS OF WINE EVERY 1-2 WEEKS                           Current providers sharing in care for this patient include:   Patient Care Team:  Sunita Brito NP as PCP - General (Nurse Practitioner - Family)  Sunita Brito NP as Assigned PCP  Enrique Lund MD as Assigned Surgical Provider    The following health maintenance items are reviewed in Epic and correct as of today:  Health Maintenance   Topic Date Due     HEPATITIS B IMMUNIZATION (1 of 3 - Risk 3-dose series) Never done     ZOSTER IMMUNIZATION (2 of 3) 06/09/2009     PHQ-9  02/17/2019     MEDICARE ANNUAL WELLNESS VISIT  05/11/2022     CMP  05/11/2022     LIPID  05/11/2022     ANNUAL REVIEW OF HM ORDERS  05/11/2022     FALL RISK ASSESSMENT  05/11/2022     CBC  05/11/2022     DEXA  04/27/2025     ADVANCE CARE PLANNING  05/18/2026     DTAP/TDAP/TD IMMUNIZATION (3 - Td) 08/17/2028     DEPRESSION ACTION PLAN  Completed     MIGRAINE ACTION PLAN  Completed     INFLUENZA VACCINE  Completed     Pneumococcal Vaccine: 65+ Years  Completed     COVID-19 Vaccine  Completed     IPV IMMUNIZATION  Aged Out     MENINGITIS IMMUNIZATION  Aged Out     BP Readings from Last 3 Encounters:   05/11/21 130/60   07/29/20 138/82   11/25/19 136/78    Wt Readings from Last 3 Encounters:   05/11/21 61.1 kg (134 lb 9.6 oz)   07/29/20 65.4 kg (144 lb 3.2 oz)   11/25/19 68.1 kg (150 lb 3.2 oz)                  Patient Active Problem List   Diagnosis     Migraine     Depression, major     Left knee DJD     Vaginal prolapse     OA (Osteoarthritis) of Knee, left     Breast microcalcifications     CARDIOVASCULAR SCREENING; LDL GOAL LESS THAN 160     Advance Care Planning     Acute venous embolism and thrombosis of deep vessels of distal lower extremity (H)     Pulmonary embolism and infarction (H)     Liver hemangioma     GERD (gastroesophageal reflux disease)     Health Care Home      Ovarian cyst     Ischemic colitis (H)     LVH (left ventricular hypertrophy)     Hypertension goal BP (blood pressure) < 140/90     Parkinson's disease (H)     Urgency incontinence     TMJ (temporomandibular joint syndrome)     Myofascial muscle pain     Seborrheic keratoses     Actinic keratosis     Generalized anxiety disorder     Schizoaffective disorder, chronic condition (H)     Chronic hepatitis C without hepatic coma (H)     Past Surgical History:   Procedure Laterality Date     HEMORRHOIDECTOMY       HYSTERECTOMY, PAP NO LONGER INDICATED       ORTHOPEDIC SURGERY      knee replacement 10/11     SURGICAL HISTORY OF -       vaginal repair, Dr. Mcmillan       Social History     Tobacco Use     Smoking status: Former Smoker     Types: Cigarettes     Quit date: 3/30/1960     Years since quittin.1     Smokeless tobacco: Never Used     Tobacco comment: Smoked cigarettes for one year when she was 22 years old.   Substance Use Topics     Alcohol use: Yes     Comment: 1 GLASS OF WINE EVERY 1-2 WEEKS     Family History   Problem Relation Age of Onset     Cancer Father         LUNG- SMOKER     Cancer Brother         kidney     Diabetes No family hx of      Hypertension No family hx of          Current Outpatient Medications   Medication Sig Dispense Refill     AMITRIPTYLINE HCL 50 MG OR TABS 3 TABLETS AT BEDTIME       butalbital-aspirin-caffeine (FIORINAL) capsule Take 1 capsule by mouth every 4 hours as needed for pain. 30 capsule 0     CALCIUM + D OR 1 CAPSULE DAILY       carbidopa-levodopa (SINEMET CR)  MG CR tablet Take 1 tablet by mouth 3 times daily  3     cycloSPORINE (RESTASIS) 0.05 % ophthalmic emulsion Place 1 drop into both eyes 2 times daily For dry eyes       famotidine (PEPCID) 20 MG tablet Take 1 tablet (20 mg) by mouth 2 times daily 180 tablet 3     FIBER COMPLETE TABS Take 1 tablet by mouth daily       FLUoxetine (PROZAC) 10 MG tablet Take 10 mg by mouth daily To take with 20mg daily        "FLUoxetine (PROZAC) 20 MG capsule Take 1 capsule (20 mg) by mouth daily Prescribed by psychiatrist 1 capsule 0     MELATONIN PO        oxybutynin ER (DITROPAN-XL) 10 MG 24 hr tablet TAKE 1 TABLET (10 MG) BY MOUTH DAILY 90 tablet 3     propranolol (INDERAL) 10 MG tablet Take 1 tablet (10 mg) by mouth 2 times daily Prescribed by Neurologist       RISPERDAL 2 MG OR TABS 1 TABLET AT BEDTIME       UNABLE TO FIND MEDICATION NAME: Sod. hyalurante prednisone 0.001-25% eye drops for dry eyes       UNABLE TO FIND MEDICATION NAME: [employer] stool softner oral       butalbital-acetaminophen-caffeine (FIORICET/ESGIC) -40 MG tablet Take 1-2 tablets by mouth 2 times daily as needed for headaches Must last 30 days  0     Allergies   Allergen Reactions     Baclofen      Lisinopril Cough     Morphine Sulfate Rash     Valium Rash       ROS:  Constitutional, HEENT, cardiovascular, pulmonary, GI, , musculoskeletal, neuro, skin, endocrine and psych systems are negative, except as otherwise noted.    OBJECTIVE:   /60 (BP Location: Right arm)   Pulse 92   Temp 97.6  F (36.4  C) (Oral)   Resp 22   Ht 1.6 m (5' 3\")   Wt 61.1 kg (134 lb 9.6 oz)   SpO2 97%   BMI 23.84 kg/m   Estimated body mass index is 23.84 kg/m  as calculated from the following:    Height as of this encounter: 1.6 m (5' 3\").    Weight as of this encounter: 61.1 kg (134 lb 9.6 oz).  EXAM:   GENERAL: healthy, alert and no distress  EYES: Eyes grossly normal to inspection, PERRL and conjunctivae and sclerae normal  HENT: ear canals and TM's normal, nose and mouth without ulcers or lesions  NECK: no adenopathy, no asymmetry, masses, or scars and thyroid normal to palpation  RESP: lungs clear to auscultation - no rales, rhonchi or wheezes  BREAST: normal without masses, tenderness or nipple discharge and no palpable axillary masses or adenopathy  CV: regular rate and rhythm, normal S1 S2, no S3 or S4, no murmur, click or rub, no peripheral edema and " peripheral pulses strong  ABDOMEN: soft, nontender, no hepatosplenomegaly, no masses and bowel sounds normal  MS: no gross musculoskeletal defects noted, no edema  SKIN: no suspicious lesions or rashes  NEURO: Normal strength and tone, mentation intact and speech normal  PSYCH: mentation appears normal, affect normal/bright    Diagnostic Test Results:  Labs reviewed in Epic  Results for orders placed or performed in visit on 05/11/21   Hepatitis C Screen Reflex to HCV RNA Quant and Genotype     Status: Abnormal   Result Value Ref Range    Hepatitis C Antibody Reactive (A) NR^Nonreactive   Comprehensive metabolic panel (BMP + Alb, Alk Phos, ALT, AST, Total. Bili, TP)     Status: Abnormal   Result Value Ref Range    Sodium 139 133 - 144 mmol/L    Potassium 4.2 3.4 - 5.3 mmol/L    Chloride 106 94 - 109 mmol/L    Carbon Dioxide 28 20 - 32 mmol/L    Anion Gap 5 3 - 14 mmol/L    Glucose 94 70 - 99 mg/dL    Urea Nitrogen 23 7 - 30 mg/dL    Creatinine 0.86 0.52 - 1.04 mg/dL    GFR Estimate 65 >60 mL/min/[1.73_m2]    GFR Estimate If Black 75 >60 mL/min/[1.73_m2]    Calcium 8.9 8.5 - 10.1 mg/dL    Bilirubin Total 0.3 0.2 - 1.3 mg/dL    Albumin 3.6 3.4 - 5.0 g/dL    Protein Total 6.6 (L) 6.8 - 8.8 g/dL    Alkaline Phosphatase 86 40 - 150 U/L    ALT 10 0 - 50 U/L    AST 21 0 - 45 U/L   Lipid panel reflex to direct LDL Non-fasting     Status: None   Result Value Ref Range    Cholesterol 185 <200 mg/dL    Triglycerides 63 <150 mg/dL    HDL Cholesterol 73 >49 mg/dL    LDL Cholesterol Calculated 99 <100 mg/dL    Non HDL Cholesterol 112 <130 mg/dL   CBC with platelets     Status: None   Result Value Ref Range    WBC 9.4 4.0 - 11.0 10e9/L    RBC Count 4.13 3.8 - 5.2 10e12/L    Hemoglobin 12.9 11.7 - 15.7 g/dL    Hematocrit 40.4 35.0 - 47.0 %    MCV 98 78 - 100 fl    MCH 31.2 26.5 - 33.0 pg    MCHC 31.9 31.5 - 36.5 g/dL    RDW 13.9 10.0 - 15.0 %    Platelet Count 197 150 - 450 10e9/L   Hepatitis C RNA Quantitative     Status: Abnormal    Result Value Ref Range    HCV RNA Quant IU/ml 162,649 (A) HCVND^HCV RNA Not Detected [IU]/mL    Log of HCV RNA Qt 5.2 (H) <1.2 Log IU/mL   Hepatitis C High Resolution Genotype     Status: None   Result Value Ref Range    Hepatitis C High Resolution 1b        ASSESSMENT / PLAN:   1. Encounter for Medicare annual wellness exam    - EKG 12-lead complete w/read - Clinics  - CBC with platelets      2. Need for hepatitis C screening test  Positive screening- will refer to Hepatology  - Hepatitis C Screen Reflex to HCV RNA Quant and Genotype  - Hepatitis C RNA Quantitative  - Hepatitis C High Resolution Genotype    3. Screening for hyperlipidemia  - Lipid panel reflex to direct LDL Non-fasting    4. Parkinson's disease (H)  Chronic, stable, on drug therapy and she will continue to follow with Neurology    5. Migraine without status migrainosus, not intractable, unspecified migraine type  Chronic, stable, on drug therapy and she will continue to follow with Neurology    6. Ischemic colitis (H)  History of, no current symptoms    7. Gastroesophageal reflux disease without esophagitis  Chronic, stable, continue current treatment.   - famotidine (PEPCID) 20 MG tablet; Take 1 tablet (20 mg) by mouth 2 times daily  Dispense: 180 tablet; Refill: 3    8. Hypertension goal BP (blood pressure) < 140/90  Chronic, stable, continue current treatment.   - Comprehensive metabolic panel (BMP + Alb, Alk Phos, ALT, AST, Total. Bili, TP)    9. Urgency incontinence  Chronic, stable, continue current treatment.  Discussed this medication is on the Beer's list but patient declines stopping, is tolerating, and would like to continue  - oxybutynin ER (DITROPAN-XL) 10 MG 24 hr tablet; TAKE 1 TABLET (10 MG) BY MOUTH DAILY  Dispense: 90 tablet; Refill: 3    10. Overactive bladder  As above  - oxybutynin ER (DITROPAN-XL) 10 MG 24 hr tablet; TAKE 1 TABLET (10 MG) BY MOUTH DAILY  Dispense: 90 tablet; Refill: 3    Patient has been advised of split  "billing requirements and indicates understanding: provider did not discuss with patient    COUNSELING:  Reviewed preventive health counseling, as reflected in patient instructions       Regular exercise       Healthy diet/nutrition       Hepatitis C screening    Estimated body mass index is 23.84 kg/m  as calculated from the following:    Height as of this encounter: 1.6 m (5' 3\").    Weight as of this encounter: 61.1 kg (134 lb 9.6 oz).        She reports that she quit smoking about 61 years ago. Her smoking use included cigarettes. She has never used smokeless tobacco.    Appropriate preventive services were discussed with this patient, including applicable screening as appropriate for cardiovascular disease, diabetes, osteopenia/osteoporosis, and glaucoma.  As appropriate for age/gender, discussed screening for colorectal cancer, prostate cancer, breast cancer, and cervical cancer. Checklist reviewing preventive services available has been given to the patient.    Reviewed patients plan of care and provided an AVS. The Intermediate Care Plan ( asthma action plan, low back pain action plan, and migraine action plan) for Laurie meets the Care Plan requirement. This Care Plan has been established and reviewed with the Patient.    Counseling Resources:  ATP IV Guidelines  Pooled Cohorts Equation Calculator  Breast Cancer Risk Calculator  BRCA-Related Cancer Risk Assessment: FHS-7 Tool  FRAX Risk Assessment  ICSI Preventive Guidelines  Dietary Guidelines for Americans, 2010  [employer]'s MyPlate  ASA Prophylaxis  Lung CA Screening    Sunita Brito NP  [facility]  "

## 2021-05-11 NOTE — PATIENT INSTRUCTIONS
Patient Education   Personalized Prevention Plan  You are due for the preventive services outlined below.  Your care team is available to assist you in scheduling these services.  If you have already completed any of these items, please share that information with your care team to update in your medical record.  Health Maintenance Due   Topic Date Due     ANNUAL REVIEW OF HM ORDERS  Never done     Hepatitis C Screening  Never done     Zoster (Shingles) Vaccine (2 of 3) 06/09/2009     Depression Assessment  02/17/2019     Basic Metabolic Panel  01/25/2020     Annual Wellness Visit  11/25/2020     Comprehensive Metabolic Panel  11/25/2020     Cholesterol Lab  11/25/2020     FALL RISK ASSESSMENT  11/25/2020     Complete Blood Count  11/25/2020        At your visit today, we discussed your risk for falls and preventive options.    Fall Prevention  Falls often occur due to slipping, tripping or losing your balance. Millions of people fall every year and injure themselves. Here are ways to reduce your risk of falling again.     Think about your fall, was there anything that caused your fall that can be fixed, removed, or replaced?    Make your home safe by keeping walkways clear of objects you may trip over, such as electric cords.    Use non-slip pads under rugs. Don't use area rugs or small throw rugs.    Use non-slip mats in bathtubs and showers.    Install handrails and lights on staircases. The handrails should be on both sides of the stairs.    Don't walk in poorly lit areas.    Don't stand on chairs or wobbly ladders.    Use caution when reaching overhead or looking upward. This position can cause a loss of balance.    Be sure your shoes fit properly, have non-slip bottoms and are in good condition.     Wear shoes both inside and out. Don't go barefoot or wear slippers.    Be cautious when going up and down stairs, curbs, and when walking on uneven sidewalks.    If your balance is poor, consider using a cane or  walker.    If your fall was related to alcohol use, stop or limit alcohol intake.     If your fall was related to use of sleeping medicines, talk to your healthcare provider about this. You may need to reduce your dosage at bedtime if you awaken during the night to go to the bathroom.      To reduce the need for nighttime bathroom trips:  ? Don't drink fluids for several hours before going to bed  ? Empty your bladder before going to bed  ? Men can keep a urinal at the bedside    Stay as active as you can. Balance, flexibility, strength, and endurance all come from exercise. They all play a role in preventing falls. Ask your healthcare provider which types of activity are right for you.    Get your vision checked on a regular basis.    If you have pets, know where they are before you stand up or walk so you don't trip over them.    Use night lights.    Go over all your medicines with a pharmacist or other healthcare provider to see if any of them could make you more likely to fall.  [employer] last reviewed this educational content on 4/1/2018 2000-2021 [employer]. All rights reserved. This information is not intended as a substitute for professional medical care. Always follow your healthcare professional's instructions.

## 2021-05-12 LAB — HCV AB SERPL QL IA: REACTIVE

## 2021-05-13 LAB
HCV RNA SERPL NAA+PROBE-ACNC: ABNORMAL [IU]/ML
HCV RNA SERPL NAA+PROBE-LOG IU: 5.2 LOG IU/ML

## 2021-05-17 ENCOUNTER — TELEPHONE (OUTPATIENT)
Dept: FAMILY MEDICINE | Facility: CLINIC | Age: 81
End: 2021-05-17

## 2021-05-17 DIAGNOSIS — B18.2 CHRONIC HEPATITIS C WITHOUT HEPATIC COMA (H): Primary | ICD-10-CM

## 2021-05-21 LAB — HCV GENTYP SERPL NAA+PROBE: NORMAL

## 2021-05-21 NOTE — TELEPHONE ENCOUNTER
I called and left a voicemail for patient asking her to call back to discuss her lab results and recommendations.  Please attempt to call her again in a few days.    I also sent her a [employer] message with this as well.    Please let patient know that her hepatitis C screening came back positive.  I will want patient to follow up with a liver specialist to talk about hepatitis C and treatment options.  I placed a referral for her and they will be calling her.  I would recommend CONNIE Ponce through the [facility] in [place].  Please also make sure she has then number to call the [facility] location so she can schedule.    Sunita Brito, OC, APRN, CNP

## 2021-05-22 PROBLEM — B18.2 CHRONIC HEPATITIS C WITHOUT HEPATIC COMA (H): Status: ACTIVE | Noted: 2021-05-22

## 2021-05-24 ENCOUNTER — TELEPHONE (OUTPATIENT)
Dept: GASTROENTEROLOGY | Facility: CLINIC | Age: 81
End: 2021-05-24

## 2021-05-24 DIAGNOSIS — Z11.59 ENCOUNTER FOR SCREENING FOR OTHER VIRAL DISEASES: ICD-10-CM

## 2021-05-24 DIAGNOSIS — B18.2 CHRONIC HEPATITIS C WITHOUT HEPATIC COMA (H): Primary | ICD-10-CM

## 2021-05-24 NOTE — TELEPHONE ENCOUNTER
[facility] Call Center    Phone Message    May a detailed message be left on voicemail: yes     Reason for Call: Other: Laurie calling to request a call back. She would like someone to explain hepatitis with her. Please call Laurie at your earliest convenience to discuss.     Action Taken: Message routed to:  [facility] ([facility]):  HEPATOLOGY    Travel Screening: Not Applicable

## 2021-05-24 NOTE — TELEPHONE ENCOUNTER
Patient given information below, she will need some time to schedule for a ride through [email or web address], she should be ok to schedule within the next couple of weeks.  Patient should call today, [facility] number given, as unsure how long wait there will be.      Isa Berg RN

## 2021-05-25 NOTE — TELEPHONE ENCOUNTER
Returned patient's call.  Answered some general questions about hepatitis C and let her know she can discuss the specifics in detail during her visit with CONNIE Ponce.     No further questions or concerns.    Brittny ANN LPN  Hepatology Clinic

## 2021-05-25 NOTE — TELEPHONE ENCOUNTER
RECORDS RECEIVED FROM: Internal   Appt Date: 06.02.2021   NOTES STATUS DETAILS   OFFICE NOTE from referring provider Internal 05.17.2021 Consult Sunita Brito NP   OFFICE NOTES from other specialists N/A    DISCHARGE SUMMARY from hospital N/A    MEDICATION LIST Internal    LIVER BIOSPY (IF APPLICABLE)      PATHOLOGY REPORTS  N/A    IMAGING     ENDOSCOPY (IF AVAILABLE) N/A    COLONOSCOPY (IF AVAILABLE) N/A    ULTRASOUND LIVER N/A    CT OF ABDOMEN N/A    MRI OF LIVER N/A    FIBROSCAN, US ELASTOGRAPHY, FIBROSIS SCAN, MR ELASTOGRAPHY N/A    LABS     HEPATIC PANEL (LIVER PANEL) Future 05.24.2021   BASIC METABOLIC PANEL Future 05.24.2021   COMPLETE METABOLIC PANEL Internal 05.11.2021   COMPLETE BLOOD COUNT (CBC) Internal 05.11.2021   INTERNATIONAL NORMALIZED RATIO (INR) N/A    HEPATITIS C ANTIBODY N/A    HEPATITIS C VIRAL LOAD/PCR N/A    HEPATITIS C GENOTYPE N/A    HEPATITIS B SURFACE ANTIGEN Future 05.24.2021   HEPATITIS B SURFACE ANTIBODY Future 05.24.2021   HEPATITIS B DNA QUANT LEVEL N/A    HEPATITIS B CORE ANTIBODY Future 05.24.2021

## 2021-05-27 DIAGNOSIS — Z11.59 ENCOUNTER FOR SCREENING FOR OTHER VIRAL DISEASES: ICD-10-CM

## 2021-05-27 DIAGNOSIS — B18.2 CHRONIC HEPATITIS C WITHOUT HEPATIC COMA (H): ICD-10-CM

## 2021-05-27 LAB
ERYTHROCYTE [DISTWIDTH] IN BLOOD BY AUTOMATED COUNT: 13.9 % (ref 10–15)
HCT VFR BLD AUTO: 39.8 % (ref 35–47)
HGB BLD-MCNC: 13 G/DL (ref 11.7–15.7)
MCH RBC QN AUTO: 31.3 PG (ref 26.5–33)
MCHC RBC AUTO-ENTMCNC: 32.7 G/DL (ref 31.5–36.5)
MCV RBC AUTO: 96 FL (ref 78–100)
PLATELET # BLD AUTO: 204 10E9/L (ref 150–450)
RBC # BLD AUTO: 4.16 10E12/L (ref 3.8–5.2)
WBC # BLD AUTO: 8.2 10E9/L (ref 4–11)

## 2021-05-27 PROCEDURE — 87389 HIV-1 AG W/HIV-1&-2 AB AG IA: CPT | Performed by: PHYSICIAN ASSISTANT

## 2021-05-27 PROCEDURE — 85610 PROTHROMBIN TIME: CPT | Performed by: PHYSICIAN ASSISTANT

## 2021-05-27 PROCEDURE — 86706 HEP B SURFACE ANTIBODY: CPT | Performed by: PHYSICIAN ASSISTANT

## 2021-05-27 PROCEDURE — 80076 HEPATIC FUNCTION PANEL: CPT | Performed by: PHYSICIAN ASSISTANT

## 2021-05-27 PROCEDURE — 87340 HEPATITIS B SURFACE AG IA: CPT | Performed by: PHYSICIAN ASSISTANT

## 2021-05-27 PROCEDURE — 86704 HEP B CORE ANTIBODY TOTAL: CPT | Performed by: PHYSICIAN ASSISTANT

## 2021-05-27 PROCEDURE — 36415 COLL VENOUS BLD VENIPUNCTURE: CPT | Performed by: PHYSICIAN ASSISTANT

## 2021-05-27 PROCEDURE — 80048 BASIC METABOLIC PNL TOTAL CA: CPT | Performed by: PHYSICIAN ASSISTANT

## 2021-05-27 PROCEDURE — 85027 COMPLETE CBC AUTOMATED: CPT | Performed by: PHYSICIAN ASSISTANT

## 2021-05-28 LAB
ALBUMIN SERPL-MCNC: 3.6 G/DL (ref 3.4–5)
ALP SERPL-CCNC: 98 U/L (ref 40–150)
ALT SERPL W P-5'-P-CCNC: 7 U/L (ref 0–50)
ANION GAP SERPL CALCULATED.3IONS-SCNC: 8 MMOL/L (ref 3–14)
AST SERPL W P-5'-P-CCNC: 20 U/L (ref 0–45)
BILIRUB DIRECT SERPL-MCNC: 0.1 MG/DL (ref 0–0.2)
BILIRUB SERPL-MCNC: 0.4 MG/DL (ref 0.2–1.3)
BUN SERPL-MCNC: 21 MG/DL (ref 7–30)
CALCIUM SERPL-MCNC: 9 MG/DL (ref 8.5–10.1)
CHLORIDE SERPL-SCNC: 105 MMOL/L (ref 94–109)
CO2 SERPL-SCNC: 28 MMOL/L (ref 20–32)
CREAT SERPL-MCNC: 0.78 MG/DL (ref 0.52–1.04)
GFR SERPL CREATININE-BSD FRML MDRD: 72 ML/MIN/{1.73_M2}
GLUCOSE SERPL-MCNC: 82 MG/DL (ref 70–99)
INR PPP: 0.98 (ref 0.86–1.14)
POTASSIUM SERPL-SCNC: 4.2 MMOL/L (ref 3.4–5.3)
PROT SERPL-MCNC: 6.8 G/DL (ref 6.8–8.8)
SODIUM SERPL-SCNC: 141 MMOL/L (ref 133–144)

## 2021-05-29 LAB
HBV SURFACE AB SERPL IA-ACNC: 0 M[IU]/ML
HBV SURFACE AG SERPL QL IA: NONREACTIVE
HIV 1+2 AB+HIV1 P24 AG SERPL QL IA: NONREACTIVE

## 2021-05-30 LAB — HBV CORE AB SERPL QL IA: NONREACTIVE

## 2021-06-02 ENCOUNTER — VIRTUAL VISIT (OUTPATIENT)
Dept: GASTROENTEROLOGY | Facility: CLINIC | Age: 81
End: 2021-06-02
Attending: NURSE PRACTITIONER
Payer: COMMERCIAL

## 2021-06-02 ENCOUNTER — PRE VISIT (OUTPATIENT)
Dept: GASTROENTEROLOGY | Facility: CLINIC | Age: 81
End: 2021-06-02

## 2021-06-02 DIAGNOSIS — B18.2 CHRONIC HEPATITIS C WITHOUT HEPATIC COMA (H): ICD-10-CM

## 2021-06-02 PROCEDURE — 99443 PR PHYSICIAN TELEPHONE EVALUATION 21-30 MIN: CPT | Mod: 95 | Performed by: PHYSICIAN ASSISTANT

## 2021-06-02 ASSESSMENT — PAIN SCALES - GENERAL: PAINLEVEL: NO PAIN (0)

## 2021-06-02 NOTE — LETTER
6/2/2021         RE: Laurie Orosco  [address] [facility]  [address] [place] [address]        Dear Colleague,    Thank you for referring your patient, Laurie Orosco, to the [facility] [place]. Please see a copy of my visit note below.    Laurie is a 80 year old who is being evaluated via a billable telephone visit.      What phone number would you like to be contacted at? [phone]  How would you like to obtain your AVS? MyChart  Phone call duration: 31 minutes      Hepatology Clinic Note  Laurie Orosco   Date of Birth 1940  Date of Service 6/2/2021    REASON FOR CONSULTATION: Hepatitis C  REFERRING PROVIDER: Sunita Brito NP          Assessment/plan:   Laurie Orosco is a 80 year old female with Hepatitis C, genotype 1b, treatment naive. Currently there are no biochemical or physical signs of cirrhosis, but given possibility of long term Hepatitis C, recommend that we evaluate fibrosis to determine need for HCC screening in the future.     We discussed the natural course of Hepatitis C virus and the benefits of treating the disease. We discussed the treatment regimen and medication side effects. Patient would be an excellent candidate for Hepatitis C treatment to prevent worsening fibrosis and to prevent extrahepatic manifestations of the disease.     No contraindications from a Hepatology standpoint from moving forward with knee replacement surgery .    - US elastography evaluate for any degree of fibrosis   - Will plan to send prescription for Hepatitis C pending US elastography and fibrosis  - Repeat HCV RNA at the end of treatment and 12-weeks after finishing treatment to determine SVR  - If patient achieves SVR and Fibrosis Stage 2 or less, she does not need regular follow-up in Hepatology Clinic.   - Follow-up in Hepatology Clinic as needed    Carlos Montejo PA-C   [facility]  Hepatology    -----------------------------------------------------       HPI:   Laurie Orosco is a 80 year old female  presenting for evaluation and treatment of Hepatitis C. Her son Shawn joins her on the call today.     Hepatitis C   -Genotype pending  -Diagnosed: May 2021  -History: Blood transfusion   -Prior biopsy: no   -Prior treatments: Naive     Patient was recently diagnosed with Hep C screening. She is not sure how she may have acquired the virus. She did have a blood transfusion in the late 1980's. No previous problems with liver.     Appetite is minimal currently. Weight is down about 5 lbs. No fluid in legs. May have a bowel movement every 3 days. Takes fiber pills to help have regular bowel movements.     Patient denies jaundice, lower extremity edema, abdominal distension or confusion.  Patient also denies melena, hematochezia or hematemesis. Patient denies fevers, sweats or chills.    PMH: Parkinson's, GERD, depression, Schizoaffective Disorder, Hx of PE,    SMH: Right knee replacement. Hysterectomy, hemorrhoidectomy     Medications: See below     No previous tobacco use. May have glass of wine on occassion. No history of signifcant alcohol. Blood transfusion at the time of hysterectomy many years ago, likely late 1980's. No previous IV/IN drug use. Live at home by herself and manages her own medications. Dad was an alcoholic, but no known family history of liver disease or liver cancer.   in memory care unit.     Lab work-up thus far:   HCV RNA: 162,649  HBV SAb 0.00- NOT IMMUNE  HBV SAg: nonreactive   HBV CAb nonreactive  HIV nonreactive    Medical hx Surgical hx   Past Medical History:   Diagnosis Date     Chronic hepatitis C without hepatic coma (H) 5/22/2021     Depression, major      DVT (deep venous thrombosis) (H)      GERD (gastroesophageal reflux disease)      Left knee DJD      Migraine      PE (pulmonary embolism)      Vaginal prolapse     Past Surgical History:   Procedure  Laterality Date     HEMORRHOIDECTOMY       HYSTERECTOMY, PAP NO LONGER INDICATED       ORTHOPEDIC SURGERY      knee replacement 10/11     SURGICAL HISTORY OF -       vaginal repair, Dr. Mcmillan                 Medications:     Current Outpatient Medications   Medication     AMITRIPTYLINE HCL 50 MG OR TABS     butalbital-acetaminophen-caffeine (FIORICET/ESGIC) -40 MG tablet     butalbital-aspirin-caffeine (FIORINAL) capsule     CALCIUM + D OR     carbidopa-levodopa (SINEMET CR)  MG CR tablet     cycloSPORINE (RESTASIS) 0.05 % ophthalmic emulsion     famotidine (PEPCID) 20 MG tablet     FIBER COMPLETE TABS     FLUoxetine (PROZAC) 10 MG tablet     FLUoxetine (PROZAC) 20 MG capsule     MELATONIN PO     oxybutynin ER (DITROPAN-XL) 10 MG 24 hr tablet     propranolol (INDERAL) 10 MG tablet     RISPERDAL 2 MG OR TABS     UNABLE TO FIND     UNABLE TO FIND     No current facility-administered medications for this visit.             Allergies:     Allergies   Allergen Reactions     Baclofen      Lisinopril Cough     Morphine Sulfate Rash     Valium Rash            Social History:     Social History     Socioeconomic History     Marital status:      Spouse name: ANDREA RAMIREZ     Number of children: 2     Years of education: Not on file     Highest education level: Not on file   Occupational History     Employer: RETIRED   Social Needs     Financial resource strain: Not on file     Food insecurity     Worry: Not on file     Inability: Not on file     Transportation needs     Medical: Not on file     Non-medical: Not on file   Tobacco Use     Smoking status: Former Smoker     Types: Cigarettes     Quit date: 3/30/1960     Years since quittin.2     Smokeless tobacco: Never Used     Tobacco comment: Smoked cigarettes for one year when she was 22 years old.   Substance and Sexual Activity     Alcohol use: Yes     Comment: 1 GLASS OF WINE EVERY 1-2 WEEKS     Drug use: No     Sexual activity: Not Currently    Lifestyle     Physical activity     Days per week: Not on file     Minutes per session: Not on file     Stress: Not on file   Relationships     Social connections     Talks on phone: Not on file     Gets together: Not on file     Attends Voodoo service: Not on file     Active member of club or organization: Not on file     Attends meetings of clubs or organizations: Not on file     Relationship status: Not on file     Intimate partner violence     Fear of current or ex partner: Not on file     Emotionally abused: Not on file     Physically abused: Not on file     Forced sexual activity: Not on file   Other Topics Concern     Parent/sibling w/ CABG, MI or angioplasty before 65F 55M? No   Social History Narrative     Not on file            Family History:     Family History   Problem Relation Age of Onset     Cancer Father         LUNG- SMOKER     Cancer Brother         kidney     Diabetes No family hx of      Hypertension No family hx of             Review of Systems:   GEN: See HPI  HEENT: No change in vision or hearing, mouth sores, dysphagia, lymph nodes  Resp: No shortness of breath, coughing, hx of asthma  CV: No chest pain, palpitations, syncope   GI: See HPI  : No dysuria, history of stones, urine color    Skin: No rash; no pruritus or psoriasis  MS: No arthralgias, myalgias, joint swelling  Neuro: No memory changes, confusion, numbness    Heme: No difficulty clotting, bruising, bleeding  Psych:  No anxiety, depression, agitation          Physical Exam:     PSYCH: Alert and oriented times 3; coherent speech, normal   rate and volume, able to articulate logical thoughts, able   to abstract reason, no tangential thoughts, no hallucinations   or delusions  His affect is normal  RESP: No cough, no audible wheezing, able to talk in full sentences  Remainder of exam unable to be completed due to telephone visits         Data:   Reviewed in person and significant for:    Lab Results   Component Value Date      05/27/2021      Lab Results   Component Value Date    POTASSIUM 4.2 05/27/2021     Lab Results   Component Value Date    CHLORIDE 105 05/27/2021     Lab Results   Component Value Date    CO2 28 05/27/2021     Lab Results   Component Value Date    BUN 21 05/27/2021     Lab Results   Component Value Date    CR 0.78 05/27/2021       Lab Results   Component Value Date    WBC 8.2 05/27/2021     Lab Results   Component Value Date    HGB 13.0 05/27/2021     Lab Results   Component Value Date    HCT 39.8 05/27/2021     Lab Results   Component Value Date    MCV 96 05/27/2021     Lab Results   Component Value Date     05/27/2021       Lab Results   Component Value Date    AST 20 05/27/2021     Lab Results   Component Value Date    ALT 7 05/27/2021     No results found for: BILICONJ   Lab Results   Component Value Date    BILITOTAL 0.4 05/27/2021       Lab Results   Component Value Date    ALBUMIN 3.6 05/27/2021     Lab Results   Component Value Date    PROTTOTAL 6.8 05/27/2021      Lab Results   Component Value Date    ALKPHOS 98 05/27/2021       Lab Results   Component Value Date    INR 0.98 05/27/2021     No recent abdominal imaging    MR ABDOIMEN   11/14/2011: IMPRESSION: 2.8 cm hemangioma within the right lobe of the liver at  the dome. This corresponds to the described location of the indeterminate lesion on the report from the comparison ultrasound   dated 10/9/2011.        Again, thank you for allowing me to participate in the care of your patient.        Sincerely,        Carlos Montejo PA-C

## 2021-06-04 NOTE — PROGRESS NOTES
Laurie is a 80 year old who is being evaluated via a billable telephone visit.      What phone number would you like to be contacted at? [phone]  How would you like to obtain your AVS? Antoninarosa m  Phone call duration: 31 minutes      Hepatology Clinic Note  Laurie Orosco   Date of Birth 1940  Date of Service 6/2/2021    REASON FOR CONSULTATION: Hepatitis C  REFERRING PROVIDER: Sunita Brito NP          Assessment/plan:   Laurie Orosco is a 80 year old female with Hepatitis C, genotype 1b, treatment naive. Currently there are no biochemical or physical signs of cirrhosis, but given possibility of long term Hepatitis C, recommend that we evaluate fibrosis to determine need for HCC screening in the future.     We discussed the natural course of Hepatitis C virus and the benefits of treating the disease. We discussed the treatment regimen and medication side effects. Patient would be an excellent candidate for Hepatitis C treatment to prevent worsening fibrosis and to prevent extrahepatic manifestations of the disease.     No contraindications from a Hepatology standpoint from moving forward with knee replacement surgery .    - US elastography evaluate for any degree of fibrosis   - Will plan to send prescription for Hepatitis C pending US elastography and fibrosis  - Repeat HCV RNA at the end of treatment and 12-weeks after finishing treatment to determine SVR  - If patient achieves SVR and Fibrosis Stage 2 or less, she does not need regular follow-up in Hepatology Clinic.   - Follow-up in Hepatology Clinic as needed    Carlos Montejo PA-C   [facility] Hepatology    -----------------------------------------------------       HPI:   Laurie Orosco is a 80 year old female  presenting for evaluation and treatment of Hepatitis C. Her son Shawn joins her on the call today.     Hepatitis C   -Genotype pending  -Diagnosed: May 2021  -History: Blood transfusion   -Prior biopsy: no   -Prior  treatments: Naive     Patient was recently diagnosed with Hep C screening. She is not sure how she may have acquired the virus. She did have a blood transfusion in the late 1980's. No previous problems with liver.     Appetite is minimal currently. Weight is down about 5 lbs. No fluid in legs. May have a bowel movement every 3 days. Takes fiber pills to help have regular bowel movements.     Patient denies jaundice, lower extremity edema, abdominal distension or confusion.  Patient also denies melena, hematochezia or hematemesis. Patient denies fevers, sweats or chills.    PMH: Parkinson's, GERD, depression, Schizoaffective Disorder, Hx of PE,    SMH: Right knee replacement. Hysterectomy, hemorrhoidectomy     Medications: See below     No previous tobacco use. May have glass of wine on occassion. No history of signifcant alcohol. Blood transfusion at the time of hysterectomy many years ago, likely late 1980's. No previous IV/IN drug use. Live at home by herself and manages her own medications. Dad was an alcoholic, but no known family history of liver disease or liver cancer.   in memory care unit.     Lab work-up thus far:   HCV RNA: 162,649  HBV SAb 0.00- NOT IMMUNE  HBV SAg: nonreactive   HBV CAb nonreactive  HIV nonreactive    Medical hx Surgical hx   Past Medical History:   Diagnosis Date     Chronic hepatitis C without hepatic coma (H) 5/22/2021     Depression, major      DVT (deep venous thrombosis) (H)      GERD (gastroesophageal reflux disease)      Left knee DJD      Migraine      PE (pulmonary embolism)      Vaginal prolapse     Past Surgical History:   Procedure Laterality Date     HEMORRHOIDECTOMY       HYSTERECTOMY, PAP NO LONGER INDICATED       ORTHOPEDIC SURGERY      knee replacement 10/11     SURGICAL HISTORY OF -       vaginal repair, Dr. Mcmillan                 Medications:     Current Outpatient Medications   Medication     AMITRIPTYLINE HCL 50 MG OR TABS      butalbital-acetaminophen-caffeine (FIORICET/ESGIC) -40 MG tablet     butalbital-aspirin-caffeine (FIORINAL) capsule     CALCIUM + D OR     carbidopa-levodopa (SINEMET CR)  MG CR tablet     cycloSPORINE (RESTASIS) 0.05 % ophthalmic emulsion     famotidine (PEPCID) 20 MG tablet     FIBER COMPLETE TABS     FLUoxetine (PROZAC) 10 MG tablet     FLUoxetine (PROZAC) 20 MG capsule     MELATONIN PO     oxybutynin ER (DITROPAN-XL) 10 MG 24 hr tablet     propranolol (INDERAL) 10 MG tablet     RISPERDAL 2 MG OR TABS     UNABLE TO FIND     UNABLE TO FIND     No current facility-administered medications for this visit.             Allergies:     Allergies   Allergen Reactions     Baclofen      Lisinopril Cough     Morphine Sulfate Rash     Valium Rash            Social History:     Social History     Socioeconomic History     Marital status:      Spouse name: ANDREA RAMIREZ     Number of children: 2     Years of education: Not on file     Highest education level: Not on file   Occupational History     Employer: RETIRED   Social Needs     Financial resource strain: Not on file     Food insecurity     Worry: Not on file     Inability: Not on file     Transportation needs     Medical: Not on file     Non-medical: Not on file   Tobacco Use     Smoking status: Former Smoker     Types: Cigarettes     Quit date: 3/30/1960     Years since quittin.2     Smokeless tobacco: Never Used     Tobacco comment: Smoked cigarettes for one year when she was 22 years old.   Substance and Sexual Activity     Alcohol use: Yes     Comment: 1 GLASS OF WINE EVERY 1-2 WEEKS     Drug use: No     Sexual activity: Not Currently   Lifestyle     Physical activity     Days per week: Not on file     Minutes per session: Not on file     Stress: Not on file   Relationships     Social connections     Talks on phone: Not on file     Gets together: Not on file     Attends Mandaen service: Not on file     Active member of club or organization:  Not on file     Attends meetings of clubs or organizations: Not on file     Relationship status: Not on file     Intimate partner violence     Fear of current or ex partner: Not on file     Emotionally abused: Not on file     Physically abused: Not on file     Forced sexual activity: Not on file   Other Topics Concern     Parent/sibling w/ CABG, MI or angioplasty before 65F 55M? No   Social History Narrative     Not on file            Family History:     Family History   Problem Relation Age of Onset     Cancer Father         LUNG- SMOKER     Cancer Brother         kidney     Diabetes No family hx of      Hypertension No family hx of             Review of Systems:   GEN: See HPI  HEENT: No change in vision or hearing, mouth sores, dysphagia, lymph nodes  Resp: No shortness of breath, coughing, hx of asthma  CV: No chest pain, palpitations, syncope   GI: See HPI  : No dysuria, history of stones, urine color    Skin: No rash; no pruritus or psoriasis  MS: No arthralgias, myalgias, joint swelling  Neuro: No memory changes, confusion, numbness    Heme: No difficulty clotting, bruising, bleeding  Psych:  No anxiety, depression, agitation          Physical Exam:     PSYCH: Alert and oriented times 3; coherent speech, normal   rate and volume, able to articulate logical thoughts, able   to abstract reason, no tangential thoughts, no hallucinations   or delusions  His affect is normal  RESP: No cough, no audible wheezing, able to talk in full sentences  Remainder of exam unable to be completed due to telephone visits         Data:   Reviewed in person and significant for:    Lab Results   Component Value Date     05/27/2021      Lab Results   Component Value Date    POTASSIUM 4.2 05/27/2021     Lab Results   Component Value Date    CHLORIDE 105 05/27/2021     Lab Results   Component Value Date    CO2 28 05/27/2021     Lab Results   Component Value Date    BUN 21 05/27/2021     Lab Results   Component Value Date     CR 0.78 05/27/2021       Lab Results   Component Value Date    WBC 8.2 05/27/2021     Lab Results   Component Value Date    HGB 13.0 05/27/2021     Lab Results   Component Value Date    HCT 39.8 05/27/2021     Lab Results   Component Value Date    MCV 96 05/27/2021     Lab Results   Component Value Date     05/27/2021       Lab Results   Component Value Date    AST 20 05/27/2021     Lab Results   Component Value Date    ALT 7 05/27/2021     No results found for: BILICONJ   Lab Results   Component Value Date    BILITOTAL 0.4 05/27/2021       Lab Results   Component Value Date    ALBUMIN 3.6 05/27/2021     Lab Results   Component Value Date    PROTTOTAL 6.8 05/27/2021      Lab Results   Component Value Date    ALKPHOS 98 05/27/2021       Lab Results   Component Value Date    INR 0.98 05/27/2021     No recent abdominal imaging    MR JANELLE   11/14/2011: IMPRESSION: 2.8 cm hemangioma within the right lobe of the liver at  the dome. This corresponds to the described location of the indeterminate lesion on the report from the comparison ultrasound   dated 10/9/2011.

## 2021-06-16 ENCOUNTER — ANCILLARY PROCEDURE (OUTPATIENT)
Dept: ULTRASOUND IMAGING | Facility: CLINIC | Age: 81
End: 2021-06-16
Attending: PHYSICIAN ASSISTANT
Payer: COMMERCIAL

## 2021-06-16 DIAGNOSIS — B18.2 CHRONIC HEPATITIS C WITHOUT HEPATIC COMA (H): ICD-10-CM

## 2021-06-16 PROCEDURE — 76981 USE PARENCHYMA: CPT | Performed by: RADIOLOGY

## 2021-06-16 NOTE — LETTER
June 17, 2021       TO: Laurie LANDIS Kwesi  [address] [facility]  [address] [place] [address] Laurie,     No signs of any advanced scarring in your liver. No concerning liver masses. You have a mass in your liver that is called a hemangioma. This has been there for a long time. Nothing needs to be done about it.     Prescription for Hep C has been sent!     - The medication has to go through prior-authorization process to get approved with your insurance. This may take up to 4 weeks.     - Once your prescription for Hep C is approved, [facility] will contact you for delivery. Your insurance may prefer your medication is sent to a different pharmacy. You will be notified of this change.     - [facility] will need to speak to you patient or primary caregiver prior to delivery. Please notify the clinic of any changes in contact information (phone number).     It was a pleasure to see you at your recent visit. Please let me know if you have any questions or concerns.     Clinic Staff - [phone] option 3     Sincerely,     Carlos Montejo PA-C   [address] [facility], Mail [id number]  [place]  [address].

## 2021-06-22 ENCOUNTER — TELEPHONE (OUTPATIENT)
Dept: GASTROENTEROLOGY | Facility: CLINIC | Age: 81
End: 2021-06-22

## 2021-06-22 NOTE — TELEPHONE ENCOUNTER
PA Initiation    Medication: Mavyret  Insurance Company: [employer] - Phone [phone] Fax [phone]  Pharmacy Filling the Rx: [facility] - [place], [place] - [facility] [address]  Filling Pharmacy Phone: [phone]  Filling Pharmacy Fax: [phone]  Start Date: 6/21/2021

## 2021-06-23 ENCOUNTER — TELEPHONE (OUTPATIENT)
Dept: GASTROENTEROLOGY | Facility: CLINIC | Age: 81
End: 2021-06-23

## 2021-06-23 ENCOUNTER — TRANSFERRED RECORDS (OUTPATIENT)
Dept: HEALTH INFORMATION MANAGEMENT | Facility: CLINIC | Age: 81
End: 2021-06-23

## 2021-06-23 NOTE — TELEPHONE ENCOUNTER
Returned Mrs. Orosco's call. Discussed elastography results.  Letter was also sent.  All questions answered.      Brittny ANN LPN  Hepatology Clinic       [facility]    Phone Message    May a detailed message be left on voicemail: yes     Reason for Call: Patient had ultra sound done on 6/16/21.  Patient would like to speak with someone in regards to the results of that.  Please reach out to patient.  She is out today after 1:00 p.m. but will be available tomorrow.    Action Taken: Message routed to:  [facility]ology    Travel Screening: Not Applicable

## 2021-06-23 NOTE — TELEPHONE ENCOUNTER
Patient will be having knee surgery and wants to know if she will need to wait until after hepatitis C treatment.    [facility]    Phone Message    May a detailed message be left on voicemail: yes     Reason for Call: Patient just spoke with Brittny.  Patient has some more questions. Please reach out to patient.    Action Taken: Message routed to:  [facility] Hepatology    Travel Screening: Not Applicable

## 2021-06-23 NOTE — TELEPHONE ENCOUNTER
Per Carlos Montejo, pt does not need to wait until after hepatitis C treatment to have knee surgery. Notified pt of this and pt stated that she would still like to wait until hepatitis C treatment is finished before having surgery. Reviewed hepatitis C treatment process and that pt will get a call from [facility] regarding delivery of medication. Pt gave writer verbal consent to review treatment plan with son, Shawn (phone: [phone]).

## 2021-06-24 NOTE — TELEPHONE ENCOUNTER
Prior Authorization Approval    Authorization Effective Date: 5/22/2021  Authorization Expiration Date: 8/16/2021  Medication: Mavyret  Approved Dose/Quantity: 8 weeks  Reference #:     Insurance Company: [employer] - Phone [phone] Fax [phone]  Expected CoPay: $2535.30     CoPay Card Available: No    Foundation Assistance Needed: No  Which Pharmacy is filling the prescription (Not needed for infusion/clinic administered): [facility] - [place], [place] - [address]  Pharmacy Notified: Yes  Patient Notified: Yes

## 2021-06-28 ENCOUNTER — CARE COORDINATION (OUTPATIENT)
Dept: GASTROENTEROLOGY | Facility: CLINIC | Age: 81
End: 2021-06-28

## 2021-06-28 DIAGNOSIS — B18.2 CHRONIC HEPATITIS C WITHOUT HEPATIC COMA (H): Primary | ICD-10-CM

## 2021-06-28 NOTE — LETTER
August 30, 2021       TO: Laurie Orosco  [address] [facility]  [address] [place] [id number]       Dear ,    We are writing to inform you of your test results.Your end of treatment labs show that the hepatitis C virus was not detected in your blood. This is great news but you will still need to get labs drawn on 11/22/21 to determine if you are cured of hepatitis C. The order for this lab in your chart and you can go to any [facility] clinic to have this drawn. Please call the hepatology clinic with any questions.         Thank you,  Tana SOLITARIO RN Care Coordinator  Clinic 3A  Hepatology  P: [phone]

## 2021-06-28 NOTE — PROGRESS NOTES
Connected with patient and pt's son, Shawn, for f/u on Hep C treatment delivery/start status. Patient received their Mavyret medication and are ready to start treatment. Patient will be on Hep C treatment for 8 weeks. Patient reports no recent changes in health, hospitalizations or recent changes in medications. Patient did discuss with a pharmacist. Reviewed the following Hep C POC and education with patient.     Hepatitis C Treatment  Treatment: Mavyret x 8 weeks  Genotype: 1b  Stage Fibrosis: F0  Previous Treatment Outcome: Naive    Please have labs drawn as close to the date indicated at an [facility].    Start Date: 06/29/21    Week 8-End of Treatment  HCV RNA Quant Lab due: 8/24/2021  Please have this lab drawn on or within a week after this date 8/24/2021. You will need to repeat this lab 3 months after completing treatment to ensure you have cleared the virus. Please see date for the final lab below. You do not need to fast for this lab.       3 Months Post Treatment  HCV RNA Quant Lab due: 11/22/2021  Please have this lab drawn on the specified date of 11/22/2021 or within a week after. This final lab will determine if you have cleared the Hepatitis C virus with Mavyret treatment. Without this final lab, we will be unable to determine if treatment was successful. You do not need to fast for this lab.     Educational information to patient on Hep C treatment;     -Contact the [facility] Hepatology clinic and speak with clinical RN prior to starting any new prescribed or OTC medications.   -Take medications exactly as prescribed, do not change dose or stop taking without consulting your provider.   -Take Medication one time each day with or without food  -If you miss a dose of medication, then take it as soon as you remember on the same day. If not remembered on the same day, then skip the dose and take the next dose at the usual time. Do not take more than the recommended dose. Contact the clinic if  you miss a dose.    Please contact the pharmacy 1-2 weeks prior to needing a medication refill.      Side Effects  The most common side effects of Hep C medication treatment can include:  -tiredness  -headache  -nausea  Notify the clinic of any side effects that bother you or that do not go away.   Possible side effects have been discussed.   Patient has been instructed to clinic for rash, itching or unmanageable nausea.    How to store Hep C Treatment Medications  -Store Medication at room temperature below 86 degrees F  -Keep Medication in it's original container  -Do not use Medication if the seal is broken or missing    General information  It is not known if treatment will prevent you from infecting another person or reinfecting yourself with the hepatitis C virus during treatment. It is best that as soon as you start treatment to buy a new toothbrush, disposable razors (if you use a rotating shaver you do not need to buy a new one) and nail clippers. If you wear dentures, you should soak your dentures in 70-90% Isopropyl solution for 5 minutes one time within the first week of starting treatment. After dentures are done soaking, rinse your dentures off thoroughly with water. If you check your blood sugar at home, please dispose of the fingerstick needle after each use and DO NOT REUSE the insulin needles. These items should not be shared with anyone.        If you have any questions, please contact the main clinic at [phone] or your clinical RN at [phone]. We appreciate you choosing the [facility] for your treatment. Patient agrees to Hep C treatment POC and verbalizes understanding. Patient will receive a copy of treatment plan in the mail, address verified with patient. Patient has no further questions or concerns. Hep C care team updated on patient status.      Tana Duncan RN Care Coordinator   [facility]  Hepatology Clinic/Specialty  Program

## 2021-06-28 NOTE — LETTER
June 29, 2021       TO: Laurie LANDIS Kwesi  [address]  [address]  [place] [address]       Dear ,      Hepatitis C Treatment    Treatment: Mavyret x 8 weeks    Please have labs drawn as close to the date indicated at an [facility].    Start Date: 06/29/21    Week 8-End of Treatment  HCV RNA Quant Lab due: 8/24/2021  Please have this lab drawn on or within a week after this date 8/24/2021. You will need to repeat this lab 3 months after completing treatment to ensure you have cleared the virus. Please see date for the final lab below. You do not need to fast for this lab.       3 Months Post Treatment  HCV RNA Quant Lab due: 11/22/2021  Please have this lab drawn on the specified date of 11/22/2021 or within a week after. This final lab will determine if you have cleared the Hepatitis C virus with Mavyret treatment. Without this final lab, we will be unable to determine if treatment was successful. You do not need to fast for this lab.     Educational information to patient on Hep C treatment;     -Contact the [facility] Hepatology clinic and speak with clinical RN prior to starting any new prescribed or OTC medications.   -Take medications exactly as prescribed, do not change dose or stop taking without consulting your provider.   -Take Medication one time each day with or without food  -If you miss a dose of medication, then take it as soon as you remember on the same day. If not remembered on the same day, then skip the dose and take the next dose at the usual time. Do not take more than the recommended dose. Contact the clinic if you miss a dose.    Please contact the pharmacy 1-2 weeks prior to needing a medication refill.      Side Effects  The most common side effects of Hep C medication treatment can include:  -tiredness  -headache  -nausea  Notify the clinic of any side effects that bother you or that do not go away.   Possible side effects have been discussed.   Patient has  been instructed to clinic for rash, itching or unmanageable nausea.    How to store Hep C Treatment Medications  -Store Medication at room temperature below 86 degrees F  -Keep Medication in it's original container  -Do not use Medication if the seal is broken or missing    General information  It is not known if treatment will prevent you from infecting another person or reinfecting yourself with the hepatitis C virus during treatment. It is best that as soon as you start treatment to buy a new toothbrush, disposable razors (if you use a rotating shaver you do not need to buy a new one) and nail clippers. If you wear dentures, you should soak your dentures in 70-90% Isopropyl solution for 5 minutes one time within the first week of starting treatment. After dentures are done soaking, rinse your dentures off thoroughly with water. If you check your blood sugar at home, please dispose of the fingerstick needle after each use and DO NOT REUSE the insulin needles. These items should not be shared with anyone.        If you have any questions, please contact the main clinic at [phone] or your clinical RN at [phone]. We appreciate you choosing the [facility] for your treatment.     Tana Duncan RN Care Coordinator   [facility]  Hepatology Clinic/Specialty Program

## 2021-07-01 ENCOUNTER — TELEPHONE (OUTPATIENT)
Dept: GASTROENTEROLOGY | Facility: CLINIC | Age: 81
End: 2021-07-01

## 2021-07-01 RX ORDER — BUTALBITAL, ACETAMINOPHEN AND CAFFEINE 50; 325; 40 MG/1; MG/1; MG/1
1-2 TABLET ORAL 2 TIMES DAILY PRN
Refills: 0 | COMMUNITY
Start: 2021-07-01 | End: 2022-01-01 | Stop reason: DRUGHIGH

## 2021-07-01 NOTE — TELEPHONE ENCOUNTER
I have never prescribed the Fioricet/esgic for patient before.    I queried the [place] prescription drug monitoring program and Dr. Trupti Martinez is the the provider prescribing that medication.  Patient should contact that provider to get the refill.    Sunita Brito, DNP, APRN, CNP

## 2021-07-01 NOTE — TELEPHONE ENCOUNTER
Called patient.  She gets this prescription through [facility] neurology.  Asked patient to please call them for a refill.    Patient agreeable.      Isa Berg RN

## 2021-07-01 NOTE — TELEPHONE ENCOUNTER
[facility] Call Center    Phone Message    May a detailed message be left on voicemail: yes     Reason for Call: Medication Refill Request    Has the patient contacted the pharmacy for the refill? Yes   Name of medication being requested: butalbital-acetaminophen-caffeine (FIORICET/ESGIC) -40 MG tablet  Provider who prescribed the medication: Carlos Montejo  Pharmacy: [facility] [address]  Date medication is needed: ASAP     Patient only have enough for 1 or 2 days.      Action Taken: Message routed to:  [facility]    Travel Screening: Not Applicable

## 2021-07-21 NOTE — PROGRESS NOTES
Called pt to check in since starting Mavyret. Pt reported increased fatigue and headaches but stated symptoms are manageable. Pt has been compliant with Mavyret and has not missed any doses. Reminded pt that end of treatment labs are needed on 8/24. Pt verbalized understanding and is agreeable to plan of care.

## 2021-07-27 ENCOUNTER — TELEPHONE (OUTPATIENT)
Dept: GASTROENTEROLOGY | Facility: CLINIC | Age: 81
End: 2021-07-27

## 2021-07-27 NOTE — TELEPHONE ENCOUNTER
Health Call Center    Phone Message    May a detailed message be left on voicemail: yes     Reason for Call: Other:Patient returned Stephanie call, please call  Laurie at [phone]. States that she is taking Mavyret and states that she has a question regarding medication and headaches.     Please advise and call Laurie back at your earliest convenience     Action Taken: Other:  HEPATOLOGY    Travel Screening: Not Applicable

## 2021-07-27 NOTE — TELEPHONE ENCOUNTER
Pt reported increased headaches since starting hepatitis C treatment and is wondering what she can take for headaches. Pt is prescribed butalbital-acetaminophen-caffeine by neurologist for migraines and stated that she is currently out of this medication. Pt does not think her neurologist will refill the prescription for at least another month and does not think she can continue taking hepatitis C treatment if headaches do not improve.    Reviewed pt's symptoms with Carlos Montejo and Carlos pettit writer reach out to pt's neurology clinic, [facility] neurological clinic, for additional assistance/guidance.     Message left for Dr. Trupti Martinez and pt notified of call.

## 2021-07-28 NOTE — TELEPHONE ENCOUNTER
Received a return call from [facility] and pt's neurologist will manage pt's headaches and refill pt's prescription for butalbital. Neurology clinic will update pt.

## 2021-08-09 ENCOUNTER — TELEPHONE (OUTPATIENT)
Dept: FAMILY MEDICINE | Facility: CLINIC | Age: 81
End: 2021-08-09

## 2021-08-09 NOTE — PROGRESS NOTES
Pt called with complaints of diarrhea x 5 days. Pt has been taking imodium as needed but does not feel this is helping. Advised pt to reach out to primary care regarding symptoms as diarrhea is not related to hepatitis C treatment. Encouraged pt to stay hydrated and eat bland foods. Pt verbalized understanding and agreeable to plan.

## 2021-08-09 NOTE — TELEPHONE ENCOUNTER
Pt calling in stating she was started on Mavyret 2 1/2 weeks ago for Hep. C by GI.  About 4-5 days ago she started to experience diarrhea. She is having diarrhea about 4 times a day. It is runny liquid mucus brown when she wipes.  She does have a hx of Colitis, but when she has a flair up it usually only lasts a few hours, not days.    She called GI and they instructed her to call her PCP office as they stated this is not due to Hep.C.    Pt denies any abd. Pain, bleeding, N/V, fever. Pt scheduled to be seen in clinic tomorrow 8/10/21.  Notified pt that if at anytime she experiences any new or worsening symptoms abd  Pain, bleeding, N/V, fever she should be seen sooner in UC or ER.    Pt verbalized an understanding.    Kathryn Trejo RN

## 2021-08-10 ENCOUNTER — TELEPHONE (OUTPATIENT)
Dept: FAMILY MEDICINE | Facility: CLINIC | Age: 81
End: 2021-08-10

## 2021-08-10 ENCOUNTER — OFFICE VISIT (OUTPATIENT)
Dept: FAMILY MEDICINE | Facility: CLINIC | Age: 81
End: 2021-08-10
Payer: COMMERCIAL

## 2021-08-10 VITALS
DIASTOLIC BLOOD PRESSURE: 60 MMHG | SYSTOLIC BLOOD PRESSURE: 126 MMHG | TEMPERATURE: 98.5 F | BODY MASS INDEX: 24.23 KG/M2 | HEART RATE: 84 BPM | OXYGEN SATURATION: 95 % | WEIGHT: 136.8 LBS | RESPIRATION RATE: 22 BRPM

## 2021-08-10 DIAGNOSIS — K55.9 ISCHEMIC COLITIS (H): ICD-10-CM

## 2021-08-10 DIAGNOSIS — I26.99 PULMONARY EMBOLISM AND INFARCTION (H): ICD-10-CM

## 2021-08-10 DIAGNOSIS — F25.9 SCHIZOAFFECTIVE DISORDER, CHRONIC CONDITION (H): ICD-10-CM

## 2021-08-10 DIAGNOSIS — R19.7 DIARRHEA, UNSPECIFIED TYPE: Primary | ICD-10-CM

## 2021-08-10 PROCEDURE — 99213 OFFICE O/P EST LOW 20 MIN: CPT | Performed by: FAMILY MEDICINE

## 2021-08-10 ASSESSMENT — PAIN SCALES - GENERAL: PAINLEVEL: MODERATE PAIN (4)

## 2021-08-10 ASSESSMENT — PATIENT HEALTH QUESTIONNAIRE - PHQ9: SUM OF ALL RESPONSES TO PHQ QUESTIONS 1-9: 0

## 2021-08-10 NOTE — TELEPHONE ENCOUNTER
Can a nurse call her ?  I just saw her and she was doing better  Normal exam and Vitals sign  thank   Xray Ankle Complete 3 Views, Right

## 2021-08-10 NOTE — TELEPHONE ENCOUNTER
Please call patient ASAP regarding diarrhea. Patient says she was just seen by Dr. Pizarro. Today.  Jayne Holden RN on 8/10/2021 at 4:30 PM

## 2021-08-10 NOTE — TELEPHONE ENCOUNTER
Routing to Provider    Patient states diarrhea one time since coming home, she states it was brown liquid- no traces of blood.  Her stomach feels fine now.    She did admit to eating cereal and milk for lunch. Reiterated no dairy although she states she does love so unsure if she has been following guidelines for BRAT type diet.    She plans on eating  chicken noodle soup for dinner and will follow up with us tomorrow regarding anymore episodes of diarrhea.  She can take OTC imodium if she would like       Isa Berg RN

## 2021-08-10 NOTE — PROGRESS NOTES
Assessment & Plan     Diarrhea, unspecified type  Patient reports symptom is improving.  She has no blood in the stools.  No fever, no chills.  Advised with diet modification, clear liquid diet.  Discussed with patient if symptom does not improve in the next 48 hours then she could collect stool sample for cultures.  - Enteric Bacteria and Virus Panel by SAWYER Stool; Future  - Ova and Parasite Exam Routine; Future  - Fecal leukocyte; Future    Ischemic colitis (H)  Previous history of colitis.  She has no blood in her stool, no fever, no chills.  Advised for supportive care, increase fluid intake, clear liquid, soft diet.  Avoid dairy products , milk or cheese for the next 48 to 72 hours.    History of pulmonary embolism been stable,  History of depression been stable.  No follow-ups on file.    Sinan Pizarro MD  [facility]    Kimberly Sullivan is a 80 year old who presents for the following health issues  accompanied by her daughter-in-law:  Patient reports for the past 5 days she has been having diarrhea, initially it was diffuse with multiple times throughout the day.  Now for the past 2 days been slowing down up to 2 times per day, more watery.  She denies any blood in the stool, denies any nausea or vomiting, she has no fever.    Patient reports she has history of ischemic colitis.  However, she denies any blood in the stool, denies nausea or vomiting.  No pain.    Patient reports almost 6 weeks ago she was in amoxicillin for dental pain work-up.    History of hepatitis B she was started Mavyret, but no history of Diarrhea before.    Diarrhea  Onset/Duration: Thursday  Description:       Consistency of stool: mucousy       Blood in stool: no       Number of loose stools past 24 hours: 4 small ones  Progression of Symptoms: improving and constant  Accompanying signs and symptoms:       Fever: no       Nausea/Vomiting: no       Abdominal pain: YES- across abdomen       Weight  loss: no       Episodes of constipation: no  History   Ill contacts: no  Recent use of antibiotics: YES  Recent travels: no  Recent medication-new or changes(Rx or OTC): YES  Precipitating or alleviating factors: after starting new medication  Therapies tried and outcome: Imodium; did not help    Review of Systems   Constitutional, HEENT, cardiovascular, pulmonary, gi and gu systems are negative, except as otherwise noted.      Objective    There were no vitals taken for this visit.  There is no height or weight on file to calculate BMI.  Physical Exam   GENERAL: healthy, alert and no distress  ABDOMEN: soft, nontender, no hepatosplenomegaly, no masses and bowel sounds normal  BACK: no CVA tenderness, no paralumbar tenderness    Orders Placed This Encounter   Procedures     Enteric Bacteria and Virus Panel by [facility]WYER Stool     Standing Status:   Future     Standing Expiration Date:   8/10/2022     Order Specific Question:   Has patient been hospitalized for greater than 3 days?     Answer:   No     Ova and Parasite Exam Routine     Standing Status:   Future     Standing Expiration Date:   8/10/2022     Fecal leukocyte     Standing Status:   Future     Standing Expiration Date:   11/8/2021       Sinan Pizarro MD

## 2021-08-12 ENCOUNTER — TELEPHONE (OUTPATIENT)
Dept: FAMILY MEDICINE | Facility: CLINIC | Age: 81
End: 2021-08-12

## 2021-08-12 NOTE — TELEPHONE ENCOUNTER
Patient wondering about a stool sample    She has not had any BM diarrhea for 2 days.  Was wondering if her stool sample kit was going to .  Informed that the cup and the hat will not  by tomorrow.  She will either call or bring her sample.        Isa Berg RN

## 2021-08-13 ENCOUNTER — TRANSFERRED RECORDS (OUTPATIENT)
Dept: HEALTH INFORMATION MANAGEMENT | Facility: CLINIC | Age: 81
End: 2021-08-13

## 2021-08-25 ENCOUNTER — LAB (OUTPATIENT)
Dept: LAB | Facility: CLINIC | Age: 81
End: 2021-08-25
Payer: COMMERCIAL

## 2021-08-25 DIAGNOSIS — B18.2 CHRONIC HEPATITIS C WITHOUT HEPATIC COMA (H): ICD-10-CM

## 2021-08-25 PROCEDURE — 87522 HEPATITIS C REVRS TRNSCRPJ: CPT

## 2021-08-25 PROCEDURE — 36415 COLL VENOUS BLD VENIPUNCTURE: CPT

## 2021-08-25 NOTE — LETTER
August 29, 2021       TO: Laurie A Kwesi  [address]  [address] [place] [address]       Dear Ms. Orosco,    We are writing to inform you of your test results.    Your end of treatment Hepatitis C labs show that the virus is not detectable in your body.     Repeat labs after 11/22/2021 to determine final cure!     It was a pleasure to see you at your recent visit. Please let me know if you have any questions or concerns.     Clinic Staff - [phone] option 3     Sincerely,     CONNIE Ponce  [address] [place] [address], Mail Code [id number]  [place]  [address].

## 2021-08-27 ENCOUNTER — TELEPHONE (OUTPATIENT)
Dept: FAMILY MEDICINE | Facility: CLINIC | Age: 81
End: 2021-08-27

## 2021-08-27 NOTE — TELEPHONE ENCOUNTER
Forms received from Hepatitis C supplement Report/ [facility]/Date 8/20/2021 for Sunita Brito NP.  Forms placed in provider 'sign me' folder.  Please fax forms to [phone] after completion.    BETH PARRA (R)  Radiology Department

## 2021-08-29 LAB — HCV RNA SERPL NAA+PROBE-ACNC: NOT DETECTED IU/ML

## 2021-08-30 ENCOUNTER — TRANSFERRED RECORDS (OUTPATIENT)
Dept: HEALTH INFORMATION MANAGEMENT | Facility: CLINIC | Age: 81
End: 2021-08-30

## 2021-09-01 ENCOUNTER — TELEPHONE (OUTPATIENT)
Dept: GASTROENTEROLOGY | Facility: CLINIC | Age: 81
End: 2021-09-01

## 2021-09-01 NOTE — TELEPHONE ENCOUNTER
[facility]    Phone Message    May a detailed message be left on voicemail: yes     Reason for Call: Requesting Results   Name/type of test: Carlos Montejo,  Date of test: 08/25/21  Was test done at a location other than [facility] (Please fill in the location if not [facility])?: No    Please call to discuss next steps based on results.     Action Taken: Message routed to:  [facility]  HEPATOLOGY    Travel Screening: Not Applicable

## 2021-09-02 NOTE — TELEPHONE ENCOUNTER
Called pt to review results from 8/25. Notified pt that hepatitis C virus was not detected in pt's blood. Reviewed that pt will still need to get labs drawn around 11/22 to determine whether pt is cured. Pt verbalized understanding and is agreeable to plan.

## 2021-11-22 NOTE — LETTER
November 27, 2021       TO: Laurie LANDIS Kwesi  [address]  [address] [place], [place] [id number]       Dear Ms. Orosco,    We are writing to inform you of your test results.    Your Hepatitis C is officially cured!     If a cure is achieved:    1) You are no longer considered to be infectious for Hepatitis C.     2) A cure does not mean you have immunity. You can still be reinfected if you come into contact with infected blood. Avoiding sex with infected people, avoid reusing needles and razors that came into contact with infected blood.     3) You will always test positive for the Hepatitis C antibody. Your Hepatitis C RNA Quant (viral level) will remain undetected as long as you avoid risks for reinfection.     Please let me know if you have any questions or concerns.     Clinic Staff - [phone] option 3     Sincerely,     CONNIE Ponce  [address], Mail [id number]  [place]  [address].

## 2021-11-29 NOTE — TELEPHONE ENCOUNTER
[facility]    Phone Message    May a detailed message be left on voicemail: yes     Reason for Call: Requesting Results   Name/type of test: Labs  Date of test: 11/22/2021  Was test done at a location other than [facility] (Please fill in the location if not [facility])?: No    Patient looking for lab results to see if hepatitis is cured.      Action Taken: Message routed to:  [facility] Hepatology    Travel Screening: Not Applicable

## 2021-11-29 NOTE — TELEPHONE ENCOUNTER
Pt had SVR 12 labs drawn on 11/22 and hepatitis C virus was not detected in pt's blood. Returned pt's call and notified pt that she is cured of hepatitis C and does not need any additional follow up in the hepatology clinic. Letter also sent to pt. Pt verbalized understanding.

## 2021-12-01 NOTE — TELEPHONE ENCOUNTER
Pt calling in stating she thinks she may have a UTI. She has not had one in a long time but is experiencing burning with urination, says her urine has an odor and she feels more tired/run down than usual.    No clinic appts available. Scheduled pt for a telephone visit tomorrow. Notified pt she may need to come into the clinic after the phone visit to leave urine sample. Pt verbalized an understanding    Kathryn Trejo RN

## 2021-12-02 NOTE — PROGRESS NOTES
Laurie is a 81 year old who is being evaluated via a billable telephone visit.      What phone number would you like to be contacted at? [phone]  How would you like to obtain your AVS? MyChart    Assessment & Plan     Dysuria  Dysuria started 3 days ago, however today has not had any symptoms. She did hydrate a lot in the two days prior. Discussed that she may have cleared a potential infection. To know for sure, we can obtain a UA. Will have her call to schedule a lab appointment - discussed that if her symptoms don't return prior to that appt she can cancel the lab visit. Continue increasing fluids.   - UA Macro with Reflex to Micro and Culture - lab collect; Future    Vaginal discharge  Will have her obtain a wet prep when she comes for her lab draw due to increased yellow discharge.   - Wet prep - lab collect; Future    Return in about 1 week (around 12/9/2021), or if symptoms worsen or fail to improve.    Abimbola Candelaria DO  [facility]   Laurie is a 81 year old who presents for the following health issues     HPI     Genitourinary - Female  Onset/Duration: Tuesday -2 days  Description:   Painful urination (Dysuria): YES           Frequency: no  Blood in urine (Hematuria): no  Delay in urine (Hesitency): no  Intensity: moderate  Progression of Symptoms:  improving  Accompanying Signs & Symptoms:  Fever/chills: no  Flank pain: YES a little on Tuesday  Nausea and vomiting: no  Vaginal symptoms: odor  Abdominal/Pelvic Pain: no  History:   History of frequent UTI s: no  History of kidney stones: no  Sexually Active: no  Possibility of pregnancy: No  Precipitating or alleviating factors: None  Therapies tried and outcome: OTC advil or tylenol       Thinks she has a bladder infection.   Started 3 days ago. The first two days she was having burning with urination and odor of the urine. She drank a lot of water. Today she is feeling better. Hasn't had any burning with  urination yet today. Had some back pain on the day it started but that resolved. Denies hematuria. She has increased yellow vaginal discharge. No perineal itching.     Review of Systems   Constitutional, HEENT, cardiovascular, pulmonary, gi and gu systems are negative, except as otherwise noted.      Objective           Vitals:  No vitals were obtained today due to virtual visit.    Physical Exam   healthy, alert and no distress  PSYCH: Alert and oriented times 3; coherent speech, normal   rate and volume, able to articulate logical thoughts, able   to abstract reason, no tangential thoughts, no hallucinations   or delusions  Her affect is normal  RESP: No cough, no audible wheezing, able to talk in full sentences  Remainder of exam unable to be completed due to telephone visits        Phone call duration: 9 minutes

## 2021-12-02 NOTE — PATIENT INSTRUCTIONS
Patient Education     Dysuria  Dysuria is when you have pain during urination. It is often described as a burning feeling. Learn more about this problem and how it can be treated.     Painful urination (dysuria) is often caused by a problem in the urinary tract.   What causes dysuria?  Possible causes include:    Infection with a bacteria or virus such as a urinary tract infection (UTI) or a sexually transmitted infection (STI)    Sensitivity or allergy to chemicals such as those found in lotions and other products    Prostate or bladder problems    Radiation therapy to the pelvic area  How is dysuria diagnosed?  Your healthcare provider will examine you. He or she will ask about your symptoms and health. After talking with you and doing a physical exam, your healthcare provider may know what is causing your dysuria. You will often have to give a urine sample. Tests of your urine (urinalysis) are done. A urinalysis may include:    Looking at the urine sample (visual exam)    Checking for substances (chemical exam)    Looking at a small amount of the urine under a microscope (microscopic exam)  Some parts of the urinalysis may be done in the provider's office and some in a lab. The urine sample may also be checked for bacteria and yeast (urine culture). Your healthcare provider will tell you more about these tests if they are needed.  How is dysuria treated?  Treatment depends on the cause. If you have a bacterial infection, you may need antibiotics. You may be given medicines to make it easier for you to urinate and help ease pain. Your healthcare provider can tell you more about your treatment options. If not treated, symptoms may get worse.  When to call your healthcare provider  Call the healthcare provider right away if you have any of the following:    Fever of  100.4  F ( 38 C) or higher, or as directed by your provider    No improvement after 3 days of treatment    Trouble urinating because of pain    New  or increased discharge from the vagina or penis    Rash or joint pain    Increased back or belly pain    Enlarged painful lymph nodes (lumps) in the groin  [employer] last reviewed this educational content on 4/1/2019 2000-2021 [employer]. All rights reserved. This information is not intended as a substitute for professional medical care. Always follow your healthcare professional's instructions.

## 2021-12-03 NOTE — TELEPHONE ENCOUNTER
Routing to Provider     Patient calling for UA results, looks like culture is still pending       Isa Berg, RN

## 2021-12-07 NOTE — TELEPHONE ENCOUNTER
"Spoke with pt. She completed antibiotics for UTI last night.  Mainly is having burning with urination. Does not have burning all the time.  Took last dose of antibiotic last night.  Has been drinking lots of fluids. Urine has an odor.  Is also feeling really tired. No vaginal discharge. No fever or chills. Pt is requesting a refill of the antibiotic or a different antibiotic to treat. Advised per result note: \"Dear Laurie,      Your urine culture did return negative for a bladder infection, so you can stop your antibiotic. Increase your fluids and if your symptoms return, please be seen again.      Please let us know if you have any questions or concerns.     Regards,  Abimbola Candelaria, DO\"    Doesn't drive, so has a hard time getting to clinic. Please advise.      "

## 2021-12-08 NOTE — TELEPHONE ENCOUNTER
Routing to Sunita Brito NP -     She was prescribed sulfamethoxazole-trimethoprim (BACTRIM DS) 800-160 MG tablet Take 1 tablet by mouth 2 times daily for 3 days     Rx pending below if needed    Kathryn Trejo RN

## 2021-12-08 NOTE — TELEPHONE ENCOUNTER
Her cultures returned negative for infection. I don't recommend different antibiotics. What was she treated with I can't see in the MAR

## 2021-12-09 NOTE — TELEPHONE ENCOUNTER
Reviewed micromedex drug-drug interactions and combination did not flag with concerns.  Okay for Diflucan.    Sunita Brito, DNP, APRN, CNP

## 2021-12-09 NOTE — TELEPHONE ENCOUNTER
Reviewing case, it looks like Dr. Candelaria did virtual visit with patient 12/2/21 and urine culture from 12/3/21 was negative for urine infection and symptoms not resolved with Bactrim.    Reviewed wet prep results from 12/3/21 which shows both yeast and clue cells.    Recommend treating the yeast and BV with Metronidazole and Diflucan to be taken after the Metronidazole.  Please advise patient to not drink alcohol with Metronidazole.    Sunita Brito, DNP, APRN, CNP

## 2021-12-09 NOTE — TELEPHONE ENCOUNTER
Celena Sullivan     Explained culture results to patient and how to take medications.      Isa Berg RN

## 2021-12-09 NOTE — TELEPHONE ENCOUNTER
[facility] pharmacy calling.     Diflucan has a high interaction risk with butalbital-acetaminophen-caffeine (patient takes PRN).     Pharmacist needs verbal authorization from PCP if OK to still dispense?     Routed to PCP to review and advise.     Cierra Fischer RN, BSN, PHN  [facility]: [place]

## 2022-01-01 ENCOUNTER — TELEPHONE (OUTPATIENT)
Dept: FAMILY MEDICINE | Facility: CLINIC | Age: 82
End: 2022-01-01
Payer: COMMERCIAL

## 2022-01-01 ENCOUNTER — LAB (OUTPATIENT)
Dept: LAB | Facility: CLINIC | Age: 82
End: 2022-01-01
Payer: COMMERCIAL

## 2022-01-01 ENCOUNTER — APPOINTMENT (OUTPATIENT)
Dept: PHYSICAL THERAPY | Facility: CLINIC | Age: 82
DRG: 470 | End: 2022-01-01
Attending: ORTHOPAEDIC SURGERY
Payer: COMMERCIAL

## 2022-01-01 ENCOUNTER — VIRTUAL VISIT (OUTPATIENT)
Dept: FAMILY MEDICINE | Facility: CLINIC | Age: 82
End: 2022-01-01
Payer: COMMERCIAL

## 2022-01-01 ENCOUNTER — APPOINTMENT (OUTPATIENT)
Dept: PHYSICAL THERAPY | Facility: CLINIC | Age: 82
DRG: 470 | End: 2022-01-01
Attending: PHYSICIAN ASSISTANT
Payer: COMMERCIAL

## 2022-01-01 ENCOUNTER — APPOINTMENT (OUTPATIENT)
Dept: OCCUPATIONAL THERAPY | Facility: CLINIC | Age: 82
DRG: 470 | End: 2022-01-01
Attending: ORTHOPAEDIC SURGERY
Payer: COMMERCIAL

## 2022-01-01 ENCOUNTER — ANESTHESIA (OUTPATIENT)
Dept: SURGERY | Facility: CLINIC | Age: 82
DRG: 470 | End: 2022-01-01
Payer: COMMERCIAL

## 2022-01-01 ENCOUNTER — LAB REQUISITION (OUTPATIENT)
Dept: LAB | Facility: CLINIC | Age: 82
End: 2022-01-01
Payer: COMMERCIAL

## 2022-01-01 ENCOUNTER — TRANSFERRED RECORDS (OUTPATIENT)
Dept: HEALTH INFORMATION MANAGEMENT | Facility: CLINIC | Age: 82
End: 2022-01-01
Payer: COMMERCIAL

## 2022-01-01 ENCOUNTER — APPOINTMENT (OUTPATIENT)
Dept: RADIOLOGY | Facility: CLINIC | Age: 82
DRG: 470 | End: 2022-01-01
Attending: PHYSICIAN ASSISTANT
Payer: COMMERCIAL

## 2022-01-01 ENCOUNTER — TELEPHONE (OUTPATIENT)
Dept: FAMILY MEDICINE | Facility: CLINIC | Age: 82
End: 2022-01-01

## 2022-01-01 ENCOUNTER — ANESTHESIA EVENT (OUTPATIENT)
Dept: SURGERY | Facility: CLINIC | Age: 82
DRG: 470 | End: 2022-01-01
Payer: COMMERCIAL

## 2022-01-01 ENCOUNTER — HOSPITAL ENCOUNTER (INPATIENT)
Facility: CLINIC | Age: 82
LOS: 1 days | Discharge: NURSING, REHAB OR LONG-TERM CARE | DRG: 470 | End: 2022-04-12
Attending: ORTHOPAEDIC SURGERY | Admitting: ORTHOPAEDIC SURGERY
Payer: COMMERCIAL

## 2022-01-01 ENCOUNTER — OFFICE VISIT (OUTPATIENT)
Dept: FAMILY MEDICINE | Facility: CLINIC | Age: 82
End: 2022-01-01
Payer: COMMERCIAL

## 2022-01-01 ENCOUNTER — HEALTH MAINTENANCE LETTER (OUTPATIENT)
Age: 82
End: 2022-01-01

## 2022-01-01 ENCOUNTER — PATIENT OUTREACH (OUTPATIENT)
Dept: FAMILY MEDICINE | Facility: CLINIC | Age: 82
End: 2022-01-01
Payer: COMMERCIAL

## 2022-01-01 VITALS
DIASTOLIC BLOOD PRESSURE: 72 MMHG | OXYGEN SATURATION: 94 % | HEIGHT: 62 IN | TEMPERATURE: 98.1 F | RESPIRATION RATE: 18 BRPM | BODY MASS INDEX: 24.84 KG/M2 | SYSTOLIC BLOOD PRESSURE: 128 MMHG | HEART RATE: 90 BPM | WEIGHT: 135 LBS

## 2022-01-01 VITALS
HEART RATE: 87 BPM | OXYGEN SATURATION: 96 % | SYSTOLIC BLOOD PRESSURE: 130 MMHG | BODY MASS INDEX: 24.03 KG/M2 | WEIGHT: 135.6 LBS | DIASTOLIC BLOOD PRESSURE: 60 MMHG | HEIGHT: 63 IN | TEMPERATURE: 98.1 F

## 2022-01-01 VITALS
BODY MASS INDEX: 24.34 KG/M2 | OXYGEN SATURATION: 95 % | SYSTOLIC BLOOD PRESSURE: 144 MMHG | HEART RATE: 89 BPM | DIASTOLIC BLOOD PRESSURE: 78 MMHG | TEMPERATURE: 98.8 F | WEIGHT: 137.4 LBS | RESPIRATION RATE: 16 BRPM | HEIGHT: 63 IN

## 2022-01-01 DIAGNOSIS — I26.99 PULMONARY EMBOLISM AND INFARCTION (H): ICD-10-CM

## 2022-01-01 DIAGNOSIS — Z86.711 HISTORY OF PULMONARY EMBOLISM: ICD-10-CM

## 2022-01-01 DIAGNOSIS — R30.0 DYSURIA: Primary | ICD-10-CM

## 2022-01-01 DIAGNOSIS — N30.01 ACUTE CYSTITIS WITH HEMATURIA: Primary | ICD-10-CM

## 2022-01-01 DIAGNOSIS — I10 ESSENTIAL (PRIMARY) HYPERTENSION: ICD-10-CM

## 2022-01-01 DIAGNOSIS — G20.A1 PARKINSON'S DISEASE (H): ICD-10-CM

## 2022-01-01 DIAGNOSIS — I10 HYPERTENSION GOAL BP (BLOOD PRESSURE) < 140/90: ICD-10-CM

## 2022-01-01 DIAGNOSIS — Z96.659 STATUS POST TOTAL KNEE REPLACEMENT, UNSPECIFIED LATERALITY: ICD-10-CM

## 2022-01-01 DIAGNOSIS — N95.2 VAGINAL ATROPHY: ICD-10-CM

## 2022-01-01 DIAGNOSIS — Z96.651 STATUS POST TOTAL RIGHT KNEE REPLACEMENT: Primary | ICD-10-CM

## 2022-01-01 DIAGNOSIS — Z01.818 PRE-OP EXAM: Primary | ICD-10-CM

## 2022-01-01 DIAGNOSIS — Z11.59 ENCOUNTER FOR SCREENING FOR OTHER VIRAL DISEASES: ICD-10-CM

## 2022-01-01 DIAGNOSIS — Z86.718 PERSONAL HISTORY OF DVT (DEEP VEIN THROMBOSIS): ICD-10-CM

## 2022-01-01 DIAGNOSIS — F25.9 SCHIZOAFFECTIVE DISORDER, CHRONIC CONDITION (H): ICD-10-CM

## 2022-01-01 DIAGNOSIS — Z11.59 ENCOUNTER FOR SCREENING FOR OTHER VIRAL DISEASES: Primary | ICD-10-CM

## 2022-01-01 DIAGNOSIS — M17.11 UNILATERAL PRIMARY OSTEOARTHRITIS, RIGHT KNEE: ICD-10-CM

## 2022-01-01 DIAGNOSIS — Z96.651 STATUS POST TOTAL RIGHT KNEE REPLACEMENT: ICD-10-CM

## 2022-01-01 DIAGNOSIS — K21.9 GASTROESOPHAGEAL REFLUX DISEASE WITHOUT ESOPHAGITIS: ICD-10-CM

## 2022-01-01 DIAGNOSIS — F41.1 GENERALIZED ANXIETY DISORDER: ICD-10-CM

## 2022-01-01 DIAGNOSIS — M17.11 PRIMARY OSTEOARTHRITIS OF RIGHT KNEE: ICD-10-CM

## 2022-01-01 DIAGNOSIS — R30.0 DYSURIA: ICD-10-CM

## 2022-01-01 DIAGNOSIS — N89.8 VAGINAL ODOR: ICD-10-CM

## 2022-01-01 DIAGNOSIS — B18.2 CHRONIC HEPATITIS C WITHOUT HEPATIC COMA (H): ICD-10-CM

## 2022-01-01 DIAGNOSIS — I51.7 LVH (LEFT VENTRICULAR HYPERTROPHY): ICD-10-CM

## 2022-01-01 DIAGNOSIS — K55.9 ISCHEMIC COLITIS (H): ICD-10-CM

## 2022-01-01 DIAGNOSIS — N32.89 BLADDER IRRITATION: ICD-10-CM

## 2022-01-01 DIAGNOSIS — N39.41 URGENCY INCONTINENCE: ICD-10-CM

## 2022-01-01 DIAGNOSIS — N32.81 OVERACTIVE BLADDER: ICD-10-CM

## 2022-01-01 DIAGNOSIS — N30.01 ACUTE CYSTITIS WITH HEMATURIA: ICD-10-CM

## 2022-01-01 LAB
ABO/RH(D): NORMAL
ALBUMIN UR-MCNC: 30 MG/DL
ALBUMIN UR-MCNC: 300 MG/DL
ALBUMIN UR-MCNC: NEGATIVE MG/DL
AMORPH CRY #/AREA URNS HPF: ABNORMAL /HPF
ANION GAP SERPL CALCULATED.3IONS-SCNC: 12 MMOL/L (ref 5–18)
ANION GAP SERPL CALCULATED.3IONS-SCNC: 2 MMOL/L (ref 3–14)
ANTIBODY SCREEN: NEGATIVE
APPEARANCE UR: ABNORMAL
APPEARANCE UR: CLEAR
APPEARANCE UR: CLEAR
BACTERIA #/AREA URNS HPF: ABNORMAL /HPF
BACTERIA UR CULT: ABNORMAL
BACTERIA UR CULT: NORMAL
BILIRUB UR QL STRIP: NEGATIVE
BLD PROD TYP BPU: NORMAL
BLOOD COMPONENT TYPE: NORMAL
BUN SERPL-MCNC: 16 MG/DL (ref 8–28)
BUN SERPL-MCNC: 20 MG/DL (ref 7–30)
CALCIUM SERPL-MCNC: 8.4 MG/DL (ref 8.5–10.1)
CALCIUM SERPL-MCNC: 8.7 MG/DL (ref 8.5–10.5)
CHLORIDE BLD-SCNC: 104 MMOL/L (ref 98–107)
CHLORIDE BLD-SCNC: 107 MMOL/L (ref 94–109)
CLUE CELLS: ABNORMAL
CO2 SERPL-SCNC: 22 MMOL/L (ref 22–31)
CO2 SERPL-SCNC: 31 MMOL/L (ref 20–32)
CODING SYSTEM: NORMAL
COLOR UR AUTO: YELLOW
CREAT SERPL-MCNC: 0.67 MG/DL (ref 0.6–1.1)
CREAT SERPL-MCNC: 0.79 MG/DL (ref 0.52–1.04)
CROSSMATCH: NORMAL
ERYTHROCYTE [DISTWIDTH] IN BLOOD BY AUTOMATED COUNT: 14.2 % (ref 10–15)
ERYTHROCYTE [DISTWIDTH] IN BLOOD BY AUTOMATED COUNT: 15 % (ref 10–15)
GFR SERPL CREATININE-BSD FRML MDRD: 76 ML/MIN/1.73M2
GFR SERPL CREATININE-BSD FRML MDRD: 88 ML/MIN/1.73M2
GLUCOSE BLD-MCNC: 80 MG/DL (ref 70–99)
GLUCOSE BLD-MCNC: 81 MG/DL (ref 70–125)
GLUCOSE BLDC GLUCOMTR-MCNC: 105 MG/DL (ref 70–99)
GLUCOSE BLDC GLUCOMTR-MCNC: 154 MG/DL (ref 70–99)
GLUCOSE BLDC GLUCOMTR-MCNC: 88 MG/DL (ref 70–99)
GLUCOSE UR STRIP-MCNC: NEGATIVE MG/DL
HCT VFR BLD AUTO: 33.7 % (ref 35–47)
HCT VFR BLD AUTO: 42 % (ref 35–47)
HGB BLD-MCNC: 10.7 G/DL (ref 11.7–15.7)
HGB BLD-MCNC: 13.4 G/DL (ref 11.7–15.7)
HGB BLD-MCNC: 8.2 G/DL (ref 11.7–15.7)
HGB BLD-MCNC: 8.7 G/DL (ref 11.7–15.7)
HGB BLD-MCNC: 9 G/DL (ref 11.7–15.7)
HGB BLD-MCNC: 9 G/DL (ref 11.7–15.7)
HGB BLD-MCNC: 9.5 G/DL (ref 11.7–15.7)
HGB UR QL STRIP: ABNORMAL
HGB UR QL STRIP: ABNORMAL
HGB UR QL STRIP: NEGATIVE
ISSUE DATE AND TIME: NORMAL
KETONES UR STRIP-MCNC: NEGATIVE MG/DL
LEUKOCYTE ESTERASE UR QL STRIP: ABNORMAL
MCH RBC QN AUTO: 30.7 PG (ref 26.5–33)
MCH RBC QN AUTO: 30.9 PG (ref 26.5–33)
MCHC RBC AUTO-ENTMCNC: 31.8 G/DL (ref 31.5–36.5)
MCHC RBC AUTO-ENTMCNC: 31.9 G/DL (ref 31.5–36.5)
MCV RBC AUTO: 96 FL (ref 78–100)
MCV RBC AUTO: 97 FL (ref 78–100)
NITRATE UR QL: NEGATIVE
NITRATE UR QL: NEGATIVE
NITRATE UR QL: POSITIVE
PH UR STRIP: 6.5 [PH] (ref 5–7)
PH UR STRIP: 7 [PH] (ref 5–7)
PH UR STRIP: 8.5 [PH] (ref 5–7)
PLATELET # BLD AUTO: 195 10E3/UL (ref 150–450)
PLATELET # BLD AUTO: 363 10E3/UL (ref 150–450)
POTASSIUM BLD-SCNC: 3.5 MMOL/L (ref 3.5–5)
POTASSIUM BLD-SCNC: 4.1 MMOL/L (ref 3.4–5.3)
RBC # BLD AUTO: 3.46 10E6/UL (ref 3.8–5.2)
RBC # BLD AUTO: 4.37 10E6/UL (ref 3.8–5.2)
RBC #/AREA URNS AUTO: ABNORMAL /HPF
RBC #/AREA URNS AUTO: ABNORMAL /HPF
RBC URINE: 2 /HPF
SARS-COV-2 RNA RESP QL NAA+PROBE: NEGATIVE
SODIUM SERPL-SCNC: 138 MMOL/L (ref 136–145)
SODIUM SERPL-SCNC: 140 MMOL/L (ref 133–144)
SP GR UR STRIP: 1.02 (ref 1–1.03)
SPECIMEN EXPIRATION DATE: NORMAL
SQUAMOUS #/AREA URNS AUTO: ABNORMAL /LPF
SQUAMOUS #/AREA URNS AUTO: ABNORMAL /LPF
TRICHOMONAS, WET PREP: ABNORMAL
UNIT ABO/RH: NORMAL
UNIT NUMBER: NORMAL
UNIT STATUS: NORMAL
UNIT TYPE ISBT: 9500
UROBILINOGEN UR STRIP-ACNC: 0.2 E.U./DL
UROBILINOGEN UR STRIP-ACNC: 0.2 E.U./DL
UROBILINOGEN UR STRIP-MCNC: <2 MG/DL
WBC # BLD AUTO: 14.5 10E3/UL (ref 4–11)
WBC # BLD AUTO: 8.3 10E3/UL (ref 4–11)
WBC #/AREA URNS AUTO: ABNORMAL /HPF
WBC #/AREA URNS AUTO: ABNORMAL /HPF
WBC URINE: 6 /HPF
WBC'S/HIGH POWER FIELD, WET PREP: ABNORMAL
YEAST, WET PREP: ABNORMAL

## 2022-01-01 PROCEDURE — 97166 OT EVAL MOD COMPLEX 45 MIN: CPT | Mod: GO

## 2022-01-01 PROCEDURE — 85027 COMPLETE CBC AUTOMATED: CPT | Performed by: NURSE PRACTITIONER

## 2022-01-01 PROCEDURE — 250N000011 HC RX IP 250 OP 636: Performed by: PHYSICIAN ASSISTANT

## 2022-01-01 PROCEDURE — 250N000013 HC RX MED GY IP 250 OP 250 PS 637: Performed by: PHYSICIAN ASSISTANT

## 2022-01-01 PROCEDURE — 250N000013 HC RX MED GY IP 250 OP 250 PS 637: Performed by: ORTHOPAEDIC SURGERY

## 2022-01-01 PROCEDURE — 250N000009 HC RX 250: Performed by: ANESTHESIOLOGY

## 2022-01-01 PROCEDURE — 97116 GAIT TRAINING THERAPY: CPT | Mod: GP | Performed by: PHYSICAL THERAPIST

## 2022-01-01 PROCEDURE — 999N000065 XR KNEE PORT RIGHT 1/2 VIEWS: Mod: RT

## 2022-01-01 PROCEDURE — 85018 HEMOGLOBIN: CPT | Performed by: PHYSICIAN ASSISTANT

## 2022-01-01 PROCEDURE — 97162 PT EVAL MOD COMPLEX 30 MIN: CPT | Mod: GP | Performed by: PHYSICAL THERAPIST

## 2022-01-01 PROCEDURE — 99232 SBSQ HOSP IP/OBS MODERATE 35: CPT | Mod: GC | Performed by: STUDENT IN AN ORGANIZED HEALTH CARE EDUCATION/TRAINING PROGRAM

## 2022-01-01 PROCEDURE — 97116 GAIT TRAINING THERAPY: CPT | Mod: GP

## 2022-01-01 PROCEDURE — 97535 SELF CARE MNGMENT TRAINING: CPT | Mod: GO

## 2022-01-01 PROCEDURE — 250N000013 HC RX MED GY IP 250 OP 250 PS 637: Performed by: STUDENT IN AN ORGANIZED HEALTH CARE EDUCATION/TRAINING PROGRAM

## 2022-01-01 PROCEDURE — 87086 URINE CULTURE/COLONY COUNT: CPT

## 2022-01-01 PROCEDURE — 36415 COLL VENOUS BLD VENIPUNCTURE: CPT | Performed by: PHYSICIAN ASSISTANT

## 2022-01-01 PROCEDURE — 86923 COMPATIBILITY TEST ELECTRIC: CPT | Performed by: STUDENT IN AN ORGANIZED HEALTH CARE EDUCATION/TRAINING PROGRAM

## 2022-01-01 PROCEDURE — 81001 URINALYSIS AUTO W/SCOPE: CPT | Performed by: FAMILY MEDICINE

## 2022-01-01 PROCEDURE — 0SRC0J9 REPLACEMENT OF RIGHT KNEE JOINT WITH SYNTHETIC SUBSTITUTE, CEMENTED, OPEN APPROACH: ICD-10-PCS | Performed by: ORTHOPAEDIC SURGERY

## 2022-01-01 PROCEDURE — 250N000009 HC RX 250: Performed by: PHYSICIAN ASSISTANT

## 2022-01-01 PROCEDURE — P9016 RBC LEUKOCYTES REDUCED: HCPCS | Performed by: STUDENT IN AN ORGANIZED HEALTH CARE EDUCATION/TRAINING PROGRAM

## 2022-01-01 PROCEDURE — 258N000003 HC RX IP 258 OP 636: Performed by: STUDENT IN AN ORGANIZED HEALTH CARE EDUCATION/TRAINING PROGRAM

## 2022-01-01 PROCEDURE — 99214 OFFICE O/P EST MOD 30 MIN: CPT | Performed by: NURSE PRACTITIONER

## 2022-01-01 PROCEDURE — 87086 URINE CULTURE/COLONY COUNT: CPT | Performed by: FAMILY MEDICINE

## 2022-01-01 PROCEDURE — 258N000003 HC RX IP 258 OP 636

## 2022-01-01 PROCEDURE — 81001 URINALYSIS AUTO W/SCOPE: CPT

## 2022-01-01 PROCEDURE — 250N000013 HC RX MED GY IP 250 OP 250 PS 637

## 2022-01-01 PROCEDURE — 80048 BASIC METABOLIC PNL TOTAL CA: CPT | Mod: ORL | Performed by: FAMILY MEDICINE

## 2022-01-01 PROCEDURE — 97110 THERAPEUTIC EXERCISES: CPT | Mod: GP

## 2022-01-01 PROCEDURE — 250N000011 HC RX IP 250 OP 636: Performed by: ANESTHESIOLOGY

## 2022-01-01 PROCEDURE — 250N000009 HC RX 250: Performed by: NURSE ANESTHETIST, CERTIFIED REGISTERED

## 2022-01-01 PROCEDURE — 36415 COLL VENOUS BLD VENIPUNCTURE: CPT | Performed by: STUDENT IN AN ORGANIZED HEALTH CARE EDUCATION/TRAINING PROGRAM

## 2022-01-01 PROCEDURE — 99214 OFFICE O/P EST MOD 30 MIN: CPT | Performed by: FAMILY MEDICINE

## 2022-01-01 PROCEDURE — 86901 BLOOD TYPING SEROLOGIC RH(D): CPT | Performed by: STUDENT IN AN ORGANIZED HEALTH CARE EDUCATION/TRAINING PROGRAM

## 2022-01-01 PROCEDURE — 36415 COLL VENOUS BLD VENIPUNCTURE: CPT | Performed by: NURSE PRACTITIONER

## 2022-01-01 PROCEDURE — 370N000017 HC ANESTHESIA TECHNICAL FEE, PER MIN: Performed by: ORTHOPAEDIC SURGERY

## 2022-01-01 PROCEDURE — 85018 HEMOGLOBIN: CPT | Performed by: STUDENT IN AN ORGANIZED HEALTH CARE EDUCATION/TRAINING PROGRAM

## 2022-01-01 PROCEDURE — 93000 ELECTROCARDIOGRAM COMPLETE: CPT | Performed by: NURSE PRACTITIONER

## 2022-01-01 PROCEDURE — 250N000009 HC RX 250: Performed by: ORTHOPAEDIC SURGERY

## 2022-01-01 PROCEDURE — 250N000011 HC RX IP 250 OP 636: Performed by: NURSE ANESTHETIST, CERTIFIED REGISTERED

## 2022-01-01 PROCEDURE — 87088 URINE BACTERIA CULTURE: CPT | Performed by: FAMILY MEDICINE

## 2022-01-01 PROCEDURE — 36415 COLL VENOUS BLD VENIPUNCTURE: CPT | Mod: ORL | Performed by: FAMILY MEDICINE

## 2022-01-01 PROCEDURE — 272N000001 HC OR GENERAL SUPPLY STERILE: Performed by: ORTHOPAEDIC SURGERY

## 2022-01-01 PROCEDURE — 250N000011 HC RX IP 250 OP 636

## 2022-01-01 PROCEDURE — 278N000051 HC OR IMPLANT GENERAL: Performed by: ORTHOPAEDIC SURGERY

## 2022-01-01 PROCEDURE — U0005 INFEC AGEN DETEC AMPLI PROBE: HCPCS | Performed by: NURSE PRACTITIONER

## 2022-01-01 PROCEDURE — 85027 COMPLETE CBC AUTOMATED: CPT | Mod: ORL | Performed by: FAMILY MEDICINE

## 2022-01-01 PROCEDURE — 999N000141 HC STATISTIC PRE-PROCEDURE NURSING ASSESSMENT: Performed by: ORTHOPAEDIC SURGERY

## 2022-01-01 PROCEDURE — 97110 THERAPEUTIC EXERCISES: CPT | Mod: GP | Performed by: PHYSICAL THERAPIST

## 2022-01-01 PROCEDURE — 87210 SMEAR WET MOUNT SALINE/INK: CPT | Performed by: STUDENT IN AN ORGANIZED HEALTH CARE EDUCATION/TRAINING PROGRAM

## 2022-01-01 PROCEDURE — 82962 GLUCOSE BLOOD TEST: CPT

## 2022-01-01 PROCEDURE — 258N000001 HC RX 258: Performed by: ORTHOPAEDIC SURGERY

## 2022-01-01 PROCEDURE — C1776 JOINT DEVICE (IMPLANTABLE): HCPCS | Performed by: ORTHOPAEDIC SURGERY

## 2022-01-01 PROCEDURE — U0003 INFECTIOUS AGENT DETECTION BY NUCLEIC ACID (DNA OR RNA); SEVERE ACUTE RESPIRATORY SYNDROME CORONAVIRUS 2 (SARS-COV-2) (CORONAVIRUS DISEASE [COVID-19]), AMPLIFIED PROBE TECHNIQUE, MAKING USE OF HIGH THROUGHPUT TECHNOLOGIES AS DESCRIBED BY CMS-2020-01-R: HCPCS | Performed by: NURSE PRACTITIONER

## 2022-01-01 PROCEDURE — 360N000077 HC SURGERY LEVEL 4, PER MIN: Performed by: ORTHOPAEDIC SURGERY

## 2022-01-01 PROCEDURE — 87186 SC STD MICRODIL/AGAR DIL: CPT | Performed by: FAMILY MEDICINE

## 2022-01-01 PROCEDURE — 99213 OFFICE O/P EST LOW 20 MIN: CPT | Mod: 95 | Performed by: STUDENT IN AN ORGANIZED HEALTH CARE EDUCATION/TRAINING PROGRAM

## 2022-01-01 PROCEDURE — 710N000010 HC RECOVERY PHASE 1, LEVEL 2, PER MIN: Performed by: ORTHOPAEDIC SURGERY

## 2022-01-01 PROCEDURE — 80048 BASIC METABOLIC PNL TOTAL CA: CPT | Performed by: NURSE PRACTITIONER

## 2022-01-01 PROCEDURE — 120N000001 HC R&B MED SURG/OB

## 2022-01-01 PROCEDURE — P9604 ONE-WAY ALLOW PRORATED TRIP: HCPCS | Mod: ORL | Performed by: FAMILY MEDICINE

## 2022-01-01 DEVICE — TIBIAL BEARING INSERT - CS
Type: IMPLANTABLE DEVICE | Site: KNEE | Status: FUNCTIONAL
Brand: TRIATHLON

## 2022-01-01 DEVICE — PATELLA
Type: IMPLANTABLE DEVICE | Site: KNEE | Status: FUNCTIONAL
Brand: TRIATHLON

## 2022-01-01 DEVICE — PRIMARY TIBIAL BASEPLATE
Type: IMPLANTABLE DEVICE | Site: KNEE | Status: FUNCTIONAL
Brand: TRIATHLON

## 2022-01-01 DEVICE — CRUCIATE RETAINING FEMORAL
Type: IMPLANTABLE DEVICE | Site: KNEE | Status: FUNCTIONAL
Brand: TRIATHLON

## 2022-01-01 DEVICE — BONE CEMENT SIMPLEX FULL DOSE 6191-1-001: Type: IMPLANTABLE DEVICE | Site: KNEE | Status: FUNCTIONAL

## 2022-01-01 RX ORDER — OXYCODONE HYDROCHLORIDE 5 MG/1
5 TABLET ORAL EVERY 4 HOURS PRN
Status: DISCONTINUED | OUTPATIENT
Start: 2022-01-01 | End: 2022-01-01

## 2022-01-01 RX ORDER — SODIUM CHLORIDE, SODIUM LACTATE, POTASSIUM CHLORIDE, CALCIUM CHLORIDE 600; 310; 30; 20 MG/100ML; MG/100ML; MG/100ML; MG/100ML
INJECTION, SOLUTION INTRAVENOUS CONTINUOUS
Status: DISCONTINUED | OUTPATIENT
Start: 2022-01-01 | End: 2022-01-01 | Stop reason: HOSPADM

## 2022-01-01 RX ORDER — POLYETHYLENE GLYCOL 3350 17 G/17G
17 POWDER, FOR SOLUTION ORAL DAILY
Status: DISCONTINUED | OUTPATIENT
Start: 2022-01-01 | End: 2022-01-01 | Stop reason: HOSPADM

## 2022-01-01 RX ORDER — SODIUM CHLORIDE, SODIUM LACTATE, POTASSIUM CHLORIDE, CALCIUM CHLORIDE 600; 310; 30; 20 MG/100ML; MG/100ML; MG/100ML; MG/100ML
INJECTION, SOLUTION INTRAVENOUS CONTINUOUS
Status: DISCONTINUED | OUTPATIENT
Start: 2022-01-01 | End: 2022-01-01

## 2022-01-01 RX ORDER — DOCUSATE SODIUM 100 MG/1
300 CAPSULE, LIQUID FILLED ORAL EVERY MORNING
COMMUNITY

## 2022-01-01 RX ORDER — OXYCODONE HYDROCHLORIDE 5 MG/1
2.5-5 TABLET ORAL EVERY 4 HOURS PRN
Qty: 30 TABLET | Refills: 0 | Status: SHIPPED | OUTPATIENT
Start: 2022-01-01 | End: 2022-01-01

## 2022-01-01 RX ORDER — FLUOXETINE 10 MG/1
10 CAPSULE ORAL DAILY
Status: DISCONTINUED | OUTPATIENT
Start: 2022-01-01 | End: 2022-01-01 | Stop reason: HOSPADM

## 2022-01-01 RX ORDER — PROCHLORPERAZINE MALEATE 5 MG
5 TABLET ORAL EVERY 6 HOURS PRN
Status: DISCONTINUED | OUTPATIENT
Start: 2022-01-01 | End: 2022-01-01 | Stop reason: HOSPADM

## 2022-01-01 RX ORDER — FERROUS SULFATE 325(65) MG
325 TABLET ORAL
Qty: 30 TABLET | Refills: 0 | Status: SHIPPED | OUTPATIENT
Start: 2022-01-01 | End: 2022-01-01

## 2022-01-01 RX ORDER — TRANEXAMIC ACID 650 MG/1
1950 TABLET ORAL ONCE
Status: COMPLETED | OUTPATIENT
Start: 2022-01-01 | End: 2022-01-01

## 2022-01-01 RX ORDER — ACETAMINOPHEN 325 MG/1
650 TABLET ORAL EVERY 4 HOURS PRN
Qty: 100 TABLET | Refills: 0 | COMMUNITY
Start: 2022-01-01

## 2022-01-01 RX ORDER — ACETAMINOPHEN 325 MG/1
975 TABLET ORAL ONCE
Status: COMPLETED | OUTPATIENT
Start: 2022-01-01 | End: 2022-01-01

## 2022-01-01 RX ORDER — MAGNESIUM HYDROXIDE 1200 MG/15ML
LIQUID ORAL PRN
Status: DISCONTINUED | OUTPATIENT
Start: 2022-01-01 | End: 2022-01-01 | Stop reason: HOSPADM

## 2022-01-01 RX ORDER — AMITRIPTYLINE HYDROCHLORIDE 50 MG/1
150 TABLET ORAL AT BEDTIME
Qty: 90 TABLET | Refills: 0 | Status: SHIPPED | OUTPATIENT
Start: 2022-01-01

## 2022-01-01 RX ORDER — HYDROCODONE BITARTRATE AND ACETAMINOPHEN 7.5; 325 MG/1; MG/1
1 TABLET ORAL EVERY 4 HOURS PRN
COMMUNITY
Start: 2022-01-01 | End: 2022-01-01

## 2022-01-01 RX ORDER — ACETAMINOPHEN 325 MG/1
975 TABLET ORAL EVERY 8 HOURS
Status: COMPLETED | OUTPATIENT
Start: 2022-01-01 | End: 2022-01-01

## 2022-01-01 RX ORDER — OXYCODONE HYDROCHLORIDE 5 MG/1
5 TABLET ORAL
Status: DISCONTINUED | OUTPATIENT
Start: 2022-01-01 | End: 2022-01-01

## 2022-01-01 RX ORDER — AMOXICILLIN 250 MG
1-2 CAPSULE ORAL 2 TIMES DAILY
Qty: 30 TABLET | Refills: 0 | COMMUNITY
Start: 2022-01-01

## 2022-01-01 RX ORDER — NALOXONE HYDROCHLORIDE 0.4 MG/ML
0.2 INJECTION, SOLUTION INTRAMUSCULAR; INTRAVENOUS; SUBCUTANEOUS
Status: DISCONTINUED | OUTPATIENT
Start: 2022-01-01 | End: 2022-01-01 | Stop reason: HOSPADM

## 2022-01-01 RX ORDER — AMITRIPTYLINE HYDROCHLORIDE 50 MG/1
150 TABLET ORAL AT BEDTIME
Status: DISCONTINUED | OUTPATIENT
Start: 2022-01-01 | End: 2022-01-01 | Stop reason: HOSPADM

## 2022-01-01 RX ORDER — GABAPENTIN 100 MG/1
200 CAPSULE ORAL AT BEDTIME
Status: DISCONTINUED | OUTPATIENT
Start: 2022-01-01 | End: 2022-01-01 | Stop reason: HOSPADM

## 2022-01-01 RX ORDER — ACETAMINOPHEN 325 MG/1
650 TABLET ORAL EVERY 4 HOURS PRN
Status: DISCONTINUED | OUTPATIENT
Start: 2022-01-01 | End: 2022-01-01

## 2022-01-01 RX ORDER — LIDOCAINE HYDROCHLORIDE 10 MG/ML
INJECTION, SOLUTION INFILTRATION; PERINEURAL PRN
Status: DISCONTINUED | OUTPATIENT
Start: 2022-01-01 | End: 2022-01-01

## 2022-01-01 RX ORDER — HYDROMORPHONE HCL IN WATER/PF 6 MG/30 ML
0.2 PATIENT CONTROLLED ANALGESIA SYRINGE INTRAVENOUS
Status: DISCONTINUED | OUTPATIENT
Start: 2022-01-01 | End: 2022-01-01

## 2022-01-01 RX ORDER — NALOXONE HYDROCHLORIDE 0.4 MG/ML
0.4 INJECTION, SOLUTION INTRAMUSCULAR; INTRAVENOUS; SUBCUTANEOUS
Status: DISCONTINUED | OUTPATIENT
Start: 2022-01-01 | End: 2022-01-01 | Stop reason: HOSPADM

## 2022-01-01 RX ORDER — FENTANYL CITRATE 50 UG/ML
25 INJECTION, SOLUTION INTRAMUSCULAR; INTRAVENOUS EVERY 5 MIN PRN
Status: DISCONTINUED | OUTPATIENT
Start: 2022-01-01 | End: 2022-01-01 | Stop reason: HOSPADM

## 2022-01-01 RX ORDER — KETOROLAC TROMETHAMINE 30 MG/ML
15 INJECTION, SOLUTION INTRAMUSCULAR; INTRAVENOUS
Status: DISCONTINUED | OUTPATIENT
Start: 2022-01-01 | End: 2022-01-01 | Stop reason: HOSPADM

## 2022-01-01 RX ORDER — SULFAMETHOXAZOLE/TRIMETHOPRIM 800-160 MG
1 TABLET ORAL 2 TIMES DAILY
Qty: 8 TABLET | Refills: 0 | Status: SHIPPED | OUTPATIENT
Start: 2022-01-01 | End: 2022-01-01

## 2022-01-01 RX ORDER — RISPERIDONE 1 MG/1
2 TABLET ORAL AT BEDTIME
Status: DISCONTINUED | OUTPATIENT
Start: 2022-01-01 | End: 2022-01-01 | Stop reason: HOSPADM

## 2022-01-01 RX ORDER — AMOXICILLIN 250 MG
1 CAPSULE ORAL 2 TIMES DAILY
Status: DISCONTINUED | OUTPATIENT
Start: 2022-01-01 | End: 2022-01-01 | Stop reason: HOSPADM

## 2022-01-01 RX ORDER — ONDANSETRON 4 MG/1
4 TABLET, ORALLY DISINTEGRATING ORAL EVERY 6 HOURS PRN
Status: DISCONTINUED | OUTPATIENT
Start: 2022-01-01 | End: 2022-01-01 | Stop reason: HOSPADM

## 2022-01-01 RX ORDER — FAMOTIDINE 20 MG/1
20 TABLET, FILM COATED ORAL 2 TIMES DAILY
Status: DISCONTINUED | OUTPATIENT
Start: 2022-01-01 | End: 2022-01-01 | Stop reason: HOSPADM

## 2022-01-01 RX ORDER — HYDROXYZINE HYDROCHLORIDE 10 MG/1
10 TABLET, FILM COATED ORAL EVERY 6 HOURS PRN
Qty: 30 TABLET | Refills: 0 | Status: SHIPPED | OUTPATIENT
Start: 2022-01-01

## 2022-01-01 RX ORDER — CEFAZOLIN SODIUM 1 G/3ML
1 INJECTION, POWDER, FOR SOLUTION INTRAMUSCULAR; INTRAVENOUS EVERY 8 HOURS
Status: COMPLETED | OUTPATIENT
Start: 2022-01-01 | End: 2022-01-01

## 2022-01-01 RX ORDER — HYDRALAZINE HYDROCHLORIDE 20 MG/ML
10 INJECTION INTRAMUSCULAR; INTRAVENOUS EVERY 6 HOURS PRN
Status: DISCONTINUED | OUTPATIENT
Start: 2022-01-01 | End: 2022-01-01 | Stop reason: HOSPADM

## 2022-01-01 RX ORDER — ONDANSETRON 2 MG/ML
4 INJECTION INTRAMUSCULAR; INTRAVENOUS EVERY 30 MIN PRN
Status: DISCONTINUED | OUTPATIENT
Start: 2022-01-01 | End: 2022-01-01 | Stop reason: HOSPADM

## 2022-01-01 RX ORDER — LIDOCAINE 40 MG/G
CREAM TOPICAL
Status: DISCONTINUED | OUTPATIENT
Start: 2022-01-01 | End: 2022-01-01 | Stop reason: HOSPADM

## 2022-01-01 RX ORDER — FERROUS SULFATE 325(65) MG
325 TABLET ORAL DAILY
Status: DISCONTINUED | OUTPATIENT
Start: 2022-01-01 | End: 2022-01-01 | Stop reason: HOSPADM

## 2022-01-01 RX ORDER — FERROUS SULFATE 325(65) MG
325 TABLET ORAL
Qty: 90 TABLET | Refills: 0 | Status: SHIPPED | OUTPATIENT
Start: 2022-01-01

## 2022-01-01 RX ORDER — DIPHENHYDRAMINE HCL 25 MG
25 CAPSULE ORAL
Status: COMPLETED | OUTPATIENT
Start: 2022-01-01 | End: 2022-01-01

## 2022-01-01 RX ORDER — FENTANYL CITRATE 50 UG/ML
50 INJECTION, SOLUTION INTRAMUSCULAR; INTRAVENOUS
Status: COMPLETED | OUTPATIENT
Start: 2022-01-01 | End: 2022-01-01

## 2022-01-01 RX ORDER — BUPIVACAINE HYDROCHLORIDE AND EPINEPHRINE 5; 5 MG/ML; UG/ML
INJECTION, SOLUTION PERINEURAL
Status: COMPLETED | OUTPATIENT
Start: 2022-01-01 | End: 2022-01-01

## 2022-01-01 RX ORDER — DEXAMETHASONE SODIUM PHOSPHATE 4 MG/ML
INJECTION, SOLUTION INTRA-ARTICULAR; INTRALESIONAL; INTRAMUSCULAR; INTRAVENOUS; SOFT TISSUE PRN
Status: DISCONTINUED | OUTPATIENT
Start: 2022-01-01 | End: 2022-01-01

## 2022-01-01 RX ORDER — CARBIDOPA AND LEVODOPA 50; 200 MG/1; MG/1
1 TABLET, EXTENDED RELEASE ORAL
Status: DISCONTINUED | OUTPATIENT
Start: 2022-01-01 | End: 2022-01-01 | Stop reason: HOSPADM

## 2022-01-01 RX ORDER — PROPOFOL 10 MG/ML
INJECTION, EMULSION INTRAVENOUS CONTINUOUS PRN
Status: DISCONTINUED | OUTPATIENT
Start: 2022-01-01 | End: 2022-01-01

## 2022-01-01 RX ORDER — BUTALBITAL, ASPIRIN, AND CAFFEINE 325; 50; 40 MG/1; MG/1; MG/1
1 CAPSULE ORAL EVERY 6 HOURS PRN
Status: DISCONTINUED | OUTPATIENT
Start: 2022-01-01 | End: 2022-01-01

## 2022-01-01 RX ORDER — OXYBUTYNIN CHLORIDE 10 MG/1
TABLET, EXTENDED RELEASE ORAL
Qty: 90 TABLET | Refills: 0 | Status: SHIPPED | OUTPATIENT
Start: 2022-01-01

## 2022-01-01 RX ORDER — GABAPENTIN 100 MG/1
200 CAPSULE ORAL AT BEDTIME
COMMUNITY
Start: 2022-01-01

## 2022-01-01 RX ORDER — ACETAMINOPHEN 325 MG/1
650 TABLET ORAL EVERY 4 HOURS PRN
Qty: 100 TABLET | Refills: 0 | Status: SHIPPED | OUTPATIENT
Start: 2022-01-01

## 2022-01-01 RX ORDER — HYDROMORPHONE HYDROCHLORIDE 2 MG/1
1 TABLET ORAL
Qty: 24 TABLET | Refills: 0 | Status: SHIPPED | OUTPATIENT
Start: 2022-01-01

## 2022-01-01 RX ORDER — ONDANSETRON 2 MG/ML
4 INJECTION INTRAMUSCULAR; INTRAVENOUS EVERY 6 HOURS PRN
Status: DISCONTINUED | OUTPATIENT
Start: 2022-01-01 | End: 2022-01-01 | Stop reason: HOSPADM

## 2022-01-01 RX ORDER — HALOPERIDOL 5 MG/ML
1 INJECTION INTRAMUSCULAR
Status: DISCONTINUED | OUTPATIENT
Start: 2022-01-01 | End: 2022-01-01 | Stop reason: HOSPADM

## 2022-01-01 RX ORDER — ONDANSETRON 2 MG/ML
INJECTION INTRAMUSCULAR; INTRAVENOUS PRN
Status: DISCONTINUED | OUTPATIENT
Start: 2022-01-01 | End: 2022-01-01

## 2022-01-01 RX ORDER — PHENOL 1.4 %
10 AEROSOL, SPRAY (ML) MUCOUS MEMBRANE AT BEDTIME
COMMUNITY

## 2022-01-01 RX ORDER — FAMOTIDINE 20 MG/1
20 TABLET, FILM COATED ORAL 2 TIMES DAILY
Qty: 180 TABLET | Refills: 0 | Status: SHIPPED | OUTPATIENT
Start: 2022-01-01

## 2022-01-01 RX ORDER — CEFAZOLIN SODIUM/WATER 2 G/20 ML
2 SYRINGE (ML) INTRAVENOUS SEE ADMIN INSTRUCTIONS
Status: DISCONTINUED | OUTPATIENT
Start: 2022-01-01 | End: 2022-01-01 | Stop reason: HOSPADM

## 2022-01-01 RX ORDER — SULFAMETHOXAZOLE/TRIMETHOPRIM 800-160 MG
1 TABLET ORAL 2 TIMES DAILY
Qty: 6 TABLET | Refills: 0 | Status: SHIPPED | OUTPATIENT
Start: 2022-01-01 | End: 2022-01-01

## 2022-01-01 RX ORDER — ONDANSETRON 4 MG/1
4 TABLET, ORALLY DISINTEGRATING ORAL EVERY 30 MIN PRN
Status: DISCONTINUED | OUTPATIENT
Start: 2022-01-01 | End: 2022-01-01 | Stop reason: HOSPADM

## 2022-01-01 RX ORDER — ASPIRIN 81 MG/1
81 TABLET ORAL 2 TIMES DAILY
Qty: 60 TABLET | Refills: 0 | COMMUNITY
Start: 2022-01-01 | End: 2022-01-01

## 2022-01-01 RX ORDER — ACETAMINOPHEN 325 MG/1
650 TABLET ORAL EVERY 4 HOURS PRN
Status: DISCONTINUED | OUTPATIENT
Start: 2022-01-01 | End: 2022-01-01 | Stop reason: HOSPADM

## 2022-01-01 RX ORDER — CEFAZOLIN SODIUM/WATER 2 G/20 ML
2 SYRINGE (ML) INTRAVENOUS
Status: COMPLETED | OUTPATIENT
Start: 2022-01-01 | End: 2022-01-01

## 2022-01-01 RX ORDER — ASPIRIN 81 MG/1
81 TABLET ORAL 2 TIMES DAILY
Status: DISCONTINUED | OUTPATIENT
Start: 2022-01-01 | End: 2022-01-01

## 2022-01-01 RX ORDER — HYDROXYZINE HYDROCHLORIDE 10 MG/1
10 TABLET, FILM COATED ORAL EVERY 6 HOURS PRN
Status: DISCONTINUED | OUTPATIENT
Start: 2022-01-01 | End: 2022-01-01 | Stop reason: HOSPADM

## 2022-01-01 RX ORDER — BISACODYL 10 MG
10 SUPPOSITORY, RECTAL RECTAL DAILY PRN
Status: DISCONTINUED | OUTPATIENT
Start: 2022-01-01 | End: 2022-01-01 | Stop reason: HOSPADM

## 2022-01-01 RX ORDER — FLUOXETINE 40 MG/1
40 CAPSULE ORAL DAILY
COMMUNITY

## 2022-01-01 RX ADMIN — ACETAMINOPHEN 975 MG: 325 TABLET ORAL at 06:49

## 2022-01-01 RX ADMIN — ACETAMINOPHEN 975 MG: 325 TABLET ORAL at 07:01

## 2022-01-01 RX ADMIN — FLUOXETINE HYDROCHLORIDE 40 MG: 20 CAPSULE ORAL at 09:04

## 2022-01-01 RX ADMIN — ACETAMINOPHEN 975 MG: 325 TABLET ORAL at 06:23

## 2022-01-01 RX ADMIN — RISPERIDONE 2 MG: 1 TABLET ORAL at 20:00

## 2022-01-01 RX ADMIN — FERROUS SULFATE TAB 325 MG (65 MG ELEMENTAL FE) 325 MG: 325 (65 FE) TAB at 09:18

## 2022-01-01 RX ADMIN — SODIUM CHLORIDE 1000 ML: 9 INJECTION, SOLUTION INTRAVENOUS at 13:59

## 2022-01-01 RX ADMIN — FLUOXETINE 10 MG: 10 CAPSULE ORAL at 09:07

## 2022-01-01 RX ADMIN — ACETAMINOPHEN 650 MG: 325 TABLET ORAL at 09:04

## 2022-01-01 RX ADMIN — FENTANYL CITRATE 25 MCG: 50 INJECTION, SOLUTION INTRAMUSCULAR; INTRAVENOUS at 10:21

## 2022-01-01 RX ADMIN — OXYCODONE HYDROCHLORIDE 5 MG: 5 TABLET ORAL at 08:02

## 2022-01-01 RX ADMIN — POLYETHYLENE GLYCOL 3350 17 G: 17 POWDER, FOR SOLUTION ORAL at 09:18

## 2022-01-01 RX ADMIN — OXYCODONE HYDROCHLORIDE 5 MG: 5 TABLET ORAL at 09:18

## 2022-01-01 RX ADMIN — ACETAMINOPHEN 975 MG: 325 TABLET ORAL at 06:40

## 2022-01-01 RX ADMIN — OXYCODONE HYDROCHLORIDE 5 MG: 5 TABLET ORAL at 18:21

## 2022-01-01 RX ADMIN — CARBIDOPA AND LEVODOPA 1 TABLET: 50; 200 TABLET, EXTENDED RELEASE ORAL at 12:06

## 2022-01-01 RX ADMIN — FAMOTIDINE 20 MG: 20 TABLET ORAL at 20:02

## 2022-01-01 RX ADMIN — OXYCODONE HYDROCHLORIDE 5 MG: 5 TABLET ORAL at 04:54

## 2022-01-01 RX ADMIN — HYDROMORPHONE HYDROCHLORIDE 0.2 MG: 0.2 INJECTION, SOLUTION INTRAMUSCULAR; INTRAVENOUS; SUBCUTANEOUS at 10:31

## 2022-01-01 RX ADMIN — CARBIDOPA AND LEVODOPA 1 TABLET: 50; 200 TABLET, EXTENDED RELEASE ORAL at 17:11

## 2022-01-01 RX ADMIN — HYDROMORPHONE HYDROCHLORIDE 1 MG: 2 TABLET ORAL at 19:30

## 2022-01-01 RX ADMIN — FERROUS SULFATE TAB 325 MG (65 MG ELEMENTAL FE) 325 MG: 325 (65 FE) TAB at 09:59

## 2022-01-01 RX ADMIN — HYDROMORPHONE HYDROCHLORIDE 0.2 MG: 0.2 INJECTION, SOLUTION INTRAMUSCULAR; INTRAVENOUS; SUBCUTANEOUS at 19:18

## 2022-01-01 RX ADMIN — SENNOSIDES AND DOCUSATE SODIUM 1 TABLET: 50; 8.6 TABLET ORAL at 09:03

## 2022-01-01 RX ADMIN — Medication 10 MG: at 00:02

## 2022-01-01 RX ADMIN — ACETAMINOPHEN 650 MG: 325 TABLET ORAL at 22:26

## 2022-01-01 RX ADMIN — DIPHENHYDRAMINE HYDROCHLORIDE 25 MG: 25 CAPSULE ORAL at 16:06

## 2022-01-01 RX ADMIN — HYDROMORPHONE HYDROCHLORIDE 1 MG: 2 TABLET ORAL at 16:01

## 2022-01-01 RX ADMIN — FLUOXETINE HYDROCHLORIDE 40 MG: 20 CAPSULE ORAL at 08:02

## 2022-01-01 RX ADMIN — HYDROXYZINE HYDROCHLORIDE 10 MG: 10 TABLET ORAL at 10:31

## 2022-01-01 RX ADMIN — GABAPENTIN 200 MG: 100 CAPSULE ORAL at 20:00

## 2022-01-01 RX ADMIN — MAGNESIUM HYDROXIDE 30 ML: 400 SUSPENSION ORAL at 20:07

## 2022-01-01 RX ADMIN — CARBIDOPA AND LEVODOPA 1 TABLET: 50; 200 TABLET, EXTENDED RELEASE ORAL at 11:36

## 2022-01-01 RX ADMIN — SODIUM CHLORIDE, POTASSIUM CHLORIDE, SODIUM LACTATE AND CALCIUM CHLORIDE: 600; 310; 30; 20 INJECTION, SOLUTION INTRAVENOUS at 13:53

## 2022-01-01 RX ADMIN — RIVAROXABAN 10 MG: 10 TABLET, FILM COATED ORAL at 16:33

## 2022-01-01 RX ADMIN — Medication 2 G: at 07:32

## 2022-01-01 RX ADMIN — SENNOSIDES AND DOCUSATE SODIUM 1 TABLET: 50; 8.6 TABLET ORAL at 20:01

## 2022-01-01 RX ADMIN — FAMOTIDINE 20 MG: 20 TABLET ORAL at 09:08

## 2022-01-01 RX ADMIN — SODIUM CHLORIDE, POTASSIUM CHLORIDE, SODIUM LACTATE AND CALCIUM CHLORIDE: 600; 310; 30; 20 INJECTION, SOLUTION INTRAVENOUS at 22:38

## 2022-01-01 RX ADMIN — FAMOTIDINE 20 MG: 20 TABLET ORAL at 20:01

## 2022-01-01 RX ADMIN — SENNOSIDES AND DOCUSATE SODIUM 1 TABLET: 50; 8.6 TABLET ORAL at 20:02

## 2022-01-01 RX ADMIN — SENNOSIDES AND DOCUSATE SODIUM 1 TABLET: 50; 8.6 TABLET ORAL at 09:07

## 2022-01-01 RX ADMIN — GABAPENTIN 200 MG: 100 CAPSULE ORAL at 22:25

## 2022-01-01 RX ADMIN — ACETAMINOPHEN 975 MG: 325 TABLET ORAL at 23:50

## 2022-01-01 RX ADMIN — RIVAROXABAN 10 MG: 10 TABLET, FILM COATED ORAL at 16:06

## 2022-01-01 RX ADMIN — AMITRIPTYLINE HYDROCHLORIDE 150 MG: 50 TABLET, FILM COATED ORAL at 20:01

## 2022-01-01 RX ADMIN — CARBIDOPA AND LEVODOPA 1 TABLET: 50; 200 TABLET, EXTENDED RELEASE ORAL at 16:06

## 2022-01-01 RX ADMIN — ACETAMINOPHEN 975 MG: 325 TABLET ORAL at 00:01

## 2022-01-01 RX ADMIN — POLYETHYLENE GLYCOL 3350 17 G: 17 POWDER, FOR SOLUTION ORAL at 09:06

## 2022-01-01 RX ADMIN — FLUOXETINE 10 MG: 10 CAPSULE ORAL at 08:02

## 2022-01-01 RX ADMIN — ACETAMINOPHEN 975 MG: 325 TABLET ORAL at 23:57

## 2022-01-01 RX ADMIN — FLUOXETINE HYDROCHLORIDE 40 MG: 20 CAPSULE ORAL at 09:07

## 2022-01-01 RX ADMIN — SENNOSIDES AND DOCUSATE SODIUM 1 TABLET: 50; 8.6 TABLET ORAL at 09:19

## 2022-01-01 RX ADMIN — CEFAZOLIN 1 G: 1 INJECTION, POWDER, FOR SOLUTION INTRAMUSCULAR; INTRAVENOUS at 00:02

## 2022-01-01 RX ADMIN — ASPIRIN 81 MG: 81 TABLET, COATED ORAL at 11:22

## 2022-01-01 RX ADMIN — HYDROMORPHONE HYDROCHLORIDE 0.2 MG: 0.2 INJECTION, SOLUTION INTRAMUSCULAR; INTRAVENOUS; SUBCUTANEOUS at 15:10

## 2022-01-01 RX ADMIN — RIVAROXABAN 10 MG: 10 TABLET, FILM COATED ORAL at 17:11

## 2022-01-01 RX ADMIN — MEPIVACAINE HYDROCHLORIDE 2.5 ML: 15 INJECTION, SOLUTION EPIDURAL; INFILTRATION at 07:44

## 2022-01-01 RX ADMIN — FAMOTIDINE 20 MG: 20 TABLET ORAL at 20:00

## 2022-01-01 RX ADMIN — SODIUM CHLORIDE, POTASSIUM CHLORIDE, SODIUM LACTATE AND CALCIUM CHLORIDE: 600; 310; 30; 20 INJECTION, SOLUTION INTRAVENOUS at 06:31

## 2022-01-01 RX ADMIN — CARBIDOPA AND LEVODOPA 1 TABLET: 50; 200 TABLET, EXTENDED RELEASE ORAL at 09:03

## 2022-01-01 RX ADMIN — OXYCODONE HYDROCHLORIDE 2.5 MG: 5 TABLET ORAL at 12:35

## 2022-01-01 RX ADMIN — HYDROMORPHONE HYDROCHLORIDE 1 MG: 2 TABLET ORAL at 12:35

## 2022-01-01 RX ADMIN — SODIUM CHLORIDE, POTASSIUM CHLORIDE, SODIUM LACTATE AND CALCIUM CHLORIDE: 600; 310; 30; 20 INJECTION, SOLUTION INTRAVENOUS at 23:57

## 2022-01-01 RX ADMIN — BUPIVACAINE HYDROCHLORIDE AND EPINEPHRINE BITARTRATE 15 ML: 5; .005 INJECTION, SOLUTION PERINEURAL at 07:00

## 2022-01-01 RX ADMIN — FENTANYL CITRATE 25 MCG: 50 INJECTION, SOLUTION INTRAMUSCULAR; INTRAVENOUS at 09:58

## 2022-01-01 RX ADMIN — ONDANSETRON 4 MG: 2 INJECTION INTRAMUSCULAR; INTRAVENOUS at 08:00

## 2022-01-01 RX ADMIN — BISACODYL 10 MG: 10 SUPPOSITORY RECTAL at 22:07

## 2022-01-01 RX ADMIN — GABAPENTIN 200 MG: 100 CAPSULE ORAL at 20:02

## 2022-01-01 RX ADMIN — RIVAROXABAN 10 MG: 10 TABLET, FILM COATED ORAL at 16:46

## 2022-01-01 RX ADMIN — FAMOTIDINE 20 MG: 20 TABLET ORAL at 09:03

## 2022-01-01 RX ADMIN — ACETAMINOPHEN 650 MG: 325 TABLET ORAL at 16:06

## 2022-01-01 RX ADMIN — FENTANYL CITRATE 50 MCG: 50 INJECTION, SOLUTION INTRAMUSCULAR; INTRAVENOUS at 06:55

## 2022-01-01 RX ADMIN — CARBIDOPA AND LEVODOPA 1 TABLET: 50; 200 TABLET, EXTENDED RELEASE ORAL at 06:40

## 2022-01-01 RX ADMIN — ACETAMINOPHEN 975 MG: 325 TABLET ORAL at 15:05

## 2022-01-01 RX ADMIN — ACETAMINOPHEN 975 MG: 325 TABLET ORAL at 13:57

## 2022-01-01 RX ADMIN — POLYETHYLENE GLYCOL 3350 17 G: 17 POWDER, FOR SOLUTION ORAL at 08:03

## 2022-01-01 RX ADMIN — CARBIDOPA AND LEVODOPA 1 TABLET: 50; 200 TABLET, EXTENDED RELEASE ORAL at 07:01

## 2022-01-01 RX ADMIN — CARBIDOPA AND LEVODOPA 1 TABLET: 50; 200 TABLET, EXTENDED RELEASE ORAL at 06:48

## 2022-01-01 RX ADMIN — FLUOXETINE 10 MG: 10 CAPSULE ORAL at 10:30

## 2022-01-01 RX ADMIN — CEFAZOLIN 1 G: 1 INJECTION, POWDER, FOR SOLUTION INTRAMUSCULAR; INTRAVENOUS at 16:34

## 2022-01-01 RX ADMIN — SENNOSIDES AND DOCUSATE SODIUM 1 TABLET: 50; 8.6 TABLET ORAL at 08:02

## 2022-01-01 RX ADMIN — ASPIRIN 81 MG: 81 TABLET, COATED ORAL at 20:01

## 2022-01-01 RX ADMIN — LIDOCAINE HYDROCHLORIDE 3 ML: 10 INJECTION, SOLUTION INFILTRATION; PERINEURAL at 07:54

## 2022-01-01 RX ADMIN — ACETAMINOPHEN 975 MG: 325 TABLET ORAL at 15:10

## 2022-01-01 RX ADMIN — TRANEXAMIC ACID 1950 MG: 650 TABLET ORAL at 06:23

## 2022-01-01 RX ADMIN — GABAPENTIN 200 MG: 100 CAPSULE ORAL at 20:01

## 2022-01-01 RX ADMIN — FLUOXETINE 10 MG: 10 CAPSULE ORAL at 09:05

## 2022-01-01 RX ADMIN — OXYCODONE HYDROCHLORIDE 2.5 MG: 5 TABLET ORAL at 00:11

## 2022-01-01 RX ADMIN — HYDROMORPHONE HYDROCHLORIDE 0.2 MG: 0.2 INJECTION, SOLUTION INTRAMUSCULAR; INTRAVENOUS; SUBCUTANEOUS at 17:11

## 2022-01-01 RX ADMIN — AMITRIPTYLINE HYDROCHLORIDE 150 MG: 50 TABLET, FILM COATED ORAL at 20:00

## 2022-01-01 RX ADMIN — FENTANYL CITRATE 50 MCG: 50 INJECTION, SOLUTION INTRAMUSCULAR; INTRAVENOUS at 07:40

## 2022-01-01 RX ADMIN — CARBIDOPA AND LEVODOPA 1 TABLET: 50; 200 TABLET, EXTENDED RELEASE ORAL at 17:03

## 2022-01-01 RX ADMIN — FAMOTIDINE 20 MG: 20 TABLET ORAL at 22:24

## 2022-01-01 RX ADMIN — RISPERIDONE 2 MG: 1 TABLET ORAL at 20:02

## 2022-01-01 RX ADMIN — SENNOSIDES AND DOCUSATE SODIUM 1 TABLET: 50; 8.6 TABLET ORAL at 20:00

## 2022-01-01 RX ADMIN — OXYCODONE HYDROCHLORIDE 2.5 MG: 5 TABLET ORAL at 17:05

## 2022-01-01 RX ADMIN — OXYCODONE HYDROCHLORIDE 5 MG: 5 TABLET ORAL at 13:58

## 2022-01-01 RX ADMIN — FERROUS SULFATE TAB 325 MG (65 MG ELEMENTAL FE) 325 MG: 325 (65 FE) TAB at 09:03

## 2022-01-01 RX ADMIN — RISPERIDONE 2 MG: 1 TABLET ORAL at 22:24

## 2022-01-01 RX ADMIN — AMITRIPTYLINE HYDROCHLORIDE 150 MG: 50 TABLET, FILM COATED ORAL at 20:02

## 2022-01-01 RX ADMIN — PROPOFOL 100 MCG/KG/MIN: 10 INJECTION, EMULSION INTRAVENOUS at 07:46

## 2022-01-01 RX ADMIN — CARBIDOPA AND LEVODOPA 1 TABLET: 50; 200 TABLET, EXTENDED RELEASE ORAL at 12:32

## 2022-01-01 RX ADMIN — HYDROXYZINE HYDROCHLORIDE 10 MG: 10 TABLET ORAL at 18:22

## 2022-01-01 RX ADMIN — FENTANYL CITRATE 25 MCG: 50 INJECTION, SOLUTION INTRAMUSCULAR; INTRAVENOUS at 09:51

## 2022-01-01 RX ADMIN — FERROUS SULFATE TAB 325 MG (65 MG ELEMENTAL FE) 325 MG: 325 (65 FE) TAB at 09:07

## 2022-01-01 RX ADMIN — HYDROMORPHONE HYDROCHLORIDE 1 MG: 2 TABLET ORAL at 14:19

## 2022-01-01 RX ADMIN — FENTANYL CITRATE 25 MCG: 50 INJECTION, SOLUTION INTRAMUSCULAR; INTRAVENOUS at 10:07

## 2022-01-01 RX ADMIN — ASPIRIN 81 MG: 81 TABLET, COATED ORAL at 08:02

## 2022-01-01 RX ADMIN — CARBIDOPA AND LEVODOPA 1 TABLET: 50; 200 TABLET, EXTENDED RELEASE ORAL at 12:28

## 2022-01-01 RX ADMIN — SENNOSIDES AND DOCUSATE SODIUM 1 TABLET: 50; 8.6 TABLET ORAL at 11:22

## 2022-01-01 RX ADMIN — FAMOTIDINE 20 MG: 20 TABLET ORAL at 08:02

## 2022-01-01 RX ADMIN — DEXAMETHASONE SODIUM PHOSPHATE 8 MG: 4 INJECTION, SOLUTION INTRA-ARTICULAR; INTRALESIONAL; INTRAMUSCULAR; INTRAVENOUS; SOFT TISSUE at 07:54

## 2022-01-01 RX ADMIN — CARBIDOPA AND LEVODOPA 1 TABLET: 50; 200 TABLET, EXTENDED RELEASE ORAL at 16:45

## 2022-01-01 RX ADMIN — FAMOTIDINE 20 MG: 20 TABLET ORAL at 09:19

## 2022-01-01 RX ADMIN — RISPERIDONE 2 MG: 1 TABLET ORAL at 20:01

## 2022-01-01 RX ADMIN — FLUOXETINE HYDROCHLORIDE 40 MG: 20 CAPSULE ORAL at 09:19

## 2022-01-01 ASSESSMENT — ACTIVITIES OF DAILY LIVING (ADL)
ADLS_ACUITY_SCORE: 14
ADLS_ACUITY_SCORE: 16
ADLS_ACUITY_SCORE: 14
ADLS_ACUITY_SCORE: 16
ADLS_ACUITY_SCORE: 16
ADLS_ACUITY_SCORE: 14
ADLS_ACUITY_SCORE: 14
ADLS_ACUITY_SCORE: 16
ADLS_ACUITY_SCORE: 16
ADLS_ACUITY_SCORE: 14
DEPENDENT_IADLS:: CLEANING;TRANSPORTATION
ADLS_ACUITY_SCORE: 16
ADLS_ACUITY_SCORE: 14
ADLS_ACUITY_SCORE: 14
ADLS_ACUITY_SCORE: 16
ADLS_ACUITY_SCORE: 14
ADLS_ACUITY_SCORE: 14
ADLS_ACUITY_SCORE: 16
ADLS_ACUITY_SCORE: 14
ADLS_ACUITY_SCORE: 14
ADLS_ACUITY_SCORE: 16

## 2022-01-01 ASSESSMENT — PATIENT HEALTH QUESTIONNAIRE - PHQ9: SUM OF ALL RESPONSES TO PHQ QUESTIONS 1-9: 0

## 2022-01-01 ASSESSMENT — PAIN SCALES - GENERAL
PAINLEVEL: NO PAIN (1)
PAINLEVEL: SEVERE PAIN (6)

## 2022-01-19 NOTE — TELEPHONE ENCOUNTER
RN attempted to call patient, no answer, no option to leave .     Will need to triage symptoms and likely schedule a visit. Last seen for UTI, BV and yeast infection 12/2/21 with Dr. Candelaria.     Will need to clarify if patient took Diflucan and metronidazole prescriptions sent from that visit (see 12/7 TE).     Cierra Fischer, RN, BSN, PHN  [facility]: [place]

## 2022-01-19 NOTE — TELEPHONE ENCOUNTER
Pt is calling with yeast infection symptoms. States she just finished a course of antibiotics and is experiencing symptoms; requesting diflucan.     -burning when urination, smells, uncomfortable x 1 weeks    [facility]     [phone] ok to lm    Samia Arenas/  [place]

## 2022-01-20 NOTE — PROGRESS NOTES
Laurie is a 81 year old who is being evaluated via a billable telephone visit.      What phone number would you like to be contacted at? [phone]  How would you like to obtain your AVS? MyChart    Assessment & Plan     Dysuria  Recommend obtaining testing first with UA and wet prep since her symptoms could be consistent with both UTI, yeast or BV. Given her recent antibiotic use for her dental procedure, suspect yeast infection. However endorses severe dysuria. On her last visit, she was positive for both BV and yeast. She will see if she can get a ride to the clinic and will call to schedule a lab appointment.     She does not see her [employer] messages**  - UA Macro with Reflex to Micro and Culture - lab collect; Future    Vaginal odor  - Wet prep - Clinic Collect      Return in about 1 week (around 1/27/2022), or if symptoms worsen or fail to improve.    Abimbola Candelaria [facility]   Laurie is a 81 year old who presents for the following health issues     HPI     Genitourinary - Female  Onset/Duration:  Couple days- maybe a week  Description:   Painful urination (Dysuria): YES and burning           Frequency: no  Blood in urine (Hematuria): no  Delay in urine (Hesitency): YES a little  Intensity: mild  Progression of Symptoms:  same  Accompanying Signs & Symptoms:  Fever/chills: no  Flank pain: no  Nausea and vomiting: no  Vaginal symptoms: odor and discharge  Abdominal/Pelvic Pain: no  History:   History of frequent UTI s: YES in the past  History of kidney stones: no  Sexually Active: no  Possibility of pregnancy: No  Precipitating or alleviating factors: Yes- possibly from antibiotics she got from dentist a week or two ago  Therapies tried and outcome:   None- treated  with bactrim and metronidazole/Fluconazole in Dec- symptoms had resolved and then came back again recently after finishing antibiotics from her dentist    She is concerned she has a yeast  infection.   Denies vaginal itching.   Endorses severe burning when she voids. This started a couple days after her recent dentist appt that she had to take antibiotics for.   No hematuria  Endorses fatigue  No fever    She does not see her messages on [employer].   She doesn't drive            Review of Systems   Constitutional, HEENT, cardiovascular, pulmonary, gi and gu systems are negative, except as otherwise noted.      Objective           Vitals:  No vitals were obtained today due to virtual visit.    Physical Exam   healthy, alert and no distress  PSYCH: Alert and oriented times 3; coherent speech, normal   rate and volume, able to articulate logical thoughts, able   to abstract reason, no tangential thoughts, no hallucinations   or delusions  Her affect is normal  RESP: No cough, no audible wheezing, able to talk in full sentences  Remainder of exam unable to be completed due to telephone visits        Phone call duration: 7 minutes

## 2022-01-20 NOTE — PATIENT INSTRUCTIONS
"Once we get your lab results, I will call  In the appropriate medication.     Call to schedule a \"lab only appointment\" in the next 1-2 days      Patient Education        Patient Education     Dysuria  Dysuria is when you have pain during urination. It is often described as a burning feeling. Learn more about this problem and how it can be treated.     Painful urination (dysuria) is often caused by a problem in the urinary tract.   What causes dysuria?  Possible causes include:    Infection with a bacteria or virus such as a urinary tract infection (UTI) or a sexually transmitted infection (STI)    Sensitivity or allergy to chemicals such as those found in lotions and other products    Prostate or bladder problems    Radiation therapy to the pelvic area  How is dysuria diagnosed?  Your healthcare provider will examine you. He or she will ask about your symptoms and health. After talking with you and doing a physical exam, your healthcare provider may know what is causing your dysuria. You will often have to give a urine sample. Tests of your urine (urinalysis) are done. A urinalysis may include:    Looking at the urine sample (visual exam)    Checking for substances (chemical exam)    Looking at a small amount of the urine under a microscope (microscopic exam)  Some parts of the urinalysis may be done in the provider's office and some in a lab. The urine sample may also be checked for bacteria and yeast (urine culture). Your healthcare provider will tell you more about these tests if they are needed.  How is dysuria treated?  Treatment depends on the cause. If you have a bacterial infection, you may need antibiotics. You may be given medicines to make it easier for you to urinate and help ease pain. Your healthcare provider can tell you more about your treatment options. If not treated, symptoms may get worse.  When to call your healthcare provider  Call the healthcare provider right away if you have any of the " following:    Fever of  100.4  F ( 38 C) or higher, or as directed by your provider    No improvement after 3 days of treatment    Trouble urinating because of pain    New or increased discharge from the vagina or penis    Rash or joint pain    Increased back or belly pain    Enlarged painful lymph nodes (lumps) in the groin  [employer] last reviewed this educational content on 4/1/2019 2000-2021 [employer]. All rights reserved. This information is not intended as a substitute for professional medical care. Always follow your healthcare professional's instructions.           Patient Education     Yeast Infection (Candida Vaginal Infection)    You have a Candida vaginal infection. This is also known as a yeast infection. It's most often caused by a type of yeast (fungus) called Candida. Candida are normally found in the vagina. But if they increase in number, this can lead to infection and cause symptoms.   Symptoms of a yeast infection can include:     Clumpy or thin, white discharge, which may look like cottage cheese    Itching or burning    Burning with urination  Certain factors can make a yeast infection more likely. These can include:     Taking certain medicines, such as antibiotics or birth control pills    Pregnancy    Diabetes    Weak immune system  A yeast infection is most often treated with antifungal medicine. This may be given as a vaginal cream or pills you take by mouth. Treatment may last for about 1 to 7 days. Women with severe or recurrent infections may need longer courses of treatment.   Home care    If you re prescribed medicine, be sure to use it as directed. Finish all of the medicine, even if your symptoms go away. Don t try to treat yourself using over-the-counter products without talking with your provider first. They will let you know if this is a good option for you.    Ask your provider what steps you can take to help reduce your risk of having a yeast infection in the  future.    Follow-up care  Follow up with your healthcare provider, or as directed.   When to seek medical advice  Call your healthcare provider right away if:     You have a fever of 100.4 F (38 C) or higher, or as directed by your provider.    Your symptoms worsen, or they don t go away within a few days of starting treatment.    You have new pain in the lower belly or pelvic region.    You have side effects that bother you or a reaction to the cream or pills you re prescribed.    You or any partners you have sex with have new symptoms, such as a rash, joint pain, or sores.  [employer] last reviewed this educational content on 7/1/2020 2000-2021 [employer]. All rights reserved. This information is not intended as a substitute for professional medical care. Always follow your healthcare professional's instructions.

## 2022-01-20 NOTE — TELEPHONE ENCOUNTER
RN spoke with patient.     She doesn't remember if she took the Diflucan or Flagyl prescribed from last visit. She continues to have burning and itching.     RN scheduled virtual visit with Dr. Candelaria today to address.     Cierra Fischer RN, BSN, PHN  [facility]: [place]

## 2022-01-25 NOTE — TELEPHONE ENCOUNTER
Routing to Dr. Candelaria    Patient continues with burning on Urination.  Has noticed little improvement.  Denied fever, vaginal discharge, odor    Patient offered appointment but patient does not drive.    Patient hoping provider will extend antibiotic.      Virtual visit     Dysuria  Recommend obtaining testing first with UA and wet prep since her symptoms could be consistent with both UTI, yeast or BV. Given her recent antibiotic use for her dental procedure, suspect yeast infection. However endorses severe dysuria. On her last visit, she was positive for both BV and yeast. She will see if she can get a ride to the clinic and will call to schedule a lab appointment.      She does not see her [employer] messages**  - UA Macro with Reflex to Micro and Culture - lab collect; Future

## 2022-01-25 NOTE — TELEPHONE ENCOUNTER
Called and reviewed providers recommendations with pt. Notified pt that after this rx if her symptoms are not improved we would recommend follow up visit to reassess. Pt verbalized an understanding.    Kathryn Trejo RN

## 2022-01-25 NOTE — TELEPHONE ENCOUNTER
We did a 3 day course before, so I will send in 4 more days for a total of 7 days, rx sent to her pharmacy. If symptoms aren't better after this, then recommend retesting urine/re-evaluation.     Abimbola Candelaria, DO

## 2022-02-18 NOTE — TELEPHONE ENCOUNTER
Pt is calling to report to her PCP that she had hepatitis C a couple months ago, and is wondering if this has to do with her bladder infections?    Call  ok to marco Arenas/  [place]

## 2022-02-18 NOTE — PATIENT INSTRUCTIONS
Start the vaginal estrogen and use twice a week to see if that helps with irritation.    Also try to cut out the diet Pepsi, sugary foods and lunch meats as these things could be causing bladder irritation    We will wait for the urine culture to determine if you need antibiotics    Expect a MyChart message early next week with your urine culture results    It was nice meeting you today    If you get a chance could be complete a survey if you are contacted    Danita Iqbal DO      BLADDER IRRITANTS    Many people who have bladder problems reduce the amount of liquid that they  consume hoping to urinate less often. Actually, reducing your fluid intake causes  concentration urine. Concentrated urine can be irritating to the bladder lining  increasing symptoms. Decreasing your fluid may also encourage the growth of  bacteria leading to urinary tract infections.      Certain foods and beverages are thought to contribute to bladder problems and  are classified as possible bladder irritants. The reasons are not always  understood. You may wish to eliminate some of the following to see if your  symptoms improve. Eliminate one at a time for at least a week to see how it  affects your symptoms.        Coffee, tea - even decaffeinated (herbal teas or okay)    Caffeine - soda, coffee, tea, medicines containing caffeine (Excedrin)    Alcoholic beverages    Carbonated beverages    Citrus fruits and juices - oranges, grapefruit, cranberries    Tomatoes and tomato based products    Spicy foods    Aged cheeses    Canned, cured, smoked, and processes meats    Sugar, honey, corn syrup    Chocolate - it contains caffeine    Artificial sweeteners (NutraSweet) - especially in diet soda    Water is the best beverage to drink. Other non-irritating choices include herbal  tea, milk, apple juice, grape juice, pear and peach nectar. Cottage cheese,  blueberries, melons, white chocolate, pasta, rice, potatoes, most breads, poultry,  fish, and  garlic are generally thought not to cause bladder irritation.    Remember that the above lists of foods are only possible bladder irritants.  You may find that many of these foods do not affect your bladder symptoms.    Danita Iqbal D.O.

## 2022-02-18 NOTE — PROGRESS NOTES
Assessment & Plan     Dysuria    The patient has had dysuria for the last 2 months with equivocal urinalyses and negative cultures.  Today her dip looks like an infection however will wait for culture to treat with antibiotics.  I have given her a list of common bladder irritants to avoid.  We will treat her vaginal atrophy to see if this helps with the discomfort.    - UA Macro with Reflex to Micro and Culture - lab collect; Future  - UA Macro with Reflex to Micro and Culture - lab collect  - Urine Microscopic Exam  - Urine Culture    Vaginal atrophy  Could be related to the dysuria we will treat  - conjugated estrogens (PREMARIN) 0.625 MG/GM vaginal cream; Place 1 g vaginally twice a week    Bladder irritation  Bladder irritants reviewed    Parkinson's disease (H)  Under the care of neurology for this    Schizoaffective disorder, chronic condition (H)  Stable on medications    Pulmonary embolism and infarction (H)  The patient is no longer on a blood thinner    Chronic hepatitis C without hepatic coma (H)  Stable        30 minutes spent on the date of the encounter doing chart review, history and exam, documentation and further activities per the note         No follow-ups on file.    Danita Iqbal [facility]    Kimberly Sullivan is a 81 year old who presents for the following health issues  accompanied by her friend.    HPI     Genitourinary - Female  Onset/Duration: 5-6 weeks ago, did a virtual visit with Dr. Candelaria on 01/20/22  Description:   Painful urination (Dysuria): YES, burning           Frequency: YES  Blood in urine (Hematuria): no  Delay in urine (Hesitency): YES  Intensity: moderate  Progression of Symptoms:  worsening  Accompanying Signs & Symptoms:  Fever/chills: YES, chills  Flank pain: some left lower back pain 1-2/10  Nausea and vomiting: no  Vaginal symptoms: discharge and odor  Abdominal/Pelvic Pain: YES, pelvic pain in the middle   History:   History of  "frequent UTI s: not for a long time  History of kidney stones: no  Sexually Active: no  Possibility of pregnancy: No  Precipitating or alleviating factors: none  Therapies tried and outcome: Sulfamethoxazole-trimethoprim 800-160 mg, advil and increasing water     She has dysuria, frequency, and urgency.      The patient has Parkinson's and is on medication through neurology for this.    She has a history of schizoaffective disorder and is on Prozac 50 mg, Risperdal, and amitriptyline    Patient has a history of pulmonary embolism but is no longer on anticoagulants.  This was postoperatively she has completed 6 months of Coumadin.  She also has history of hepatitis C.    The patient had a bladder infection by culture January 20, 2022, she had symptoms of UTI but a negative culture December 30, 2021.  She has not had bladder symptoms since October 2016 until a few months ago.        Review of Systems   Constitutional, HEENT, cardiovascular, pulmonary, gi and gu systems are negative, except as otherwise noted.      Objective    BP (!) 144/78 (BP Location: Right arm, Patient Position: Chair, Cuff Size: Adult Regular)   Pulse 89   Temp 98.8  F (37.1  C) (Tympanic)   Resp 16   Ht 1.6 m (5' 3\")   Wt 62.3 kg (137 lb 6.4 oz)   SpO2 95%   Breastfeeding No   BMI 24.34 kg/m    Body mass index is 24.34 kg/m .  Physical Exam   GENERAL: healthy, alert and no distress  NECK: no adenopathy, no asymmetry, masses, or scars and thyroid normal to palpation  RESP: lungs clear to auscultation - no rales, rhonchi or wheezes  CV: regular rate and rhythm, normal S1 S2, no S3 or S4, no murmur, click or rub, no peripheral edema and peripheral pulses strong  ABDOMEN: soft, nontender, no hepatosplenomegaly, no masses and bowel sounds normal   (female): normal female external genitalia and normal urethral meatus   MS: no gross musculoskeletal defects noted, no edema  PSYCH: mentation appears normal and affect flat    Results for orders " placed or performed in visit on 02/18/22 (from the past 24 hour(s))   UA Macro with Reflex to Micro and Culture - lab collect    Specimen: Urine, Midstream   Result Value Ref Range    Color Urine Yellow Colorless, Straw, Light Yellow, Yellow    Appearance Urine Clear Clear    Glucose Urine Negative Negative mg/dL    Bilirubin Urine Negative Negative    Ketones Urine Negative Negative mg/dL    Specific Gravity Urine 1.025 1.003 - 1.035    Blood Urine Negative Negative    pH Urine 7.0 5.0 - 7.0    Protein Albumin Urine 30  (A) Negative mg/dL    Urobilinogen Urine 0.2 0.2, 1.0 E.U./dL    Nitrite Urine Negative Negative    Leukocyte Esterase Urine Moderate (A) Negative   Urine Microscopic Exam   Result Value Ref Range    Bacteria Urine Many (A) None Seen /HPF    RBC Urine 2-5 (A) 0-2 /HPF /HPF    WBC Urine  (A) 0-5 /HPF /HPF    Squamous Epithelials Urine Few (A) None Seen /LPF

## 2022-02-18 NOTE — TELEPHONE ENCOUNTER
Hep. C is a liver infection, not bladder so they would not have any correlation. Verified with Dr. Iqbal who agreed. Pt notified. She verbalized an understanding.    Kathryn Trejo RN

## 2022-02-22 NOTE — TELEPHONE ENCOUNTER
Please let the patient know her urine test did not grow enough bacteria to be considered a bladder infection.  This is likely contaminant    Danita Iqbal DO

## 2022-02-22 NOTE — TELEPHONE ENCOUNTER
Called patient and let her know she does not have a bladder infection.    She was wondering about the premarin cream as it costs over 300$ from her pharmacy.  Advised she call her insurance company to see if there is a covered alternative.  She will call back with this information      Isa Berg RN

## 2022-02-22 NOTE — TELEPHONE ENCOUNTER
Patient calling wondering about her UA culture result from 2/18/22.    Please review and advise so we can call patient back- seemed a bit confused regarding her urine results in the past.        Isa Berg RN

## 2022-03-29 NOTE — PROVIDER NOTIFICATION
Below is the discharge plan that the patient initially told [facility]. Now, she states that she wants to go to TCU. Educated her that TCU cannot be guaranteed and that she will need to qualify. She will talk with her sons about going home with them helping her and having home care as an alternative plan.    Discharge plan according to [facility]:       03/21/22 1228   Discharge Planning   Patient/Family Anticipates Transition to home;home with family   Living Arrangements   People in Home other (see comments)  (sister in-law will stay with/help)   Type of Residence Private Residence  (indep senior living)   Is your private residence a single family home or apartment? Apartment   Number of Stairs, Within Home, Primary none   Once home, are you able to live on one level? Yes   Which level? Main Level   Bathroom Shower/Tub Walk-in shower   Equipment Currently Used at Home walker, rolling;raised toilet seat   Support System   Support Systems Family Members;Friends/Neighbors   Do you have someone available to stay with you one or two nights once you are home? Yes

## 2022-04-04 NOTE — PATIENT INSTRUCTIONS
Pre-operation Instructions:    - Stop Aspirin and NSAIDS (Ibuprofen, Motrin, Advil, Aleve, Naproxen, Naprosyn) 7 days prior to the surgery/procedure or follow surgeon's direction.    - Stop all Vitamins and Herbal Supplements (including Vitamin E, Fish Oil, Omega 3 Fatty Acids, Ginseng, Gingko) 7 days prior to the surgery/procedure or follow surgeon's direction.    - Medications:   Continue your medications the morning of your surgery/procedure.    - If you become sick before your surgery/procedure (such as a fever, cough, congestion, or sore throat) you should call your primary care provider and surgeon.    - Unless given permission by your surgeon, do not eat or drink after midnight in the evening prior to your surgery/procedure.

## 2022-04-04 NOTE — PROGRESS NOTES
[address] [address]  Phone: [phone]  Primary Provider: Sunita Orantes  Pre-op Performing Provider: SUNITA ORANTES      PREOPERATIVE EVALUATION:  Today's date: 4/4/2022    Laurie Orosco is a 81 year old female who presents for a preoperative evaluation.    Surgical Information:  Surgery/Procedure:  Right total knee replacement   Surgery Location:  [facility]   Surgeon: Dr. Brando Nickerson   Surgery Date: 4/08/2022  Time of Surgery: TBD  Where patient plans to recover: At home with family  Fax number for surgical facility: [phone]    Type of Anesthesia Anticipated: Spinal block     Assessment & Plan     The proposed surgical procedure is considered INTERMEDIATE risk.    Pre-op exam    - EKG 12-lead complete w/read - Clinics  - Basic metabolic panel  (Ca, Cl, CO2, Creat, Gluc, K, Na, BUN); Future  - CBC with platelets; Future  - Basic metabolic panel  (Ca, Cl, CO2, Creat, Gluc, K, Na, BUN)  - CBC with platelets    Primary osteoarthritis of right knee  Reason for surgery    Hypertension goal BP (blood pressure) < 140/90  Known issue that I take into account for their medical decisions, no current exacerbations or new concerns.    LVH (left ventricular hypertrophy)  Known issue that I take into account for their medical decisions, no current exacerbations or new concerns.     Parkinson's disease (H)  Known issue that I take into account for their medical decisions, no current exacerbations or new concerns.     Chronic hepatitis C without hepatic coma (H)  S/p treatment and viral load undetectable    Ischemic colitis (H)  Known issue that I take into account for their medical decisions, no current exacerbations or new concerns.     Schizoaffective disorder, chronic condition (H)  Known issue that I take into account for their medical decisions, no current exacerbations or new concerns.     Personal history of DVT (deep vein  thrombosis)  History of pulmonary embolism  Patient had DVT and PE during post-up period with her last joint surgery.  Recommend DVT prophylaxis in post-operative period.    Encounter for screening for other viral diseases    - Asymptomatic COVID-19 Virus (Coronavirus) by PCR         Risks and Recommendations:  The patient has the following additional risks and recommendations for perioperative complications:   - Consult Hospitalist / IM to assist with post-op medical management    Medication Instructions:  Patient is to take all scheduled medications on the day of surgery   - SSRIs, SNRIs, TCAs, Antipsychotics: Continue without modification.     RECOMMENDATION:  APPROVAL GIVEN to proceed with proposed procedure, without further diagnostic evaluation.        Subjective     HPI related to upcoming procedure: Right knee osteoarthritis with continued symptoms despite conservative management.  She is scheduled for knee replacement.    Preop Questions 4/4/2022   1. Have you ever had a heart attack or stroke? No   2. Have you ever had surgery on your heart or blood vessels, such as a stent placement, a coronary artery bypass, or surgery on an artery in your head, neck, heart, or legs? No   3. Do you have chest pain with activity? No   4. Do you have a history of  heart failure? No   5. Do you currently have a cold, bronchitis or symptoms of other infection? No   6. Do you have a cough, shortness of breath, or wheezing? No   7. Do you or anyone in your family have previous history of blood clots? YES-she had a DVT and PE after her last surgery in 2011 when she had left knee replacement   8. Do you or does anyone in your family have a serious bleeding problem such as prolonged bleeding following surgeries or cuts? No   9. Have you ever had problems with anemia or been told to take iron pills? No   10. Have you had any abnormal blood loss such as black, tarry or bloody stools, or abnormal vaginal bleeding? No   11. Have you  ever had a blood transfusion? YES   11a. Have you ever had a transfusion reaction? YES   12. Are you willing to have a blood transfusion if it is medically needed before, during, or after your surgery? Yes   13. Have you or any of your relatives ever had problems with anesthesia? No   14. Do you have sleep apnea, excessive snoring or daytime drowsiness? No   15. Do you have any artifical heart valves or other implanted medical devices like a pacemaker, defibrillator, or continuous glucose monitor? No   16. Do you have artificial joints? YES -had left knee replacement in 2011   17. Are you allergic to latex? No       Health Care Directive:  Patient has a Health Care Directive on file      Preoperative Review of :   reviewed - controlled substances reflected in medication list.    Status of Chronic Conditions:  See problem list for active medical problems.  Problems all longstanding and stable, except as noted/documented.  See ROS for pertinent symptoms related to these conditions.      Review of Systems  Constitutional, neuro, ENT, endocrine, pulmonary, cardiac, gastrointestinal, genitourinary, musculoskeletal, integument and psychiatric systems are negative, except as otherwise noted.    Patient Active Problem List    Diagnosis Date Noted     Chronic hepatitis C without hepatic coma (H) 05/22/2021     Priority: Medium     Generalized anxiety disorder 02/08/2021     Priority: Medium     Dr. Man, Psychiatry at [facility]       Schizoaffective disorder, chronic condition (H) 02/08/2021     Priority: Medium     Dr. Man, Psychiatry at [facility]       Actinic keratosis 11/25/2019     Priority: Medium     Seborrheic keratoses 08/17/2018     Priority: Medium     TMJ (temporomandibular joint syndrome) 12/14/2016     Priority: Medium     Myofascial muscle pain 12/14/2016     Priority: Medium     Urgency incontinence 04/18/2014     Priority: Medium     Evaluated by [facility] urology, started  on oxybutynin with good results       Parkinson's disease (H) 01/15/2014     Priority: Medium     Diagnosed 10/2014  Dr. Phillips, neurosurgical associates in the past.    Now followed by Dr. Joya at [facility]       Hypertension goal BP (blood pressure) < 140/90 05/03/2012     Priority: Medium     Ischemic colitis (H) 04/25/2012     Priority: Medium     Hospitalized 3/2012, had colonoscopy, CT.  Symptomatic treatment with resolution.       LVH (left ventricular hypertrophy) 04/25/2012     Priority: Medium     Seen on echo done at [facility] 10/2011.  Seen on EKG's.       Ovarian cyst 04/18/2012     Priority: Medium     Ultrasound done 4/2012, repeat in 3-6 months.  Dec. 2012-normal       Health Care Home 03/27/2012     Priority: Medium     [facility] for .  Mile Bello RN-BSN, [facility]  [phone]     DX V65.8 REPLACED WITH [facility] (04/08/2013)       GERD (gastroesophageal reflux disease) 02/22/2012     Priority: Medium     Liver hemangioma 11/16/2011     Priority: Medium     Seen on MRI 11/2011.       Personal history of DVT (deep vein thrombosis) 10/31/2011     Priority: Medium     Provoked by post-op state.  Finished coumadin after 6 months.       Pulmonary embolism and infarction (H) 10/31/2011     Priority: Medium     Provoked by post-op state.  Finished coumadin after 6 months.       CARDIOVASCULAR SCREENING; LDL GOAL LESS THAN 160 06/11/2010     Priority: Medium     Breast microcalcifications 05/12/2010     Priority: Medium     Mammo 5/2010.  Repeat mammo in 6 months.       OA (Osteoarthritis) of Knee, left 05/10/2010     Priority: Medium     left       Migraine      Priority: Medium     Previously saw Dr. Busby, neurology in [place]  Has tried topamax, imitrex, nothing has worked.    Dr. Joya at [facility] for Parkinson and headaches  Now on Propranolol and Butalbital as needed       Depression, major      Priority: Medium     Dr. Figueroa  in past, psychiatry at [facility]  Now Dr. [facility]    Dr. Man, Psychiatry at [facility]       Left knee DJD      Priority: Medium     Dr. Clayton, ortho       Vaginal prolapse      Priority: Medium     Dr. Albert, Urologist.       Advance Care Planning 09/08/2011     Priority: Low     Advance Care Planning 7/19/2016: Receipt of ACP document:  Received: POLST which was signed and dated by provider on 10-19-11.  Document previously scanned on 10-19-11.  Order reviewed and found to be valid.  Code Status reflects choices in most recent ACP document.  Confirmed/documented designated decision maker(s).  Added by Yusra Mena RN Advance Care Planning Liaison with [facility] [employer] Active  Advance Care Planning 4/15/2016: ACP Facilitation Session:    Laurie Orosco presented for ACP Facilitation session at the clinic. She was accompanied by . [facility] Choices information provided and resources reviewed. She currently wishes to complete an ACP document and needs further clarification and/or consideration prior to documenting choices.  She currently has the following questions or concerns about Advance Care Planning: none.  Confirmed/documented legally designated decision maker(s). Code status reflects choices in most recent ACP document. Follow-up meeting: not needed/applicable. Added by Suly Riley Document sent to be scanned.  Discussed advance care planning with patient; information given to patient to review. 9/8/2011           Past Medical History:   Diagnosis Date     Chronic hepatitis C without hepatic coma (H) 5/22/2021     Depression, major     Dr. Figueroa, psychiatry at [facility]     DVT (deep venous thrombosis) (H)     after total knee replacement     Falls frequently      GERD (gastroesophageal reflux disease)      Hepatitis B      Left knee DJD     Dr. Clayton, ortho     Left knee DJD     Dr. Clayton, ortho     Migraine       Kehinde, neurology in [place]     Parkinson's disease (H)      PE (pulmonary embolism)      Vaginal prolapse     Dr. Albert, Urologist.     Past Surgical History:   Procedure Laterality Date     HEMORRHOIDECTOMY       HYSTERECTOMY, PAP NO LONGER INDICATED       ORTHOPEDIC SURGERY      knee replacement 10/11     SURGICAL HISTORY OF -       vaginal repair, Dr. Mcmillan     Current Outpatient Medications   Medication Sig Dispense Refill     AMITRIPTYLINE HCL 50 MG OR TABS 3 TABLETS AT BEDTIME       butalbital-aspirin-caffeine (FIORINAL) capsule Take 1 capsule by mouth every 4 hours as needed for pain. 30 capsule 0     CALCIUM + D OR 1 CAPSULE DAILY       carbidopa-levodopa (SINEMET CR)  MG CR tablet Take 1 tablet by mouth 3 times daily  3     conjugated estrogens (PREMARIN) 0.625 MG/GM vaginal cream Place 1 g vaginally twice a week 30 g 1     famotidine (PEPCID) 20 MG tablet Take 1 tablet (20 mg) by mouth 2 times daily 180 tablet 3     FIBER COMPLETE TABS Take 1 tablet by mouth daily       FLUoxetine (PROZAC) 10 MG tablet Take 10 mg by mouth daily To take with 20mg daily       FLUoxetine (PROZAC) 40 MG capsule Take 40 mg by mouth daily Take with 10mg to = 50mg       gabapentin (NEURONTIN) 100 MG capsule 100 mg 2 times daily At bed time       MELATONIN PO        oxybutynin ER (DITROPAN-XL) 10 MG 24 hr tablet TAKE 1 TABLET (10 MG) BY MOUTH DAILY 90 tablet 3     propranolol (INDERAL) 10 MG tablet Take 10 mg by mouth 2 times daily Prescribed by Neurologist       RISPERDAL 2 MG OR TABS 1 TABLET AT BEDTIME       UNABLE TO FIND MEDICATION NAME: Sod. hyalurante prednisone 0.001-25% eye drops for dry eyes       UNABLE TO FIND MEDICATION NAME: [employer] stool softner oral       YUVAFEM 10 MCG TABS vaginal tablet PLACE 1 TABLET (10 MCG) VAGINALLY TWICE A WEEK 8 tablet 1     HYDROcodone-acetaminophen (NORCO) 7.5-325 MG per tablet Take 1 tablet by mouth every 4 hours as needed for pain With food         Allergies  "  Allergen Reactions     Baclofen      Lisinopril Cough     Morphine Sulfate Rash     Valium Rash        Social History     Tobacco Use     Smoking status: Former Smoker     Types: Cigarettes     Quit date: 3/30/1960     Years since quittin.0     Smokeless tobacco: Never Used     Tobacco comment: Smoked cigarettes for one year when she was 22 years old.   Substance Use Topics     Alcohol use: Yes     Comment: 1 GLASS OF WINE EVERY 1-2 WEEKS     Family History   Problem Relation Age of Onset     Cancer Father         LUNG- SMOKER     Cancer Brother         kidney     Diabetes No family hx of      Hypertension No family hx of      History   Drug Use No         Objective     /60 (BP Location: Right arm)   Pulse 87   Temp 98.1  F (36.7  C) (Oral)   Ht 1.6 m (5' 3\")   Wt 61.5 kg (135 lb 9.6 oz)   SpO2 96%   BMI 24.02 kg/m      Physical Exam    GENERAL APPEARANCE: healthy, alert and no distress     EYES: EOMI, PERRL     HENT: ear canals and TM's normal and nose and mouth without ulcers or lesions     NECK: no adenopathy, no asymmetry, masses, or scars and thyroid normal to palpation     RESP: lungs clear to auscultation - no rales, rhonchi or wheezes     CV: regular rates and rhythm, normal S1 S2, no S3 or S4 and no murmur, click or rub     MS: extremities normal- no gross deformities noted, no evidence of inflammation in joints, FROM in all extremities.     SKIN: no suspicious lesions or rashes     NEURO: Normal strength and tone, sensory exam grossly normal, mentation intact and speech normal     PSYCH: mentation appears normal. and affect normal/bright     LYMPHATICS: No cervical adenopathy    Recent Labs   Lab Test 21  1653 21  1223   HGB 13.0 12.9    197   INR 0.98  --     139   POTASSIUM 4.2 4.2   CR 0.78 0.86        Diagnostics:  Labs pending at this time.  Results will be reviewed when available.  Recent Results (from the past 24 hour(s))   CBC with platelets    Collection " Time: 04/04/22  4:30 PM   Result Value Ref Range    WBC Count 8.3 4.0 - 11.0 10e3/uL    RBC Count 4.37 3.80 - 5.20 10e6/uL    Hemoglobin 13.4 11.7 - 15.7 g/dL    Hematocrit 42.0 35.0 - 47.0 %    MCV 96 78 - 100 fL    MCH 30.7 26.5 - 33.0 pg    MCHC 31.9 31.5 - 36.5 g/dL    RDW 14.2 10.0 - 15.0 %    Platelet Count 195 150 - 450 10e3/uL      EKG: sinus rhythm, voltage criteria for LVH, unchanged from previous tracings    Revised Cardiac Risk Index (RCRI):  The patient has the following serious cardiovascular risks for perioperative complications:   - No serious cardiac risks = 0 points     RCRI Interpretation: 0 points: Class I (very low risk - 0.4% complication rate)           Signed Electronically by: Sunita Brito NP  Copy of this evaluation report is provided to requesting physician.

## 2022-04-04 NOTE — H&P (VIEW-ONLY)
[address] [address]  Phone: [phone]  Primary Provider: Sunita Orantes  Pre-op Performing Provider: SUNITA ORANTES      PREOPERATIVE EVALUATION:  Today's date: 4/4/2022    Laurie Orosco is a 81 year old female who presents for a preoperative evaluation.    Surgical Information:  Surgery/Procedure:  Right total knee replacement   Surgery Location:  [facility]   Surgeon: Dr. Brando Nickerson   Surgery Date: 4/08/2022  Time of Surgery: TBD  Where patient plans to recover: At home with family  Fax number for surgical facility: [phone]    Type of Anesthesia Anticipated: Spinal block     Assessment & Plan     The proposed surgical procedure is considered INTERMEDIATE risk.    Pre-op exam    - EKG 12-lead complete w/read - Clinics  - Basic metabolic panel  (Ca, Cl, CO2, Creat, Gluc, K, Na, BUN); Future  - CBC with platelets; Future  - Basic metabolic panel  (Ca, Cl, CO2, Creat, Gluc, K, Na, BUN)  - CBC with platelets    Primary osteoarthritis of right knee  Reason for surgery    Hypertension goal BP (blood pressure) < 140/90  Known issue that I take into account for their medical decisions, no current exacerbations or new concerns.    LVH (left ventricular hypertrophy)  Known issue that I take into account for their medical decisions, no current exacerbations or new concerns.     Parkinson's disease (H)  Known issue that I take into account for their medical decisions, no current exacerbations or new concerns.     Chronic hepatitis C without hepatic coma (H)  S/p treatment and viral load undetectable    Ischemic colitis (H)  Known issue that I take into account for their medical decisions, no current exacerbations or new concerns.     Schizoaffective disorder, chronic condition (H)  Known issue that I take into account for their medical decisions, no current exacerbations or new concerns.     Personal history of DVT (deep vein  thrombosis)  History of pulmonary embolism  Patient had DVT and PE during post-up period with her last joint surgery.  Recommend DVT prophylaxis in post-operative period.    Encounter for screening for other viral diseases    - Asymptomatic COVID-19 Virus (Coronavirus) by PCR         Risks and Recommendations:  The patient has the following additional risks and recommendations for perioperative complications:   - Consult Hospitalist / IM to assist with post-op medical management    Medication Instructions:  Patient is to take all scheduled medications on the day of surgery   - SSRIs, SNRIs, TCAs, Antipsychotics: Continue without modification.     RECOMMENDATION:  APPROVAL GIVEN to proceed with proposed procedure, without further diagnostic evaluation.        Subjective     HPI related to upcoming procedure: Right knee osteoarthritis with continued symptoms despite conservative management.  She is scheduled for knee replacement.    Preop Questions 4/4/2022   1. Have you ever had a heart attack or stroke? No   2. Have you ever had surgery on your heart or blood vessels, such as a stent placement, a coronary artery bypass, or surgery on an artery in your head, neck, heart, or legs? No   3. Do you have chest pain with activity? No   4. Do you have a history of  heart failure? No   5. Do you currently have a cold, bronchitis or symptoms of other infection? No   6. Do you have a cough, shortness of breath, or wheezing? No   7. Do you or anyone in your family have previous history of blood clots? YES-she had a DVT and PE after her last surgery in 2011 when she had left knee replacement   8. Do you or does anyone in your family have a serious bleeding problem such as prolonged bleeding following surgeries or cuts? No   9. Have you ever had problems with anemia or been told to take iron pills? No   10. Have you had any abnormal blood loss such as black, tarry or bloody stools, or abnormal vaginal bleeding? No   11. Have you  ever had a blood transfusion? YES   11a. Have you ever had a transfusion reaction? YES   12. Are you willing to have a blood transfusion if it is medically needed before, during, or after your surgery? Yes   13. Have you or any of your relatives ever had problems with anesthesia? No   14. Do you have sleep apnea, excessive snoring or daytime drowsiness? No   15. Do you have any artifical heart valves or other implanted medical devices like a pacemaker, defibrillator, or continuous glucose monitor? No   16. Do you have artificial joints? YES -had left knee replacement in 2011   17. Are you allergic to latex? No       Health Care Directive:  Patient has a Health Care Directive on file      Preoperative Review of :   reviewed - controlled substances reflected in medication list.    Status of Chronic Conditions:  See problem list for active medical problems.  Problems all longstanding and stable, except as noted/documented.  See ROS for pertinent symptoms related to these conditions.      Review of Systems  Constitutional, neuro, ENT, endocrine, pulmonary, cardiac, gastrointestinal, genitourinary, musculoskeletal, integument and psychiatric systems are negative, except as otherwise noted.    Patient Active Problem List    Diagnosis Date Noted     Chronic hepatitis C without hepatic coma (H) 05/22/2021     Priority: Medium     Generalized anxiety disorder 02/08/2021     Priority: Medium     Dr. Man, Psychiatry at [facility]       Schizoaffective disorder, chronic condition (H) 02/08/2021     Priority: Medium     Dr. Man, Psychiatry at [facility]       Actinic keratosis 11/25/2019     Priority: Medium     Seborrheic keratoses 08/17/2018     Priority: Medium     TMJ (temporomandibular joint syndrome) 12/14/2016     Priority: Medium     Myofascial muscle pain 12/14/2016     Priority: Medium     Urgency incontinence 04/18/2014     Priority: Medium     Evaluated by [facility] [place] urology, started  on oxybutynin with good results       Parkinson's disease (H) 01/15/2014     Priority: Medium     Diagnosed 10/2014  Dr. Phillips, neurosurgical associates in the past.    Now followed by Dr. Joya at [facility]       Hypertension goal BP (blood pressure) < 140/90 05/03/2012     Priority: Medium     Ischemic colitis (H) 04/25/2012     Priority: Medium     Hospitalized 3/2012, had colonoscopy, CT.  Symptomatic treatment with resolution.       LVH (left ventricular hypertrophy) 04/25/2012     Priority: Medium     Seen on echo done at [facility] 10/2011.  Seen on EKG's.       Ovarian cyst 04/18/2012     Priority: Medium     Ultrasound done 4/2012, repeat in 3-6 months.  Dec. 2012-normal       Health Care Home 03/27/2012     Priority: Medium     [facility] for .  Mile Bello RN-BSN, [facility]  [phone]     DX V65.8 REPLACED WITH 05981 HEALTH CARE HOME (04/08/2013)       GERD (gastroesophageal reflux disease) 02/22/2012     Priority: Medium     Liver hemangioma 11/16/2011     Priority: Medium     Seen on MRI 11/2011.       Personal history of DVT (deep vein thrombosis) 10/31/2011     Priority: Medium     Provoked by post-op state.  Finished coumadin after 6 months.       Pulmonary embolism and infarction (H) 10/31/2011     Priority: Medium     Provoked by post-op state.  Finished coumadin after 6 months.       CARDIOVASCULAR SCREENING; LDL GOAL LESS THAN 160 06/11/2010     Priority: Medium     Breast microcalcifications 05/12/2010     Priority: Medium     Mammo 5/2010.  Repeat mammo in 6 months.       OA (Osteoarthritis) of Knee, left 05/10/2010     Priority: Medium     left       Migraine      Priority: Medium     Previously saw Dr. Busby, neurology in [place]  Has tried topamax, imitrex, nothing has worked.    Dr. Joya at [facility] for Parkinson and headaches  Now on Propranolol and Butalbital as needed       Depression, major      Priority: Medium     Dr. Figueroa  in past, psychiatry at [facility]  Now Dr. [facility]    Dr. Man, Psychiatry at [facility]       Left knee DJD      Priority: Medium     Dr. Clayton, ortho       Vaginal prolapse      Priority: Medium     Dr. Albert, Urologist.       Advance Care Planning 09/08/2011     Priority: Low     Advance Care Planning 7/19/2016: Receipt of ACP document:  Received: POLST which was signed and dated by provider on 10-19-11.  Document previously scanned on 10-19-11.  Order reviewed and found to be valid.  Code Status reflects choices in most recent ACP document.  Confirmed/documented designated decision maker(s).  Added by Yusra Mena RN Advance Care Planning Liaison with [facility] [employer]  Advance Care Planning 4/15/2016: ACP Facilitation Session:    Laurie Orosco presented for ACP Facilitation session at the clinic. She was accompanied by . [facility] Choices information provided and resources reviewed. She currently wishes to complete an ACP document and needs further clarification and/or consideration prior to documenting choices.  She currently has the following questions or concerns about Advance Care Planning: none.  Confirmed/documented legally designated decision maker(s). Code status reflects choices in most recent ACP document. Follow-up meeting: not needed/applicable. Added by Suly Riley Document sent to be scanned.  Discussed advance care planning with patient; information given to patient to review. 9/8/2011           Past Medical History:   Diagnosis Date     Chronic hepatitis C without hepatic coma (H) 5/22/2021     Depression, major     Dr. Figueroa, psychiatry at [facility]     DVT (deep venous thrombosis) (H)     after total knee replacement     Falls frequently      GERD (gastroesophageal reflux disease)      Hepatitis B      Left knee DJD     Dr. Clayton, ortho     Left knee DJD     Dr. Clayton, ortho     Migraine       Kehinde, neurology in [place]     Parkinson's disease (H)      PE (pulmonary embolism)      Vaginal prolapse     Dr. Albert, Urologist.     Past Surgical History:   Procedure Laterality Date     HEMORRHOIDECTOMY       HYSTERECTOMY, PAP NO LONGER INDICATED       ORTHOPEDIC SURGERY      knee replacement 10/11     SURGICAL HISTORY OF -       vaginal repair, Dr. Mcmillan     Current Outpatient Medications   Medication Sig Dispense Refill     AMITRIPTYLINE HCL 50 MG OR TABS 3 TABLETS AT BEDTIME       butalbital-aspirin-caffeine (FIORINAL) capsule Take 1 capsule by mouth every 4 hours as needed for pain. 30 capsule 0     CALCIUM + D OR 1 CAPSULE DAILY       carbidopa-levodopa (SINEMET CR)  MG CR tablet Take 1 tablet by mouth 3 times daily  3     conjugated estrogens (PREMARIN) 0.625 MG/GM vaginal cream Place 1 g vaginally twice a week 30 g 1     famotidine (PEPCID) 20 MG tablet Take 1 tablet (20 mg) by mouth 2 times daily 180 tablet 3     FIBER COMPLETE TABS Take 1 tablet by mouth daily       FLUoxetine (PROZAC) 10 MG tablet Take 10 mg by mouth daily To take with 20mg daily       FLUoxetine (PROZAC) 40 MG capsule Take 40 mg by mouth daily Take with 10mg to = 50mg       gabapentin (NEURONTIN) 100 MG capsule 100 mg 2 times daily At bed time       MELATONIN PO        oxybutynin ER (DITROPAN-XL) 10 MG 24 hr tablet TAKE 1 TABLET (10 MG) BY MOUTH DAILY 90 tablet 3     propranolol (INDERAL) 10 MG tablet Take 10 mg by mouth 2 times daily Prescribed by Neurologist       RISPERDAL 2 MG OR TABS 1 TABLET AT BEDTIME       UNABLE TO FIND MEDICATION NAME: Sod. hyalurante prednisone 0.001-25% eye drops for dry eyes       UNABLE TO FIND MEDICATION NAME: [employer] stool softner oral       YUVAFEM 10 MCG TABS vaginal tablet PLACE 1 TABLET (10 MCG) VAGINALLY TWICE A WEEK 8 tablet 1     HYDROcodone-acetaminophen (NORCO) 7.5-325 MG per tablet Take 1 tablet by mouth every 4 hours as needed for pain With food         Allergies  "  Allergen Reactions     Baclofen      Lisinopril Cough     Morphine Sulfate Rash     Valium Rash        Social History     Tobacco Use     Smoking status: Former Smoker     Types: Cigarettes     Quit date: 3/30/1960     Years since quittin.0     Smokeless tobacco: Never Used     Tobacco comment: Smoked cigarettes for one year when she was 22 years old.   Substance Use Topics     Alcohol use: Yes     Comment: 1 GLASS OF WINE EVERY 1-2 WEEKS     Family History   Problem Relation Age of Onset     Cancer Father         LUNG- SMOKER     Cancer Brother         kidney     Diabetes No family hx of      Hypertension No family hx of      History   Drug Use No         Objective     /60 (BP Location: Right arm)   Pulse 87   Temp 98.1  F (36.7  C) (Oral)   Ht 1.6 m (5' 3\")   Wt 61.5 kg (135 lb 9.6 oz)   SpO2 96%   BMI 24.02 kg/m      Physical Exam    GENERAL APPEARANCE: healthy, alert and no distress     EYES: EOMI, PERRL     HENT: ear canals and TM's normal and nose and mouth without ulcers or lesions     NECK: no adenopathy, no asymmetry, masses, or scars and thyroid normal to palpation     RESP: lungs clear to auscultation - no rales, rhonchi or wheezes     CV: regular rates and rhythm, normal S1 S2, no S3 or S4 and no murmur, click or rub     MS: extremities normal- no gross deformities noted, no evidence of inflammation in joints, FROM in all extremities.     SKIN: no suspicious lesions or rashes     NEURO: Normal strength and tone, sensory exam grossly normal, mentation intact and speech normal     PSYCH: mentation appears normal. and affect normal/bright     LYMPHATICS: No cervical adenopathy    Recent Labs   Lab Test 21  1653 21  1223   HGB 13.0 12.9    197   INR 0.98  --     139   POTASSIUM 4.2 4.2   CR 0.78 0.86        Diagnostics:  Labs pending at this time.  Results will be reviewed when available.  Recent Results (from the past 24 hour(s))   CBC with platelets    Collection " Time: 04/04/22  4:30 PM   Result Value Ref Range    WBC Count 8.3 4.0 - 11.0 10e3/uL    RBC Count 4.37 3.80 - 5.20 10e6/uL    Hemoglobin 13.4 11.7 - 15.7 g/dL    Hematocrit 42.0 35.0 - 47.0 %    MCV 96 78 - 100 fL    MCH 30.7 26.5 - 33.0 pg    MCHC 31.9 31.5 - 36.5 g/dL    RDW 14.2 10.0 - 15.0 %    Platelet Count 195 150 - 450 10e3/uL      EKG: sinus rhythm, voltage criteria for LVH, unchanged from previous tracings    Revised Cardiac Risk Index (RCRI):  The patient has the following serious cardiovascular risks for perioperative complications:   - No serious cardiac risks = 0 points     RCRI Interpretation: 0 points: Class I (very low risk - 0.4% complication rate)           Signed Electronically by: Sunita Brito NP  Copy of this evaluation report is provided to requesting physician.

## 2022-04-07 NOTE — TREATMENT PLAN
Orthopedic Surgery Pre-Op Plan: Laurie Orosco  pre-op review. This is NOT an H&P   Surgeon: Dr. Nickerson    Hospital: [facility]  Name of Surgery: Right Total Knee Arthroplasty   Date of Surgery: 4/8/22  H&P: Completed on 4/4/22 by Sunita Brito at [facility].   History of ASA, NSAIDS, vitamin and/or herbal supplements within 10 days: No  History of blood thinners: No    Plan:   1) Discharge Plan: TCU versus Home with assist of Sons. Please see Discharge Planning section near bottom of this note for further details.     2) Parkinson Disease: On carbidopa-levodopa (Sinemet CR) 3x daily. Follows with Dr. Joya at [facility]. History of frequent falls. Uses rolling walker with ambulation. Currently lives in independent living senior apartment.     3) History of Blood Clots: history of provoked DVT and PE following left total knee replacement surgery in 2011. Treated with 6 months of warfarin. She has no known coagulopathy and is not on any chronic anticoagulation, however, is at increased risk for post-op VTE based on this history. I would recommend consideration for post-op VTE prophylaxis with NOAC agent like Eliquis or Xarelto, Vitamin K antagonist like warfarin or Lovenox after this surgery. Defer final decision on post-op VTE prophylaxis to Dr. Nickerson's team.     4) Left Ventricular Hypertrophy: Mild, stable left ventricular hypertrophy noted on last Echo 2/2015. Denies any chest pain/chest discomfort, GAMEZ, palpitations, lightheadedness, increased edema or syncope.     5) Hypertension: Appears well controlled on propanolol.  Instructed to continue taking propanolol including on the day of surgery.    6) Schizoaffective Disorder: chronic and stable. On Risperdal. Follows with Psychiatry.     7) Depression: on fluoxetine.     8) GERD: on famotidine.     Patient appears medically optimized for upcoming surgery. I would recommend Hospitalist Consult to assist with medical management.  Please call me below with any questions on this patient.       Review of Systems Notable for: Parkinson disease, history of falls, history of blood clots-provoked DVT and PE following left knee replacement surgery in 2011, left ventricular hypertrophy, hypertension, schizoaffective disorder, depression, GERD.    Past Medical History:   Past Medical History:   Diagnosis Date     Chronic hepatitis C without hepatic coma (H) 5/22/2021     Depression, major     Dr. Figueroa, psychiatry at [facility]     DVT (deep venous thrombosis) (H)     after total knee replacement     Falls frequently      GERD (gastroesophageal reflux disease)      Hepatitis B      Left knee DJD     Dr. Clayton, ortho     Left knee DJD     Dr. Clayton, ortho     Migraine     Dr. Busby, neurology in [place]     Parkinson's disease (H)      PE (pulmonary embolism)      Vaginal prolapse     Dr. Albert, Urologist.     Past Surgical History:   Procedure Laterality Date     HEMORRHOIDECTOMY       HYSTERECTOMY, PAP NO LONGER INDICATED       ORTHOPEDIC SURGERY      knee replacement 10/11     SURGICAL HISTORY OF -       vaginal repair, Dr. Mcmillan       Current Medications:  Patient's Medications   New Prescriptions    No medications on file   Previous Medications    AMITRIPTYLINE HCL 50 MG OR TABS    3 TABLETS AT BEDTIME    BUTALBITAL-ASPIRIN-CAFFEINE (FIORINAL) CAPSULE    Take 1 capsule by mouth every 4 hours as needed for pain.    CALCIUM + D OR    1 CAPSULE DAILY    CARBIDOPA-LEVODOPA (SINEMET CR)  MG CR TABLET    Take 1 tablet by mouth 3 times daily    CONJUGATED ESTROGENS (PREMARIN) 0.625 MG/GM VAGINAL CREAM    Place 1 g vaginally twice a week    FAMOTIDINE (PEPCID) 20 MG TABLET    Take 1 tablet (20 mg) by mouth 2 times daily    FIBER COMPLETE TABS    Take 4 tablets by mouth daily     FLUOXETINE (PROZAC) 10 MG TABLET    Take 10 mg by mouth daily To take with 20mg daily    FLUOXETINE (PROZAC) 40 MG CAPSULE    Take 40 mg by  mouth daily Take with 10mg to = 50mg    GABAPENTIN (NEURONTIN) 100 MG CAPSULE    100 mg 2 times daily At bed time    MELATONIN PO        OXYBUTYNIN ER (DITROPAN-XL) 10 MG 24 HR TABLET    TAKE 1 TABLET (10 MG) BY MOUTH DAILY    RISPERDAL 2 MG OR TABS    1 TABLET AT BEDTIME    UNABLE TO FIND    MEDICATION NAME: [employer] stool softner oral    UNABLE TO FIND    MEDICATION NAME: Sod. hyalurante prednisone 0.001-25% eye drops for dry eyes    YUVAFEM 10 MCG TABS VAGINAL TABLET    PLACE 1 TABLET (10 MCG) VAGINALLY TWICE A WEEK   Modified Medications    No medications on file   Discontinued Medications    HYDROCODONE-ACETAMINOPHEN (NORCO) 7.5-325 MG PER TABLET    Take 1 tablet by mouth every 4 hours as needed for pain With food    PROPRANOLOL (INDERAL) 10 MG TABLET    Take 10 mg by mouth 2 times daily Prescribed by Neurologist       ALLERGIES:  Allergies   Allergen Reactions     Baclofen      Lisinopril Cough     Morphine Sulfate Rash     Valium Rash       Social History  Social History     Tobacco Use     Smoking status: Former Smoker     Types: Cigarettes     Quit date: 3/30/1960     Years since quittin.0     Smokeless tobacco: Never Used     Tobacco comment: Smoked cigarettes for one year when she was 22 years old.   Vaping Use     Vaping Use: Never used     Passive vaping exposure: Yes   Substance Use Topics     Alcohol use: Yes     Comment: 1 GLASS OF WINE EVERY 1-2 WEEKS     Drug use: No       Any Abnormal Recent Diagnostics? No      Discharge Planning:   Patient expects to be discharged to: TCU versus Home with assist of her sons. If it is determined that she needs TCU she would like to go [facility] in [place]. If she does NOT need or qualify for TCU she is planning to have her sons help out at her apartment.   Barriers to discharge: Has Parkinson Disease, lives alone in independent living senior living apartment  Assistance Needed?: Yes  Discharge arrangements made: Yes  Discharge  Plan: Patient is expecting that she will need TCU after this surgery because she has Parkinson Disease, a history of falls and went to TCU after previous knee replacement surgery in 2011. Both pre-op RN and Ortho Education RN spoke to this patient and explained that we cannot guarantee she will need or qualify for coverage of TCU stay after this surgery. She voiced understanding and is calling her sons to ask them to help her out at home after surgery if she does not qualify for TCU.   Living Arrangements: Alone  Type of residence: Independent Living Senior Apartment   Do you have stairs?  No   Which level? Apartment is all on one level   What does your bathroom have? Raised Toilet Seat, Walk-in shower. Uses rolling walker for ambulation.    Home Care Services? Not currently  Support Systems: Family, Friends and Neighbors    Pt/Rep Engagement Interventions: Discussed discharge plans  Caregiver at Home: Maybe- calling her sons to ask them to help her at home if she does not need or qualify for TCU.   Patient confirmed they have help/assistance in place at home upon discharge?   No    SAVITA Mercer, CNP   Advanced Practice Nurse Navigator- Orthopedics  [facility]   Phone: [phone]

## 2022-04-07 NOTE — PROGRESS NOTES
I am evaluating this patient for upcoming Right Total Knee Arthroplasty with Dr. Nickerson at [facility] on 4/8/22:    - Patient cleared for surgery on H&P but tells pre-op phone RN and our Ortho Educator RN that she is planning on needing to go to TCU after this surgery. Patient states that she has Parkinson Disease and is not sure that she will be able to manage at home after surgery. Patient does live in an independent living senior apartment currently. She uses a rolling walker for ambulation, has a raised toilet seat and a walk-in shower. Ortho Educator RN spoke to patient and explained that we are unable to guarantee that she will need or qualify for coverage for a TCU stay after this surgery. Most of our elective total joint surgery patients do NOT need or qualify for coverage for TCU after this surgery. Patient voiced understanding that TCU cannot be guaranteed and she is calling her sons to ask them to help her out at home after surgery if TCU is not needed/covered. Dr. Nickerson's team was updated on this information and asked to call us if they had any concerns about proceeding. We did not receive any call or message back from them so we will proceed with surgery as planned. Full Treatment Plan note to follow. Call me if any questions.        SAVITA Mercer, CNP   Advanced Practice Nurse Navigator- Orthopedics  [facility]   Office Phone: [phone]  Direct Fax: [phone]

## 2022-04-08 PROBLEM — Z96.659 S/P TOTAL KNEE ARTHROPLASTY: Status: ACTIVE | Noted: 2022-01-01

## 2022-04-08 NOTE — ANESTHESIA POSTPROCEDURE EVALUATION
Patient: Laurie Orosco    Procedure: Procedure(s):  RIGHT TOTAL KNEE ARTHROPLASTY       Anesthesia Type:  Spinal    Note:  Disposition: Admission   Postop Pain Control: Uneventful            Sign Out: Well controlled pain   PONV: No   Neuro/Psych:    Airway/Respiratory: Uneventful            Sign Out: Acceptable/Baseline resp. status; O2 supplementation               Oxygen: Nasal Cannula   CV/Hemodynamics: Uneventful            Sign Out: Acceptable CV status; No obvious hypovolemia; No obvious fluid overload   Other NRE: NONE   DID A NON-ROUTINE EVENT OCCUR? No           Last vitals:  Vitals Value Taken Time   /74 04/08/22 1030   Temp 36.9  C (98.4  F) 04/08/22 1000   Pulse 83 04/08/22 1033   Resp 38 04/08/22 1033   SpO2 99 % 04/08/22 1033   Vitals shown include unvalidated device data.    Electronically Signed By: John Villasenor MD  April 8, 2022  10:35 AM

## 2022-04-08 NOTE — CONSULTS
[facility]  Consult Note - Residency Hospitalist Service  Date of Admission:  4/8/2022  Consult Requested by: Veda Bae PA-C  Reason for Consult: hospitalist medical comanagement    Assessment & Plan   Laurie Orosco is a 81 year old female admitted on 4/8/2022. She has a history of Parkinson's disease, osteoarthritis, and history of postoperative DVT and PE.  Hospitalist service is consulted for medical comanagement while patient is admitted to the hospital.    Osteoarthritis of right knee s/p left total knee arthroplasty 4/8/2022  h/o postop DVT & PE  Patient is doing well postoperatively and reports the pain is well controlled with current medications.  Has already had a session of physical therapy which seem to have gone well.    Start Xarelto 10 mg at bedtime x 14 days --discussed with orthopedic team    Analgesia per orthopedic team: Acetaminophen, oxycodone, hydrocodone    Recheck hemoglobin in a.m.    Physical therapy, Occupational Therapy    H/o hypertension  elevated blood pressures  Patient with elevated blood pressures postoperatively and improving.  Now 143/90.  Previously not taking any blood pressure medications.  Elevated pressures were likely due to pain/immediate postoperative state.  We will hold off on starting any new antihypertensives.    As needed IV hydralazine for SBP >180 or DBP >110    Check blood pressures every 4 hours    If blood pressures remain elevated overnight, firstly address pain.  Then consider addition of antihypertensive.    Chronic medical conditions    Parkinson's disease: Continue PTA carbidopa levodopa    Migraines: Hold PTA acetaminophen-caffeine-butalbital-aspirin (flurinol) while taking Xarelto; try acetaminophen alone for migraines    Mood disorder, insomnia: Continue PTA fluoxetine, amitriptyline, risperidone    GERD: PTA famotidine    Continue PTA eye drops       The patient's care was discussed with the Attending Physician,  Dr. Maxi Hinkle and Primary team.    Lula Miller MD   Residency Hospitalist service  [facility]  Text page via [facility] Paging/Directory       Clinically Significant Risk Factors Present on Admission   None                   ______________________________________________________________________    Chief Complaint   Elective knee surgery    History is obtained from the patient and electronic health record    History of Present Illness   Laurie Orosco is a 81 year old female who has a history of Parkinson's disease, osteoarthritis, and history of postoperative DVT and PE who presents to [facility] for elective total arthroplasty of right knee.    Patient had replacement of her left knee in 2011 and has not had any issues with that knee since.  Throughout that time, she has had worsening pain in her right knee due to osteoarthritis and is very happy she is finally gotten the procedure done today.  After her previous joint replacement, patient developed DVT and PE.  She does have Parkinson's disease which is well managed with carbidopa levodopa.  She also takes a number of medications for mood disorder and sleep.    Reports the pain is well controlled and that she is in good spirits.  Denies headache, chest pain, shortness of breath, weakness, or paresthesias.    Review of Systems   The 10 point Review of Systems is negative other than noted in the HPI.    Past Medical History    I have reviewed this patient's medical history and updated it with pertinent information if needed.   Past Medical History:   Diagnosis Date     Chronic hepatitis C without hepatic coma (H) 5/22/2021     Depression, major     Dr. Fgiueroa, psychiatry at [facility]     DVT (deep venous thrombosis) (H)     after total knee replacement     Falls frequently      GERD (gastroesophageal reflux disease)      Hepatitis B      Left knee DJD     Dr. Clayton, ortho     Left knee DJD       Matilde, ortho     Migraine     Dr. Busby, neurology in [place]     Motion sickness      Parkinson's disease (H)      PE (pulmonary embolism)      Vaginal prolapse     Dr. Albert, Urologist.       Past Surgical History   I have reviewed this patient's surgical history and updated it with pertinent information if needed.  Past Surgical History:   Procedure Laterality Date     HEMORRHOIDECTOMY       HYSTERECTOMY, PAP NO LONGER INDICATED       ORTHOPEDIC SURGERY      knee replacement 10/11     SURGICAL HISTORY OF -       vaginal repair, Dr. Mcmillan       Social History   I have reviewed this patient's social history and updated it with pertinent information if needed.  Social History     Tobacco Use     Smoking status: Former Smoker     Types: Cigarettes     Quit date: 3/30/1960     Years since quittin.0     Smokeless tobacco: Never Used     Tobacco comment: Smoked cigarettes for one year when she was 22 years old.   Vaping Use     Vaping Use: Never used     Passive vaping exposure: Yes   Substance Use Topics     Alcohol use: Yes     Comment: 1 GLASS OF WINE EVERY 1-2 WEEKS     Drug use: No       Family History   I have reviewed this patient's family history and updated it with pertinent information if needed.  Family History   Problem Relation Age of Onset     Cancer Father         LUNG- SMOKER     Cancer Brother         kidney     Diabetes No family hx of      Hypertension No family hx of        Medications    Reviewed medications with patient.  Medications Prior to Admission   Medication Sig Dispense Refill Last Dose     AMITRIPTYLINE HCL 50 MG OR TABS Take 150 mg by mouth At Bedtime    2022 at Unknown time     butalbital-aspirin-caffeine (FIORINAL) capsule Take 1 capsule by mouth every 4 hours as needed for pain. 30 capsule 0 More than a month at Unknown time     Calcium Carb-Cholecalciferol (CALCIUM 500 + D) 500-125 MG-UNIT TABS Take 1 tablet by mouth daily   2022 at Unknown time      carbidopa-levodopa (SINEMET CR)  MG CR tablet Take 1 tablet by mouth 3 times daily (before meals)   3 4/7/2022 at Unknown time     conjugated estrogens (PREMARIN) 0.625 MG/GM vaginal cream Place 1 g vaginally twice a week 30 g 1 Unknown at Unknown time     docusate sodium (COLACE) 100 MG capsule Take 300 mg by mouth every morning   4/8/2022 at Unknown time     famotidine (PEPCID) 20 MG tablet Take 1 tablet (20 mg) by mouth 2 times daily 180 tablet 3 4/7/2022 at Unknown time     FLUoxetine (PROZAC) 10 MG tablet Take 10 mg by mouth daily To take with 40mg daily   4/8/2022 at Unknown time     FLUoxetine (PROZAC) 40 MG capsule Take 40 mg by mouth daily Take with 10mg to = 50mg   4/8/2022 at Unknown time     gabapentin (NEURONTIN) 100 MG capsule Take 200 mg by mouth At Bedtime At bed time    4/7/2022 at Unknown time     Melatonin 10 MG TABS tablet Take 10 mg by mouth At Bedtime   4/7/2022 at Unknown time     methylcellulose (CITRUCEL) 500 MG TABS tablet Take 4 tablets by mouth every morning   4/8/2022 at Unknown time     oxybutynin ER (DITROPAN-XL) 10 MG 24 hr tablet TAKE 1 TABLET (10 MG) BY MOUTH DAILY 90 tablet 3 4/8/2022 at Unknown time     prednisolone 0.25% and hyaluronate in balanced salt SUSP compounded ophthalmic suspension Place 1 drop into both eyes 2 times daily   4/8/2022 at x 1     RISPERDAL 2 MG OR TABS Take 2 mg by mouth At Bedtime    4/7/2022 at Unknown time     YUVAFEM 10 MCG TABS vaginal tablet PLACE 1 TABLET (10 MCG) VAGINALLY TWICE A WEEK 8 tablet 1 4/7/2022 at Unknown time       Allergies   Allergies   Allergen Reactions     Baclofen      Lisinopril Cough     Morphine Sulfate Rash     Tolerates other opioids in past     Valium Rash       Physical Exam   Vital Signs: Temp: 98.3  F (36.8  C) Temp src: Oral BP: (!) 143/90 Pulse: 82   Resp: 18 SpO2: 95 % O2 Device: None (Room air) Weight: 135 lbs 0 oz    Constitutional: awake, alert, cooperative, no apparent distress, and appears stated age  Eyes:  "Lids and lashes normal, pupils equal, round and reactive to light, extra ocular muscles intact, sclera clear, conjunctiva normal  Hematologic / Lymphatic: no cervical lymphadenopathy and no supraclavicular lymphadenopathy  Respiratory: No increased work of breathing, good air exchange, clear to auscultation bilaterally, no crackles or wheezing  Cardiovascular: Normal apical impulse, regular rate and rhythm, normal S1 and S2, no S3 or S4, and no murmur noted  GI: No scars, normal bowel sounds, soft, non-distended, non-tender, no masses palpated, no hepatosplenomegally  Skin: no bruising or bleeding, normal skin color, texture, turgor and no redness, warmth, or swelling  Musculoskeletal: no lower extremity pitting edema present  there is no redness, warmth, or swelling of the joints  RIGHT KNEE:  warmth absent  tenderness absent  incision site dry and covered in bandages and ice pack  Neurologic: Awake, alert, oriented to name, place and time.  Cranial nerves II-XII are grossly intact.  Motor is 5 out of 5 bilaterally.  Cerebellar finger to nose, heel to shin intact.  Sensory is intact.  Babinski down going, Romberg negative, and gait is normal.    Data   Results for orders placed or performed during the hospital encounter of 04/08/22 (from the past 24 hour(s))   POC US Guidance Needle Placement    Narrative    Ultrasound was performed as guidance to an anesthesia procedure.  Click   \"PACS images\" hyperlink below to view any stored images.  For specific   procedure details, view procedure note authored by anesthesia.   XR Knee Port Right 1/2 Views    Narrative    EXAM: XR KNEE PORT RIGHT 1/2 VIEWS  LOCATION: [facility]  DATE/TIME: 4/8/2022 9:42 AM    INDICATION: Post Op Total Knee  COMPARISON: 03/30/2021.      Impression    IMPRESSION: Uncomplicated cemented right total knee arthroplasty. Normal joint alignment. No fracture or other immediate complication. Postoperative air in the joint and " surrounding soft tissues.

## 2022-04-08 NOTE — PHARMACY-ADMISSION MEDICATION HISTORY
Pharmacy Note - Admission Medication History    Pertinent Provider Information: None   ______________________________________________________________________    Prior To Admission (PTA) med list completed and updated in EMR.       PTA Med List   Medication Sig Note Last Dose     AMITRIPTYLINE HCL 50 MG OR TABS Take 150 mg by mouth At Bedtime   4/7/2022 at Unknown time     butalbital-aspirin-caffeine (FIORINAL) capsule Take 1 capsule by mouth every 4 hours as needed for pain.  More than a month at Unknown time     Calcium Carb-Cholecalciferol (CALCIUM 500 + D) 500-125 MG-UNIT TABS Take 1 tablet by mouth daily  4/7/2022 at Unknown time     carbidopa-levodopa (SINEMET CR)  MG CR tablet Take 1 tablet by mouth 3 times daily (before meals)   4/7/2022 at Unknown time     conjugated estrogens (PREMARIN) 0.625 MG/GM vaginal cream Place 1 g vaginally twice a week  Unknown at Unknown time     docusate sodium (COLACE) 100 MG capsule Take 300 mg by mouth every morning  4/8/2022 at Unknown time     famotidine (PEPCID) 20 MG tablet Take 1 tablet (20 mg) by mouth 2 times daily  4/7/2022 at Unknown time     FLUoxetine (PROZAC) 10 MG tablet Take 10 mg by mouth daily To take with 40mg daily  4/8/2022 at Unknown time     FLUoxetine (PROZAC) 40 MG capsule Take 40 mg by mouth daily Take with 10mg to = 50mg  4/8/2022 at Unknown time     gabapentin (NEURONTIN) 100 MG capsule Take 200 mg by mouth At Bedtime At bed time   4/7/2022 at Unknown time     Melatonin 10 MG TABS tablet Take 10 mg by mouth At Bedtime  4/7/2022 at Unknown time     methylcellulose (CITRUCEL) 500 MG TABS tablet Take 4 tablets by mouth every morning  4/8/2022 at Unknown time     oxybutynin ER (DITROPAN-XL) 10 MG 24 hr tablet TAKE 1 TABLET (10 MG) BY MOUTH DAILY  4/8/2022 at Unknown time     prednisolone 0.25% and hyaluronate in balanced salt SUSP compounded ophthalmic suspension Place 1 drop into both eyes 2 times daily 4/8/2022: Patient will use own supply  4/8/2022 at x 1     RISPERDAL 2 MG OR TABS Take 2 mg by mouth At Bedtime   4/7/2022 at Unknown time     YUVAFEM 10 MCG TABS vaginal tablet PLACE 1 TABLET (10 MCG) VAGINALLY TWICE A WEEK 4/8/2022: Okay to hold while admitted  4/7/2022 at Unknown time       Information source(s): Patient, Patient's pharmacy and Clinic records    Method of interview communication: in-person    Patient was asked about OTC/herbal products specifically.  PTA med list reflects this.    Based on the pharmacist's assessment, the PTA med list information appears reliable    Allergies were reviewed, assessed, and updated with the patient.      Medications available for use during hospital stay: Sod. Hyaluronate-prednisolone eye drops.      Thank you for the opportunity to participate in the care of this patient.      Rory Morris KARLI     4/8/2022     6:26 AM

## 2022-04-08 NOTE — PLAN OF CARE
Goal Outcome Evaluation:     Patient vital signs are at baseline: No,  Reason:  Elevated blood pressure  Patient able to ambulate as they were prior to admission or with assist devices provided by therapies during their stay:  Yes  Patient MUST void prior to discharge:  Yes  Patient able to tolerate oral intake:  Yes  Pain has adequate pain control using Oral analgesics:  Yes  Does patient have an identified :  Yes  Has goal D/C date and time been discussed with patient:  Yes    Reporting mild to moderate pain.  Treated with scheduled tylenol and PRN PO oxycodone 2.5 mg, and ice.  Voided, worked with PT, tolerated regular diet.  A x 1 with walker and gait belt.  Able to express needs.

## 2022-04-08 NOTE — ANESTHESIA CARE TRANSFER NOTE
Patient: Laurie Orosco    Procedure: Procedure(s):  RIGHT TOTAL KNEE ARTHROPLASTY       Diagnosis: Primary osteoarthritis of right knee [M17.11]  Diagnosis Additional Information: No value filed.    Anesthesia Type:   Spinal     Note:    Oropharynx: oropharynx clear of all foreign objects and spontaneously breathing  Level of Consciousness: awake  Oxygen Supplementation: face mask  Level of Supplemental Oxygen (L/min / FiO2): 8  Independent Airway: airway patency satisfactory and stable  Dentition: dentition unchanged  Vital Signs Stable: post-procedure vital signs reviewed and stable  Report to RN Given: handoff report given  Patient transferred to: PACU    Handoff Report: Identifed the Patient, Identified the Reponsible Provider, Reviewed the pertinent medical history, Discussed the surgical course, Reviewed Intra-OP anesthesia mangement and issues during anesthesia, Set expectations for post-procedure period and Allowed opportunity for questions and acknowledgement of understanding      Vitals:  Vitals Value Taken Time   /66 04/08/22 0925   Temp 37.2  C (99  F) 04/08/22 0925   Pulse 84 04/08/22 0926   Resp 11 04/08/22 0926   SpO2 98 % 04/08/22 0926   Vitals shown include unvalidated device data.    Electronically Signed By: SAVITA TORRES CRNA  April 8, 2022  9:28 AM

## 2022-04-08 NOTE — PROGRESS NOTES
[facility]      OUTPATIENT PHYSICAL THERAPY EVALUATION  PLAN OF TREATMENT FOR OUTPATIENT REHABILITATION  (COMPLETE FOR INITIAL CLAIMS ONLY)  Patient's Last Name, First Name, M.I.  YOB: 1940  Laurie Orosco                        Provider's Name  [facility] Medical Record No.  [id number]                               Onset Date:  (P) 04/08/22   Start of Care Date:  (P) 04/08/22      Type:     _X_PT   ___OT   ___SLP Medical Diagnosis:  (P) s/p R TKA                        PT Diagnosis:  (P) impaired functional mobility   Visits from SOC:  1   _________________________________________________________________________________  Plan of Treatment/Functional Goals    Planned Interventions: (P) gait training, home exercise program, transfer training, strengthening     Goals: See Physical Therapy Goals on Care Plan in [facility] electronic health record.    Therapy Frequency: (P) Daily  Predicted Duration of Therapy Intervention: (P) 04/15/22  _________________________________________________________________________________    I CERTIFY THE NEED FOR THESE SERVICES FURNISHED UNDER        THIS PLAN OF TREATMENT AND WHILE UNDER MY CARE     (Physician co-signature of this document indicates review and certification of the therapy plan).                Certification date from: (P) 04/08/22, Certification date to: (P) 05/09/22    Referring Physician: DEVAN) Brando Nickerson            Initial Assessment        See Physical Therapy evaluation dated (P) 04/08/22 in Epic electronic health record.

## 2022-04-08 NOTE — ANESTHESIA PROCEDURE NOTES
Intrathecal injection Procedure Note    Pre-Procedure   Staff -        Anesthesiologist:  John Villasenor MD       Performed By: anesthesiologist       Location: OR       Procedure Start/Stop Times: 4/8/2022 7:40 AM and 4/8/2022 7:44 AM       Pre-Anesthestic Checklist: patient identified, IV checked, risks and benefits discussed, informed consent, monitors and equipment checked and pre-op evaluation  Timeout:       Correct Patient: Yes        Correct Procedure: Yes        Correct Site: Yes        Correct Position: Yes   Procedure Documentation  Procedure: intrathecal injection       Patient Position: sitting       Patient Prep/Sterile Barriers: sterile gloves, patient draped       Skin prep: Chloraprep       Insertion Site: L3-4. (right paramedian approach).       Needle Gauge: 25.        Needle Length (Inches): 3.5        Spinal Needle Type: Pencan       Introducer used       Introducer: 20 G       # of attempts: 1 and  # of redirects:  0    Assessment/Narrative         Paresthesias: No.       CSF fluid: clear.    Medication(s) Administered   1.5% Mepivacaine PF (Intrathecal), 2.5 mL  Medication Administration Time: 4/8/2022 7:44 AM

## 2022-04-08 NOTE — ANESTHESIA PROCEDURE NOTES
Adductor canal Procedure Note    Pre-Procedure   Staff -        Anesthesiologist:  John Villasenor MD       Performed By: anesthesiologist       Location: pre-op       Procedure Start/Stop Times: 4/8/2022 6:50 AM and 4/8/2022 7:00 AM       Pre-Anesthestic Checklist: patient identified, IV checked, site marked, risks and benefits discussed, informed consent, monitors and equipment checked, pre-op evaluation, at physician/surgeon's request and post-op pain management  Timeout:       Correct Patient: Yes        Correct Procedure: Yes        Correct Site: Yes        Correct Position: Yes        Correct Laterality: Yes        Site Marked: Yes  Procedure Documentation  Procedure: Adductor canal       Laterality: right       Patient Position: supine       Patient Prep/Sterile Barriers: sterile gloves, mask       Skin prep: Chloraprep       Needle Type: short bevel       Needle Gauge: 21.        Needle Length (Inches): 4        Ultrasound guided       1. Ultrasound was used to identify targeted nerve, plexus, vascular marker, or fascial plane and place a needle adjacent to it in real-time.       2. Ultrasound was used to visualize the spread of anesthetic in close proximity to the above referenced structure.       3. A permanent image is entered into the patient's record.       4. The visualized anatomic structures appeared normal.       5. There were no apparent abnormal pathologic findings.    Assessment/Narrative         The placement was negative for: blood aspirated, painful injection and site bleeding       Paresthesias: No.     Bolus given via needle..        Secured via.        Insertion/Infusion Method: Single Shot       Complications: none       Injection made incrementally with aspirations every 5 mL.    Medication(s) Administered   Bupivacaine 0.5% w/ 1:200K Epi (Other), 15 mL  Medication Administration Time: 4/8/2022 7:00 AM

## 2022-04-08 NOTE — OP NOTE
DATE OF SERVICE: 4/8/2022     PREOPERATIVE DIAGNOSIS: Primary osteoarthritis of right knee [M17.11]     POSTOPERATIVE DIAGNOSIS: Same    PROCEDURE: Procedure(s):  RIGHT TOTAL KNEE ARTHROPLASTY     IMPLANTS:   Implant Name Type Inv. Item Serial No.  Lot No. LRB No. Used Action   IMP COMP PATELLA HOWM TRI ASYM [id number] 555-L-299 - [id number] Total Joint Component/Insert IMP COMP PATELLA HOWM TRI ASYM [id number] 555-L-299  [employer] [id number] Right 1 Implanted   IMP COMP FEM STRK TRIATHLN CR RT 3 5510-F-302 - [id number] Total Joint Component/Insert IMP COMP FEM STRK TRIATHLN CR RT 3 5510-F-302  [employer] [id number] Right 1 Implanted   BONE CEMENT SIMPLEX FULL DOSE 6191-1-010 - [id number] Cement, Bone BONE CEMENT SIMPLEX FULL DOSE 6191-1-010  [employer] [id number] Right 2 Implanted   IMP BASEPLATE TIBIAL HOWM TRI 3 5520-B-300 - [id number] Total Joint Component/Insert IMP BASEPLATE TIBIAL HOWM TRI 3 5520-B-300  [employer] [id number] Right 1 Implanted   INSERT TIB 3 10MM KN PE CNDRL STAB BRNG TRTHLN STRL LF - [id number] Total Joint Component/Insert INSERT TIB 3 10MM KN PE CNDRL STAB BRNG TRTHLN STRL LF  [employer] [id number] Right 1 Implanted        FINDINGS: Severe tricompartmental degenerative arthritis with varus malalignment as well as 10 degree flexion contracture.    SURGEON: CIELO CRHISTINE MD     ASSISTANT: Kathryn Bae PA-C  The assistant was necessary for patient safety including positioning, retraction, instrumentation, placement of implants, wound closure and dressing application.    INDICATIONS: The patient is a 81-year-old normally healthy individual who has had persistent and progressively worsening right knee pain present for the past 10 years time.  Despite a long course of nonoperative therapy she failed improved.  After having reviewed the risk and benefits of different treatment options she elected to proceed with surgical intervention specifically a right total  [FreeTextEntry1] : Hold Symbicort\par Continue Accolate\par Cleared from pulmonary perspective to have sinus surgery on 1/5/20 knee arthroplasty.  The normal postop course was discussed in detail preoperatively.    PROCEDURE: After informed written consent was obtained, the patient underwent successful placement of a right knee block and was brought to the OR where they underwent successful placement of a Spinal with Adductor Block.  The patient received IV antibiotics.  The leg was sterilely prepped and draped in usual fashion.  After a pause for the cause or timeout with identification of the operative limb, a tourniquet was inflated to 225 mmHg after exsanguination and an Ioban drape was applied.  The surgical team were operative hoods.    Direct midline approach to the knee was made with sharp dissection being carried down through the skin and subcutaneous tissue and a medial parapatellar approach was performed.  An effusion was noted.  There was synovitis.  There was significant degenerative change noted with full loss of articular cartilage as well as osteophytic changes apparent.  The most severe change was medially.  Because of this we directed our attention towards removing medial osteophytes and shifting the prosthesis slightly laterally to decompress the medial compartment.    A rongeur was used to remove osteophytes.  A guide eduar was gently advanced up the femur and the distal femoral cut made without difficulty.  The femur was sized to the appropriate component with the anterior posterior and chamfer as well as sulcus cuts being made without difficulty.  A line to line fit with the trial component was noted.    Attention was then directed towards the tibia.  The tibia was cut referenced off the deficient tibial plateau.  The tibia was sized to the appropriate component.  The appropriate spacer was then applied with full extension being noted as well as excellent flexion.  Good varus valgus stability was appreciated at both 0 and 30 .    The patella was then measured with a caliper cut and sized to the appropriate component.   Midline patellar tracking was appreciated.    The rotation was determined off the trial components.  Meniscal and ACL remnants were debrided.  The  pericapsular tissues were injected with an analgesic anesthetic mix.  Care was taken to avoid the neurovascular  throughout the injection procedure as well as the cutting of both the femur and the tibia.    The wing punch was used for tibial prep.  Copious jet lavage with an additional liter of fluid was performed.  At this point cementing of the tibia was followed by placement of the spacer then the femur then the patella.  All components were held under compression during cement hardening and all extra cement was meticulously removed.    Range of motion tracking and stability were unchanged post fixation.  At this point the tourniquet was let down and hemostasis was achieved using electrocautery.  1 L of dilute sterile Betadine solution was used to irrigate the joint for 3 minutes time.  Further irrigation was then followed by closure with the knee flexed at 90  with multiple #1 Vicryl reinforced with 0 Vicryl subcutaneous tissue with 2-0 Vicryl and skin with Monocryl and Dermabond.  A sterile dressing was applied.  The patient tolerated procedure the procedure well.  There were no known complications.  Sponge and needle counts were reported as correct ×2.  The patient the patient was transferred to [facility] for recovery.  Estimated blood loss was 15 cc.    Brando Nickerson MD

## 2022-04-08 NOTE — ANESTHESIA PREPROCEDURE EVALUATION
Anesthesia Pre-Procedure Evaluation    Patient: Laurie Orosco   MRN: [id number] : 1940        Procedure : Procedure(s):  RIGHT TOTAL KNEE ARTHROPLASTY          Past Medical History:   Diagnosis Date     Chronic hepatitis C without hepatic coma (H) 2021     Depression, major     Dr. Figueroa, psychiatry at [facility]     DVT (deep venous thrombosis) (H)     after total knee replacement     Falls frequently      GERD (gastroesophageal reflux disease)      Hepatitis B      Left knee DJD     Dr. Clayton, ortho     Left knee DJD     Dr. Clayton, ortho     Migraine     Dr. Busby, neurology in [place]     Motion sickness      Parkinson's disease (H)      PE (pulmonary embolism)      Vaginal prolapse     Dr. Albert, Urologist.      Past Surgical History:   Procedure Laterality Date     HEMORRHOIDECTOMY       HYSTERECTOMY, PAP NO LONGER INDICATED       ORTHOPEDIC SURGERY      knee replacement 10/11     SURGICAL HISTORY OF -       vaginal repair, Dr. Mcmillan      Allergies   Allergen Reactions     Baclofen      Lisinopril Cough     Morphine Sulfate Rash     Valium Rash      Social History     Tobacco Use     Smoking status: Former Smoker     Types: Cigarettes     Quit date: 3/30/1960     Years since quittin.0     Smokeless tobacco: Never Used     Tobacco comment: Smoked cigarettes for one year when she was 22 years old.   Substance Use Topics     Alcohol use: Yes     Comment: 1 GLASS OF WINE EVERY 1-2 WEEKS      Wt Readings from Last 1 Encounters:   22 61.2 kg (135 lb)        Anesthesia Evaluation   Pt has had prior anesthetic.     History of anesthetic complications  - PONV.      ROS/MED HX  ENT/Pulmonary:       Neurologic:     (+) migraines, Parkinson's disease,     Cardiovascular: Comment: DVT/PE after previous TKA; LVH    (+) Dyslipidemia hypertension-----    METS/Exercise Tolerance:     Hematologic:       Musculoskeletal:   (+) arthritis,     GI/Hepatic:     (+) GERD,  hepatitis liver disease,     Renal/Genitourinary:       Endo:       Psychiatric/Substance Use:     (+) psychiatric history anxiety, depression and other (comment)     Infectious Disease:       Malignancy:       Other:            Physical Exam    Airway        Mallampati: II   TM distance: > 3 FB   Neck ROM: limited     Respiratory Devices and Support         Dental  no notable dental history         Cardiovascular   cardiovascular exam normal          Pulmonary   pulmonary exam normal                OUTSIDE LABS:  CBC:   Lab Results   Component Value Date    WBC 8.3 04/04/2022    WBC 8.2 05/27/2021    HGB 13.4 04/04/2022    HGB 13.0 05/27/2021    HCT 42.0 04/04/2022    HCT 39.8 05/27/2021     04/04/2022     05/27/2021     BMP:   Lab Results   Component Value Date     04/04/2022     05/27/2021    POTASSIUM 4.1 04/04/2022    POTASSIUM 4.2 05/27/2021    CHLORIDE 107 04/04/2022    CHLORIDE 105 05/27/2021    CO2 31 04/04/2022    CO2 28 05/27/2021    BUN 20 04/04/2022    BUN 21 05/27/2021    CR 0.79 04/04/2022    CR 0.78 05/27/2021    GLC 80 04/04/2022    GLC 82 05/27/2021     COAGS:   Lab Results   Component Value Date    INR 0.98 05/27/2021     POC: No results found for: BGM, HCG, HCGS  HEPATIC:   Lab Results   Component Value Date    ALBUMIN 3.6 05/27/2021    PROTTOTAL 6.8 05/27/2021    ALT 7 05/27/2021    AST 20 05/27/2021    ALKPHOS 98 05/27/2021    BILITOTAL 0.4 05/27/2021     OTHER:   Lab Results   Component Value Date    LACT 1.3 03/26/2012    JONNY 8.4 (L) 04/04/2022    TSH 2.89 07/21/2017       Anesthesia Plan    ASA Status:  3   NPO Status:  NPO Appropriate    Anesthesia Type: Spinal.      Maintenance: TIVA.        Consents    Anesthesia Plan(s) and associated risks, benefits, and realistic alternatives discussed. Questions answered and patient/representative(s) expressed understanding.    - Discussed:     - Discussed with:  Patient      - Extended Intubation/Ventilatory Support  Discussed: No.      - Patient is DNR/DNI Status: No    Use of blood products discussed: Yes.     - Discussed with: Patient.     - Consented: consented to blood products            Reason for refusal: other.     Postoperative Care    Pain management: IV analgesics, Oral pain medications, Peripheral nerve block (Single Shot), Multi-modal analgesia.   PONV prophylaxis: Dexamethasone or Solumedrol, Ondansetron (or other 5HT-3)     Comments:    Other Comments: Saphenous nerve block for POP per surgeon request.  Decadron, Zofran.  Diprivan infusion.  Ketamine (0.5 mg/kg).  Magnesium.            John Villasenor MD

## 2022-04-09 NOTE — PROGRESS NOTES
"Ortho Progress Note      Post-operative Day: 1 Day Post-Op  S/P Procedure(s):  RIGHT TOTAL KNEE ARTHROPLASTY      Assessment: 1 day s/p R TKA.    Plan:   Continue PT/OT   For dizziness, appreciate hospitalist recommendation and will defer to their expertise.  Weightbearing status:WBAT  Anticoagulation: ASA 81 PO BID in addition to SCDs, abilio stockings and early ambulation.  Discharge planning: pending PT/OT recommendation.      Subjective:  Pain: mild  Chest pain, SOB:  NO  Nausea, vomiting:  NO  Lightheadedness, dizziness:  YES - she reports getting dizzy at baseline, and shortly before today's visit was the first time she has been dizzy since surgery. Nurse was planning on contacting the hospitalist for recommendation.  Neuro:  Patient denies new onset numbness or paresthesias    Objective:  /58 (BP Location: Right arm, Patient Position: Semi-Zepeda's, Cuff Size: Adult Regular)   Pulse 80   Temp 97.7  F (36.5  C) (Oral)   Resp 19   Ht 1.575 m (5' 2\")   Wt 61.2 kg (135 lb)   SpO2 94%   BMI 24.69 kg/m    Gen: A&O x 3. NAD. Appears comfortable lying in bed.  Wound status: clean, dry and intact dressing.  Circulation, motion and sensation: Dorsiflexion/plantarflexion intact and equal bilaterally; distal lower extremity sensation is intact and equal bilaterally.  Swelling: mild  Calf tenderness: calves are soft and non-tender bilaterally.    Pertinent Labs   Lab Results: personally reviewed.   Lab Results   Component Value Date    INR 0.98 05/27/2021    INR 1.13 03/29/2012    INR 2.70 (H) 03/27/2012     Lab Results   Component Value Date    WBC 8.3 04/04/2022    HGB 9.0 (L) 04/09/2022    HCT 42.0 04/04/2022    MCV 96 04/04/2022     04/04/2022     Lab Results   Component Value Date     04/04/2022    CO2 31 04/04/2022         Report completed by:  Candi Schuler PA-C  Date: 4/9/2022  Time: 9:45 AM    "

## 2022-04-09 NOTE — PROGRESS NOTES
[facility]    Medicine Progress Note - Residency Service    Date of Admission:  4/8/2022    Assessment & Plan          Laurie Orosco is a 81 year old female admitted on 4/8/2022. She has a history of Parkinson's disease, osteoarthritis, and history of postoperative DVT and PE.  Hospitalist service is consulted for medical comanagement while patient is admitted to the hospital.    Osteoarthritis of right knee s/p left total knee arthroplasty 4/8/2022  h/o postop DVT & PE  Patient is doing well postoperatively and reports the pain is well controlled with current medications.  Doing well with physical therapy.      Xarelto 10 mg at bedtime x 14 days    Discontinue aspirin    Analgesia per orthopedic team: Acetaminophen, oxycodone, hydrocodone    Physical therapy, Occupational Therapy    Anticipate discharge in a.m. per Ortho--- med rec completed    Symptomatic anemia due to blood loss  Orthostatic hypotension  On POD1 patient developed orthostatic hypotension, lightheadedness on standing.  Hemoglobin POD1 was 9.0 previously 13.4 prior to surgery.  Patient is dry appearing on exam.  Symptoms likely multifactorial due to anemia, dehydration, and known Parkinson's disease.    1 L normal saline bolus given this morning    Encourage oral intake of fluids    Vital signs every 4 hours    Start supplementation with ferrous sulfate 325 mg daily    H/o hypertension  Labile blood pressures  Patient does not take any blood pressure medications at home.  In the postoperative period she has had labile blood pressures with readings as high as 179/82 and as low as 71/51 (while standing) during this hospital stay.  Given recent hypotension and likely autonomic dysfunction from Parkinson's disease, will hold off on any antihypertensives.    Maintain oral hydration as above    Vital signs every 4 hours    For elevated blood pressures, first address pain    Chronic medical conditions    Parkinson's disease:  Continue PTA carbidopa levodopa    Migraines: Hold PTA acetaminophen-caffeine-butalbital-aspirin (flurinol) while taking Xarelto; try acetaminophen alone for migraines    Mood disorder, insomnia: Continue PTA fluoxetine, amitriptyline, risperidone, melatonin    GERD: PTA famotidine    Continue PTA eye drops       Diet: Advance Diet as Tolerated: Regular Diet Adult  Discharge Instruction - Regular Diet Adult    DVT Prophylaxis: DOAC  Burger Catheter: Not present  Central Lines: None  Cardiac Monitoring: None  Code Status: Full Code      Disposition Plan   Expected Discharge: 04/10/2022     Anticipated discharge location: inpatient rehabilitation facility (TCU )    Delays:  None       The patient's care was discussed with the Attending Physician, Dr. Maxi Hinkle.    Lula Miller MD  Residency Service   [facility]  Text page via [facility] Paging/Directory       Clinically Significant Risk Factors Present on Admission                 ______________________________________________________________________    Interval History   This morning patient with dizziness on standing and lightheadedness.  She says pain is well controlled with current medications and is working with physical therapy.    Data reviewed today: I reviewed all medications, new labs and imaging results over the last 24 hours.    Physical Exam   Vital Signs: Temp: 97.7  F (36.5  C) Temp src: Oral BP: (!) 179/82 Pulse: 88   Resp: 20 SpO2: 91 % O2 Device: None (Room air)    Weight: 135 lbs 0 oz    General: Tired appearing, no apparent distress  Mouth/oropharynx: Dry mucosal membrane  Cardiovascular: Normal rate and regular rhythm, normal heart sounds  Lungs: Pulmonary effort is normal, normal breath sounds  Extremities: no lower extremity edema; right lower extremity wrapped in bandage and nontender to palpation  Pulses:  2+ radial, dorsalis pedis  Skin: Warm and dry. No concerning rashes or lesions.  Neurologic: No focal  deficit, alert and oriented x3  Psychiatric: normal mood and affect, cooperative    Data   Recent Labs   Lab 04/09/22  1040 04/09/22  0945 04/09/22  0614 04/04/22  1630   WBC  --   --   --  8.3   HGB  --   --  9.0* 13.4   MCV  --   --   --  96   PLT  --   --   --  195   NA  --   --   --  140   POTASSIUM  --   --   --  4.1   CHLORIDE  --   --   --  107   CO2  --   --   --  31   BUN  --   --   --  20   CR  --   --   --  0.79   ANIONGAP  --   --   --  2*   JONNY  --   --   --  8.4*   * 88  --  80

## 2022-04-09 NOTE — PROVIDER NOTIFICATION
Provider notified of orthostatic hypotensive episodes of near syncope while standing with supervision.  Standing BP 71/51.  Supplemental iron and fluid bolus ordered.

## 2022-04-09 NOTE — PLAN OF CARE
Goal Outcome Evaluation:          Problem: Plan of Care - These are the overarching goals to be used throughout the patient stay.    Goal: Optimal Comfort and Wellbeing  Intervention: Monitor Pain and Promote Comfort  Recent Flowsheet Documentation  Taken 4/9/2022 0001 by Kelsie Pena RN  Pain Management Interventions: medication (see MAR)        Pain managed with PRN oxy 2.5 mg. Resting throughout shift. Bed alarms on for safety.     Patient vital signs are at baseline: Yes  Patient able to ambulate as they were prior to admission or with assist devices provided by therapies during their stay:  No,  Reason: assist of 1 with walker and gait belt   Patient MUST void prior to discharge:  Yes  Patient able to tolerate oral intake:  Yes  Pain has adequate pain control using Oral analgesics:  Yes  Does patient have an identified :  No,  Reason:  N/A  Has goal D/C date and time been discussed with patient:  Yes ; possible barrier to discharge being lack of support at home.

## 2022-04-09 NOTE — PROGRESS NOTES
"   04/09/22 0810   Quick Adds   Type of Visit Initial Occupational Therapy Evaluation   Living Environment   People in Home alone   Current Living Arrangements apartment   Living Environment Comments RTS, vanity on left side, walk in shower w/grab and seat   Self-Care   Usual Activity Tolerance good  (\"I do it all\")   Current Activity Tolerance moderate   Instrumental Activities of Daily Living (IADL)   IADL Comments 2 sons assist-brings groceries, manages finances,  passed 2 weeks ago, sons bring meals, has cleaning staff, pt I w/dressing, laundry, bathing   General Information   Onset of Illness/Injury or Date of Surgery 04/08/22   Referring Physician Krishan   Patient/Family Therapy Goal Statement (OT) None stated   Right Lower Extremity (Weight-bearing Status) weight-bearing as tolerated (WBAT)   Cognitive Status Examination   Affect/Mental Status (Cognitive)   (sleepy,)   Cognitive Status Comments Pt answered questions appropriately about home setting, mildly forgetful during session pt became very dizzy during session not always alert at end tx-nursing joined session   Pain Assessment   Patient Currently in Pain Yes, see Vital Sign flowsheet   Strength Comprehensive (MMT)   Comment, General Manual Muscle Testing (MMT) Assessment not formally asessed, possibly protecting L UE noted to not place hand on walker w/stand   Bed Mobility   Comment (Bed Mobility) Min A to EOB w/leg , Max A x2 back into bed due to dizziness   Transfers   Transfers sit-stand transfer   Transfer Comments Mod/Max A   Clinical Impression   Criteria for Skilled Therapeutic Interventions Met (OT) Yes, treatment indicated   OT Diagnosis Decreased ADL independence   OT Problem List-Impairments impacting ADL activity tolerance impaired;pain   Assessment of Occupational Performance 1-3 Performance Deficits   Identified Performance Deficits Bed mob, transfers, toileting, LE dressing   Planned Therapy Interventions (OT) ADL " retraining;cognition   Clinical Decision Making Complexity (OT) moderate complexity   Anticipated Equipment Needs Upon Discharge (OT)   (Leg )   Risk & Benefits of therapy have been explained patient   OT Discharge Planning   OT Discharge Recommendation (DC Rec) home with assist;home with home care occupational therapy;Transitional Care Facility   OT Rationale for DC Rec Pending progress, pt dizzy during intial OT session and was unable to fully complete OT session needing to lay back down in bed.   Therapy Certification   Start of Care Date 04/09/22   Certification date from 04/09/22   Certification date to 05/09/22   Medical Diagnosis S/p R TKA   Total Evaluation Time (Minutes)   Total Evaluation Time (Minutes) 15   OT Goals   Therapy Frequency (OT) Daily   OT Predicated Duration/Target Date for Goal Attainment 04/13/22   OT Goals Lower Body Dressing;Bed Mobility;Toilet Transfer/Toileting   OT: Lower Body Dressing Minimal assist;using adaptive equipment   OT: Bed Mobility Modified independent   OT: Toilet Transfer/Toileting Minimal assist

## 2022-04-09 NOTE — PLAN OF CARE
Goal Outcome Evaluation:    Patient vital signs are at baseline: Yes  Patient able to ambulate as they were prior to admission or with assist devices provided by therapies during their stay:  No,  Reason:  Hypotensive episodes  Patient MUST void prior to discharge:  Yes  Patient able to tolerate oral intake:  Yes  Pain has adequate pain control using Oral analgesics:  No,  Reason:  IV dilaudid used to treat pain  Does patient have an identified :  No,  Reason: States will not have help at home.  Has goal D/C date and time been discussed with patient:  Yes        Rating mild to severe pain. Treated with scheduled tylenol, and PRN PO oxycodone 5mg, and IV dilaudid, ice, and aromatherapy.  Did not ambulate this shift due to syncopal episodes, but did express desire towards end of shift to get out of bed.  Able to express needs.  Remains on bed alarm for safety.

## 2022-04-09 NOTE — CONSULTS
Care Management Initial Consult    General Information  Assessment completed with: Patient,    Type of CM/SW Visit: Initial Assessment    Primary Care Provider verified and updated as needed: Yes   Readmission within the last 30 days:        Reason for Consult: discharge planning  Advance Care Planning:            Communication Assessment  Patient's communication style: spoken language (English or Bilingual)    Hearing Difficulty or Deaf: no   Wear Glasses or Blind: yes    Cognitive  Cognitive/Neuro/Behavioral: WDL                      Living Environment:   People in home: alone     Current living Arrangements: independent living facility      Able to return to prior arrangements:         Family/Social Support:  Care provided by: self, child(ginger)  Provides care for: no one  Marital Status:   Children          Description of Support System: Supportive, Involved         Current Resources:   Patient receiving home care services: No     Community Resources: None  Equipment currently used at home: walker, rolling  Supplies currently used at home: None    Employment/Financial:  Employment Status: retired        Financial Concerns: No concerns identified   Referral to Financial Worker: No       Lifestyle & Psychosocial Needs:  Social Determinants of Health     Tobacco Use: Medium Risk     Smoking Tobacco Use: Former Smoker     Smokeless Tobacco Use: Never Used   Alcohol Use: Not on file   Financial Resource Strain: Not on file   Food Insecurity: Not on file   Transportation Needs: Not on file   Physical Activity: Not on file   Stress: Not on file   Social Connections: Not on file   Intimate Partner Violence: Not on file   Depression: Not at risk     PHQ-2 Score: 0   Housing Stability: Not on file       Functional Status:  Prior to admission patient needed assistance:   Dependent ADLs:: Independent  Dependent IADLs:: Cleaning, Transportation       Mental Health Status:  Mental Health Status: No Current Concerns        Chemical Dependency Status:  Chemical Dependency Status: No Current Concerns             Values/Beliefs:  Spiritual, Cultural Beliefs, Yarsanism Practices, Values that affect care: no               Additional Information:  WIL met and introduced self and CM services to Pt.  Pt lives at [facility]. Pt uses a 4WW. Therapy initially recommended Home PT/OT and recommendation changed to TCU. Pt would like to go to [facility].  WIL received VM from Pt's son Shawn [phone] asking to page [facility] [phone] admissions at [facility].  [facility] talked with Tereza and faxed referral and would have bed available after assessment. PAS needed.  [facility] spoke with Shawn and would transport Pt.     AYANNA Mabry

## 2022-04-09 NOTE — PLAN OF CARE
Goal Outcome Evaluation:  Patient vital signs are at baseline: Yes  Patient able to ambulate as they were prior to admission or with assist devices provided by therapies during their stay:  Yes  Patient MUST void prior to discharge:  Yes  Patient able to tolerate oral intake:  Yes  Pain has adequate pain control using Oral analgesics:  Yes  Does patient have an identified :  Yes  Has goal D/C date and time been discussed with patient:  Yes. Discharge plan still to be determine. PT recommendation is home however family wants TCU for pt has not support at home.

## 2022-04-10 NOTE — PROGRESS NOTES
Ortho Progress Note        Post-operative Day: 2 Day Post-Op  S/P Procedure(s):  RIGHT TOTAL KNEE ARTHROPLASTY        Assessment: 2 day s/p R TKA.     Plan:   Continue PT/OT   Weightbearing status:WBAT  Anticoagulation: ASA 81 PO BID in addition to SCDs, abilio stockings and early ambulation.  Discharge planning: to TCU when bed available, today or tomorrow.        Subjective:  Pain: mild  Chest pain, SOB:  NO  Nausea, vomiting:  NO  Lightheadedness, dizziness:  Patient denies at this time.   Neuro:  Patient denies new onset numbness or paresthesias     Objective:  Gen: A&O x 3. NAD. Appears comfortable lying in bed. She is somewhat sleepy on arrival but is rousable and answer questions and follows exam well.  Wound status: clean, dry and intact dressing.  Circulation, motion and sensation: Dorsiflexion/plantarflexion intact and equal bilaterally; distal lower extremity sensation is intact and equal bilaterally.  Swelling: mild  Calf tenderness: calves are soft and non-tender bilaterally.    Recent Labs   Lab Test 04/10/22  0759 04/09/22  0614 04/04/22  1630 05/27/21  1653 05/11/21  1223   HGB 9.0* 9.0* 13.4 13.0 12.9   INR  --   --   --  0.98  --    PLT  --   --  195 204 197         Paige Guzman PA-C  [facility]

## 2022-04-10 NOTE — PLAN OF CARE
Goal Outcome Evaluation:  Patient vital signs are at baseline: Yes  Patient able to ambulate as they were prior to admission or with assist devices provided by therapies during their stay:  Yes  Patient MUST void prior to discharge:  Yes  Patient able to tolerate oral intake:  Yes  Pain has adequate pain control using Oral analgesics:  Yes. Oral pain medications administered for pain control.  Does patient have an identified :  Yes  Has goal D/C date and time been discussed with patient:  Yes

## 2022-04-10 NOTE — PROGRESS NOTES
[facility]      OUTPATIENT OCCUPATIONAL THERAPY  EVALUATION  PLAN OF TREATMENT FOR OUTPATIENT REHABILITATION  (COMPLETE FOR INITIAL CLAIMS ONLY)  Patient's Last Name, First Name, M.I.  YOB: 1940  Laurie Orosco                          Provider's Name  [facility] Medical Record No.  [id number]                               Onset Date:  04/08/22   Start of Care Date:  04/09/22     Type:     ___PT   _X_OT   ___SLP Medical Diagnosis:  S/p R TKA                        OT Diagnosis:  Decreased ADL independence   Visits from SOC:  1   _________________________________________________________________________________  Plan of Treatment/Functional Goals    Planned Interventions: ADL retraining, cognition   Goals: See Occupational Therapy Goals on Care Plan in [employer] electronic health record.    Therapy Frequency: Daily  Predicted Duration of Therapy Intervention: 04/13/22  _________________________________________________________________________________    I CERTIFY THE NEED FOR THESE SERVICES FURNISHED UNDER        THIS PLAN OF TREATMENT AND WHILE UNDER MY CARE     (Physician co-signature of this document indicates review and certification of the therapy plan).              Certification date from: 04/09/22, Certification date to: 05/09/22    Referring Physician: Krishan            Initial Assessment        See Occupational Therapy evaluation dated 04/09/22 in Epic electronic health record.

## 2022-04-10 NOTE — PROVIDER NOTIFICATION
Pt is exhibiting increase ST confusion and a little more forgetful. PT is A&Ox4. Increase monitoring of pt.

## 2022-04-10 NOTE — PLAN OF CARE
"Pt eating and drinking small amounts, purwik in place with active bowel sounds    Pt had positive orthostatics today and was started on fluids.  She has been in bed most of the day due to a near syncopal episode during orthostatic BPs with NA and PT. She has been repositioned throughout shift.     Patient vital signs are at baseline: Yes  MD stated at this drop in BP from sitting to standing is common in people with parkinson's.   Patient able to ambulate as they were prior to admission or with assist devices provided by therapies during their stay:  No,  Reason:  BP issues  Patient MUST void prior to discharge:  Yes  Patient able to tolerate oral intake:  Yes  Pain has adequate pain control using Oral analgesics:  Yes Dilaudid PO is now working. Oxycodone po did nothing.   Does patient have an identified :  Yes  Has goal D/C date and time been discussed with patient:  Yes      Problem: Plan of Care - These are the overarching goals to be used throughout the patient stay.    Goal: Plan of Care Review/Shift Note  Description: The Plan of Care Review/Shift note should be completed every shift.  The Outcome Evaluation is a brief statement about your assessment that the patient is improving, declining, or no change.  This information will be displayed automatically on your shift note.  Outcome: Ongoing, Progressing  Goal: Patient-Specific Goal (Individualized)  Description: You can add care plan individualizations to a care plan. Examples of Individualization might be:  \"Parent requests to be called daily at 9am for status\", \"I have a hard time hearing out of my right ear\", or \"Do not touch me to wake me up as it startles me\".  Outcome: Ongoing, Progressing     Problem: Adjustment to Surgery (Knee Arthroplasty)  Goal: Optimal Coping  Outcome: Ongoing, Progressing           Goal Outcome Evaluation:      Continue to monitor VS, labs, pain level, incision site and activity tolerance.                   "

## 2022-04-10 NOTE — PROGRESS NOTES
Care Management Follow Up    Length of Stay (days): 0    Expected Discharge Date: 04/11/2022     Concerns to be Addressed:       Patient plan of care discussed at interdisciplinary rounds: Yes    Anticipated Discharge Disposition: TCU pending       Patient/Family in Agreement with the Plan: yes      Additional Information:  CM called and reached Tereza's voicemail and requested call back. For placement.        AYANNA Gudino

## 2022-04-10 NOTE — PLAN OF CARE
Goal Outcome Evaluation:      Unable to complete orthostatic blood pressure. Patient reported feeling weak and was not able to stand through the assessment. Denied feeling lightheaded. Oncoming AM nurse updated.

## 2022-04-10 NOTE — PROVIDER NOTIFICATION
Ortho provider notified for better pain oral management as not to hinder discharge.  Provider called back with new orders.  Oxy 2.5-5mg Q3. Atarax 10mg Q6.

## 2022-04-10 NOTE — PROGRESS NOTES
[facility]    Progress Note - Hospitalist Service       Date of Admission:  4/8/2022    Assessment & Plan          81-year-old female with a history of Parkinson's disease, osteoarthritis, and postop DVT and PE admitted on 4/8/2022 for right TKA.  She is POD 2.    Osteoarthritis of right knee s/p right total knee arthroplasty 4/8/2022  h/o postop DVT & PE  Pain not yet well controlled.  Patient reported better pain control in the past with Dilaudid over oxycodone.  Has not been able to participate in PT due to ongoing dizziness.  Aspirin was discontinued postop.    Xarelto 10 mg at bedtime x 14 days    Pain control per orthopedics, Dilaudid ordered as needed    PT    OT     Orthostatic hypotension  Likely multifactorial in the setting of blood loss during surgery, chronic use of amitriptyline, known Parkinson's disease, dehydration.      IV fluids with LR at 100 cc/h    Check orthostatics in the morning    Encourage oral intake of fluids    Vital signs q4h    We will consider midodrine tomorrow    Symptomatic anemia due to blood loss    Continue ferrous sulfate 325 mg daily      H/o hypertension  Labile blood pressures  On no antihypertensive PTA.  Fluctuating and labile BPs postop between 179/82 and 71/51 (while standing) during this hospital stay.    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    Maintain oral hydration as above    Trend fluid status    Vital signs every 4 hours    Treat pain as above    Recheck BP 15 minutes after unusually high or low BP       Chronic medical conditions    Parkinson's disease: Continue PTA carbidopa levodopa    Migraines: Hold PTA acetaminophen-caffeine-butalbital-aspirin (flurinol) while taking Xarelto; try acetaminophen alone for migraines    Mood disorder, insomnia: Continue PTA fluoxetine, amitriptyline, risperidone, melatonin    GERD: PTA famotidine    Continue PTA eye drops       Diet: Advance Diet as Tolerated:  Regular Diet Adult  Discharge Instruction - Regular Diet Adult  Diet    DVT Prophylaxis: xarelto  Burger Catheter: Not present  Fluids: LR @@ 100 ml/hr  Central Lines: None  Cardiac Monitoring: None  Code Status: Full Code      Disposition Plan   Expected Discharge: 04/11/2022     Anticipated discharge location: inpatient rehabilitation facility (TCU )         The patient's care was discussed with the Attending Physician, Dr. Hinkle.    Tenisha Kunz MD  Hospitalist Service  [facility]  Securely message with the Vocera Web Console (learn more here)  Text page via [employer] Paging/Directory         Clinically Significant Risk Factors Present on Admission                      ______________________________________________________________________    Interval History   Reports poorly controlled pain in the right knee.  Has not been able to participate in PT due to dizziness.  Denies headache.  No shortness of breath or abdominal pain.    Data reviewed today: I reviewed all medications, new labs and imaging results over the last 24 hours. I personally reviewed no images or EKG's today.    Physical Exam   Vital Signs: Temp: 97.8  F (36.6  C) Temp src: Oral BP: 118/57 Pulse: 85   Resp: 18 SpO2: 93 % O2 Device: None (Room air)    Weight: 135 lbs 0 oz  Physical Exam  Constitutional:       General: She is not in acute distress.  Cardiovascular:      Rate and Rhythm: Normal rate and regular rhythm.   Neurological:      Mental Status: She is alert. Mental status is at baseline.     MSK: RLE clean, dry and intact dressing.      Data   Recent Labs   Lab 04/10/22  0759 04/09/22  1040 04/09/22  0945 04/09/22  0614 04/04/22  1630   WBC  --   --   --   --  8.3   HGB 9.0*  --   --  9.0* 13.4   MCV  --   --   --   --  96   PLT  --   --   --   --  195   NA  --   --   --   --  140   POTASSIUM  --   --   --   --  4.1   CHLORIDE  --   --   --   --  107   CO2  --   --   --   --  31   BUN  --   --   --   --  20   CR   --   --   --   --  0.79   ANIONGAP  --   --   --   --  2*   JONNY  --   --   --   --  8.4*   GLC  --  154* 88  --  80     No results found for this or any previous visit (from the past 24 hour(s)).  Medications     lactated ringers 100 mL/hr at 04/10/22 1353       acetaminophen  975 mg Oral Q8H     amitriptyline  150 mg Oral At Bedtime     carbidopa-levodopa  1 tablet Oral TID AC     famotidine  20 mg Oral BID     ferrous sulfate  325 mg Oral Daily     FLUoxetine  10 mg Oral Daily     FLUoxetine  40 mg Oral Daily     gabapentin  200 mg Oral At Bedtime     polyethylene glycol  17 g Oral Daily     prednisolone 0.25% and hyaluronate in balanced salt  1 drop Both Eyes BID     risperiDONE  2 mg Oral At Bedtime     rivaroxaban ANTICOAGULANT  10 mg Oral Daily with supper     senna-docusate  1 tablet Oral BID     sodium chloride (PF)  3 mL Intracatheter Q8H

## 2022-04-10 NOTE — PLAN OF CARE
Goal Outcome Evaluation:      Patient vital signs are at baseline: Pt has elevated BP, but pt received a bolus due to episodes of hypotension and almost passing out from report.    Patient able to ambulate as they were prior to admission or with assist devices provided by therapies during their stay:  Yes  Patient MUST void prior to discharge:  Yes  Patient able to tolerate oral intake:  No. Provider contacted for better oral pain management in additional to non-pharmacological interventions.  Pain has adequate pain control using Oral analgesics:  Yes  Does patient have an identified :  Yes  Has goal D/C date and time been discussed with patient:  Yes. Discharge plan for 4/10/2022 pending TCU placement at pt place of choice.

## 2022-04-10 NOTE — PROGRESS NOTES
Faculty Attestation    I saw and examined the patient.    I discussed the case with the resident physician, Dr. Kunz, and I assessed the patient on 4/10/2022.    I agree with the findings, assessment and plan.  Re:  Orthostatic hypotension:  Polyfactorial:  Blood loss anemia, parkinsons, and TCA.  Agree w/ trial of IV fluid.   If persists, may need consider compression stockings + pharmacotherapy with flurinef 0.1 mg daily or midodrine.  Long term would benefit from reconsideration of use of amitriptyline.  Cont rivaroxiban for DVT prophylasix, given prior hx.      Maxi Hinkle MD

## 2022-04-11 PROBLEM — Z96.659 STATUS POST TOTAL KNEE REPLACEMENT, UNSPECIFIED LATERALITY: Status: ACTIVE | Noted: 2022-01-01

## 2022-04-11 PROBLEM — Z96.651 STATUS POST TOTAL RIGHT KNEE REPLACEMENT: Status: ACTIVE | Noted: 2022-01-01

## 2022-04-11 NOTE — PROGRESS NOTES
[facility]    Medicine Progress Note - Teaching Service  Date of Admission:  4/8/2022    Assessment & Plan          81-year-old female with a history of Parkinson's disease, osteoarthritis, and postop DVT and PE admitted on 4/8/2022 for right TKA.  She is POD 3.  Hospital course has been complicated by dizziness precluding participation in PT.       Osteoarthritis of right knee s/p right total knee arthroplasty 4/8/2022  h/o postop DVT & PE  Pain is better controlled on PO Dilaudid. Has not been able to participate in PT due to ongoing dizziness, hopes to participate today.  Aspirin was discontinued postop.    Xarelto 10 mg at bedtime x 14 days    Pain control per orthopedics, Dilaudid ordered as needed    Ambulate as tolerated    PT today       Orthostatic hypotension  Likely multifactorial in the setting of blood loss during surgery, chronic use of amitriptyline, known Parkinson's disease, dehydration, 3-pt drop in Hgb postop.      Check Hgb, transfuse if <9 and symptomatic    Continue IV fluids with LR at 100 cc/h    Encourage PO    Vital signs q4h    SCDs, MARLENE stockings       Symptomatic anemia due to blood loss    Continue ferrous sulfate 325 mg daily, consider every other day dosing        H/o hypertension  Labile blood pressures  On no antihypertensive PTA.  Fluctuating and labile BPs postop between 179/82 and 71/51 (while standing) during this hospital stay, likely from dysautonomia in the setting of Parkinson's disease.    Maintain hydration as above    Follow fluid status    Vital signs every 4 hours    Treat pain as above    Recheck BP 15 minutes after unusually high or low BP        Chronic medical conditions    Parkinson's disease: Continue PTA carbidopa levodopa    Migraines: Hold PTA acetaminophen-caffeine-butalbital-aspirin (flurinol) while taking Xarelto; try acetaminophen alone for migraines    Mood disorder, insomnia: Continue PTA fluoxetine, risperidone,  melatonin    Amitriptyline on hold for orthostatic hypotension    GERD: PTA famotidine    Continue PTA eye drops           Diet: Advance Diet as Tolerated: Regular Diet Adult  Discharge Instruction - Regular Diet Adult  Diet    DVT Prophylaxis: xarelto  Burger Catheter: Not present  Fluids: LR @@ 100 ml/hr  Central Lines: None  Cardiac Monitoring: None  Code Status: Full Code        Disposition Plan   Expected Discharge: 04/12/2022     Anticipated discharge location: inpatient rehabilitation facility (TCU )         The patient's care was discussed with the Attending Physician, Dr. Crabtree.    Tenisha Kunz MD   [facility]  Securely message with the Vocera Web Console (learn more here)  Text page via [employer] Paging/Directory         ______________________________________________________________________    Interval History   Pain is better on Dilaudid.  Still dizzy, but thinks it's a little better.  No HA, SOB, CP.     Data reviewed today: I reviewed all medications, new labs and imaging results over the last 24 hours. I personally reviewed no images or EKG's today.    Physical Exam   Vital Signs: Temp: 98  F (36.7  C) Temp src: Oral BP: 119/57 Pulse: 90   Resp: 18 SpO2: 96 % O2 Device: None (Room air) Oxygen Delivery: 1.5 LPM  Weight: 135 lbs 0 oz  Constitutional:       Awake. NAD  Cardiovascular:      RRR. No edema.   Neurological:      Mental Status: She is alert. Mental status is at baseline.    MSK: RLE dressing c/d/i.  Intact sensation below the knee.     Data   Recent Labs   Lab 04/10/22  0759 04/09/22  1040 04/09/22  0945 04/09/22  0614 04/04/22  1630   WBC  --   --   --   --  8.3   HGB 9.0*  --   --  9.0* 13.4   MCV  --   --   --   --  96   PLT  --   --   --   --  195   NA  --   --   --   --  140   POTASSIUM  --   --   --   --  4.1   CHLORIDE  --   --   --   --  107   CO2  --   --   --   --  31   BUN  --   --   --   --  20   CR  --   --   --   --  0.79   ANIONGAP  --    --   --   --  2*   JONNY  --   --   --   --  8.4*   GLC  --  154* 88  --  80     No results found for this or any previous visit (from the past 24 hour(s)).

## 2022-04-11 NOTE — CONSULTS
"ACUPUNCTURIST TREATMENT NOTE    Name: Laurie Orosco  :  1940  MRN:  [id number]    Acupuncture Treatment  Patient Type: Orthopedic  Intervention Reason: Pain  Pain Location: (R) knee  Pre-session Pain Ratin  Post-session Pain Ratin  Patient complaint:: (R) knee pain  Initial insertions: Yin monterroso, (R) scalp stephanie x1, (L) elbow stephanie x1, (L) Sp 9, 10, (L) St 34, 36     \"Risks and benefits of acupuncture were discussed with patient. Consent for treatment was given. We thank you for the referral.\"     Immanuel Mena    Date:  2022  Time:  12:42 PM    "

## 2022-04-11 NOTE — PLAN OF CARE
Goal Outcome Evaluation:      Patient vital signs are at baseline: Yes  Patient able to ambulate as they were prior to admission or with assist devices provided by therapies during their stay:  Yes, however, pt reports increase pain. Pt was able to ambulate with PT with encouragements.   Patient MUST void prior to discharge:  Yes  Patient able to tolerate oral intake:  Yes  Pain has adequate pain control using Oral analgesics:  Yes  Does patient have an identified :  No,  Reason:  Discharge plan is for TCU. Awaiting placement. Referrals sent.   Has goal D/C date and time been discussed with patient:  Yes    Pt is A&Ox4. Pt was placed on knee CPM from 2000 and 2200. Suppository given due to no BM for 5 days. Milk of mg not effective.

## 2022-04-11 NOTE — PROGRESS NOTES
Recheck hemoglobin 8.2 this afternoon.    Pt remains profoundly symptomatic with orthostatic hypotension & lightheadedness.  Has not been able to participate in PT    Obtained written informed consent for blood transfusion. Risks and benefits discussed with patient and she agrees to proceed.   We called her son Shawn and discussed with him and answered his questions with patient present.  He agrees.      - 1 U pRBCs  - check hgb 1 hr after blood      ----  Lula Miller MD  PGY-3  Family Medicine Resident

## 2022-04-11 NOTE — PROGRESS NOTES
Care Management Follow Up    Length of Stay (days): 0    Expected Discharge Date: 04/12/2022     Concerns to be Addressed:     Medical progression  Patient plan of care discussed at interdisciplinary rounds: Yes    Anticipated Discharge Disposition:  [facility]     Anticipated Discharge Services:  TCU  Anticipated Discharge DME:  Per treatment team     Patient/family educated on Medicare website which has current facility and service quality ratings:  yes  Education Provided on the Discharge Plan:  yes  Patient/Family in Agreement with the Plan: yes    Referrals Placed by CM/SW:  TCU  Private pay costs discussed: not applicable at this time    Additional Information:    8:55 AM  WIL spoke with Tereza at [facility] (pager: [phone]) who reports they can accept Pt for TCU as soon as Pt is medically ready.  WIL connecting with MD to determine if Pt is medically ready for discharge.     1:54 PM  WIL discussed updates with Resident team, due to Pt's blood pressure, CM will need to check in with MD in the morning of 4/12 to determine if Pt will be ready for discharge.  WIL called and spoke with Tereza in admissions who states they will have a bed for Pt whenever she is ready, facility simply requests to be updated.   WIL completed PAS ([id number]).    AYANNA Hurtado     Consults signed by Tran Garcia DO at 18 09:09 AM      Author:  Tran Garcia DO Service:  (none) Author Type:  Physician     Filed:  18 11:46 AM Encounter Date:  2018 Status:  Signed     :  Tran Garcia DO (Physician)                                                [place] [address]  [address]                                      [facility] [address]  [address]    NAME: FLORENTIN YAÑEZ                                           /AGE/SEX: 2000 - 17 - M  PHYSICIAN: Tran Garcia D.O.                                    ADMIT DATE:  UNIT #: [id number]                                                DIS DATE:  ACCT #: [id number]                                              LOC//BED: [id number]                                              REPORT # : [id number]  DATE OF CONSULTATION: 2018  REASON FOR CONSULTATION:  Right testicular torsion.    HISTORY OF PRESENT ILLNESS:  The patient is a 17-year-old male who presented to the emergency department with severe right  testicular pain.  An ultrasound was performed of the testicle, which demonstrates no flow to the  right testicle.  The left testicle is normal with normal blood flow.  I was contacted by the  emergency department for treatment of this.  The patient stated that he had pain beginning at 8:30  p.m. on 2018 and later came to the emergency department.  His pain is improved from when he  initially presented.    PAST MEDICAL HISTORY:  Denies.    PAST SURGICAL HISTORY:  Denies.    SOCIAL HISTORY:  Denies the use of tobacco, alcohol, or drugs.    FAMILY HISTORY:  Denies any significant family history.    MEDICATIONS:  Denies.    ALLERGIES:  No known drug  allergies.    REVIEW OF SYSTEMS:  Otherwise, negative except for those listed in history of present illness.    PHYSICAL EXAMINATION:  VITAL SIGNS:  Temperature 98.9, pulse 86, respirations 16, blood pressure 138/80.  GENERAL:  The patient is alert and oriented x3, in no acute distress, cooperative.  LUNGS:  Respirations are without laboring.  ABDOMEN:  Soft, nontender, nondistended.  There is no organomegaly noted.  EXTREMITIES:  No clubbing, cyanosis or edema.  SCROTAL EXAM:  Right testicle is tender to touch and indurated.  Left testicle is normal.    Ultrasound, as per history of present illness, with no flow to the right testicle.    IMPRESSION:  Right testicular torsion.    RECOMMENDATIONS:  The patient will go to the operating room emergently for scrotal exploration, possible bilateral o  rchiopexy, possible right orchiectomy.  The procedure was discussed in detail with the patient's  mother.  The patient will receive IV Ancef preoperatively.  Confirmation #: [id number]  Dictation ID: [id number]    DICTATED: Tran Garcia D.O.    <Electronically signed by Tran Garcia D.O.>    Tran Garcia D.O.  08/09/18 0908    S: Signed  D: 08/01/18 0140  T: 08/01/18 0546 TRN    REPORT#: [id number]        CC: Tran Garcia D.O.                                              REPORT STATUS: Signed                                                   of 2  [LR1.1M]    Revision History        User Key Date/Time User Provider Type Action    > LR1.1 08/09/18 11:46 AM Tran Garcia DO Physician Sign    M - Manual

## 2022-04-11 NOTE — UTILIZATION REVIEW
Admission Status; Secondary Review Determination       Under the authority of the Utilization Management Committee, the utilization review process indicated a secondary review on the above patient. The review outcome is based on review of the medical records, discussions with staff, and applying clinical experience noted on the date of the review.     (x) Inpatient Status Appropriate - This patient's medical care is consistent with medical management for inpatient care and reasonable inpatient medical practice.     RATIONALE FOR DETERMINATION     Ms. Orosco is a 80 yo female with a PMH of parkinson's disease and post-op DVT who presents to the hospital for elective right TKA.  Post-op she has had significant difficulty with dizziness and orthostatic hypotension despite ongoing IVF.  Hgb is dropping today and she is being transfused for symptomatic anemia.  She has been unable to participate in PT d/t her severe symptoms; this is an unexpected post-op course that is requiring ongoing hospital treatment and monitoring.     I have a page out to Dr. Kunz who is rounding on this patient today; awaiting call back.      At the time of admission with the information available to the attending physician more than 2 nights Hospital complex care was anticipated, based on patient risk of adverse outcome if treated as outpatient and complex care required. Inpatient admission is appropriate based on the Medicare guidelines.     The information on this document is developed by the utilization review team in order for the business office to ensure compliance. This only denotes the appropriateness of proper admission status and does not reflect the quality of care rendered.   The definitions of Inpatient Status and Observation Status used in making the determination above are those provided in the CMS Coverage Manual, Chapter 1 and Chapter 6, section 70.4.         Sincerely,     Tana Delong, DO  Utilization  Review  Physician Advisor  [facility].

## 2022-04-11 NOTE — PLAN OF CARE
Goal Outcome Evaluation:  Patient vital signs are at baseline: Yes  Patient able to ambulate as they were prior to admission or with assist devices provided by therapies during their stay:  Yes  Patient MUST void prior to discharge:  Yes  Patient able to tolerate oral intake:  Yes  Pain has adequate pain control using Oral analgesics:  Yes  Does patient have an identified :  No,  Reason:  Awaiting TCU placement  Has goal D/C date and time been discussed with patient:  Yes     Peripheral IV to left distal arm removed due to infiltration. Redness noted but patient denied pain in left arm. Left arm elevated on pillow.

## 2022-04-12 NOTE — PLAN OF CARE
Status 1384-2245:    Patient is alert and oriented to self. Reoriented to environment with rounds overnight. Bed alarm is on and call light is within reach. Managing mild pain in RLE with scheduled tylenol, rest, repositioning and quiet environment. Dressing remains CDI. Patient has been accepted to TCU and will discharge pending medical clearance.

## 2022-04-12 NOTE — PROGRESS NOTES
Patient tolerated PT today.  Dizziness likely due to acute blood loss anemia.  OK to discharge from medicine standpoint.  OK to resume amitriptyline. Continue iron supplementation MWF, recheck Hgb in 3 months.     Discussed at rounds with Dr. Crabtree.    Tenisha Kunz MD on 4/12/2022 at 2:12 PM  Securely message with the Vocera Web Console (learn more here)  Text page via [facility] Paging/Directory

## 2022-04-12 NOTE — PHARMACY
"Orthopedic Progress Note      Assessment: 4 Days Post-Op  S/P Procedure(s):  RIGHT TOTAL KNEE ARTHROPLASTY     Plan:   - Continue PT/OT  - Weightbearing status: WBAT  - Anticoagulation: xarelto in addition to SCDs, abilio stockings and early ambulation.  - Discharge planning: TCU today, okay to discharge whenever. Hgb and hypotensive sxs improved, improved with PT.  -followup in 2 weeks from surgery at [facility] with Dr. Nickerson.     Subjective:  Patient reports she is doing better today.  States she has more energy, did better with therapy, much less dizzy.  Denies numbness tingling in the leg.  Denies calf pain.  States she still has ongoing pain in the right knee but is tolerable with pain medications.  Patient did not know she was going to TCU at 4 PM today but is okay with plan.    Objective:  /72 (BP Location: Left arm)   Pulse 90   Temp 98.1  F (36.7  C) (Oral)   Resp 18   Ht 1.575 m (5' 2\")   Wt 61.2 kg (135 lb)   SpO2 94%   BMI 24.69 kg/m    The patient is A&Ox3. Appears comfortable.   Sensation is intact RLE.   Dorsiflexion and plantar flexion is intact.  Dorsalis pedis pulse intact.  Calves are soft and non-tender.   The incision is covered. Dressing has unchanged small amount of dried blood distal aspect, otherwise c/d/i.       Pertinent Labs   Lab Results: personally reviewed.   Lab Results   Component Value Date    INR 0.98 05/27/2021    INR 1.13 03/29/2012    INR 2.70 (H) 03/27/2012     Lab Results   Component Value Date    WBC 8.3 04/04/2022    HGB 9.5 (L) 04/12/2022    HCT 42.0 04/04/2022    MCV 96 04/04/2022     04/04/2022     Lab Results   Component Value Date     04/04/2022    CO2 31 04/04/2022         Report completed by:  Eleanor Mena PA-C, ROBIN  Date: 4/12/2022  Time: 2:36 PM    "

## 2022-04-12 NOTE — PROGRESS NOTES
[facility]    Medicine Progress Note - Hospitalist Service    Date of Admission:  4/8/2022    Assessment & Plan          81-year-old female with a history of Parkinson's disease, osteoarthritis, and postop DVT and PE admitted on 4/8/2022 for right TKA.   Hospital course has been complicated by dizziness precluding participation in PT.        Osteoarthritis of right knee s/p right total knee arthroplasty 4/8/2022, POD 4  h/o postop DVT & PE  Pain well controlled with current regimen.  Has not been able to do the PT due to dizziness (see below) which is now resolved.    Anticoag: Xarelto 10 mg at bedtime x 14 days, started 4/8 d/t h/o DVT/ PE ppx    Pain control per orthopedics, Dilaudid prn    Ambulate as tolerated    PT today-- can discharge to TCU when able to fully participate in PT       Orthostatic hypotension/ Dizziness  Symptomatic anemia due to blood loss s/p transfusion 1U pRBCs  Likely multifactorial in the setting of blood loss during surgery, chronic use of amitriptyline, known Parkinson's disease, dehydration, 3-pt drop in Hgb postop.  Symptoms significantly improved after 1U pRBCs given 4/11.    PTA amytriptilline held evening of 4/11 -- consider discontinue on discharge givne high risk of orthostatic hypotension in elderly    Consider replace amitriptyline with mirtazapine as outpatient    Encourage oral intake    Discontinue IV fluids    Vital signs q8h    Ferrous sulfate 325 mg daily - started inpatient d/t blood loss anemia      H/o hypertension   PTA does not take any antihypertensive as outpatient.  BP well-controlled here inpatient despite orthostatic hypotension as above     Chronic medical conditions    Parkinson's disease: Continue PTA carbidopa levodopa    Migraines: Hold PTA acetaminophen-caffeine-butalbital-aspirin (flurinol) while taking Xarelto; try acetaminophen alone for migraines    Mood disorder, insomnia: Continue PTA fluoxetine, risperidone,  melatonin  ? Amitriptyline on hold for orthostatic hypotension    GERD: PTA famotidine    Continue PTA eye drops         Diet: Advance Diet as Tolerated: Regular Diet Adult  Discharge Instruction - Regular Diet Adult  Diet    DVT Prophylaxis: DOAC  Burger Catheter: Not present  Central Lines: None  Cardiac Monitoring: None  Code Status: Full Code      Disposition Plan   Expected Discharge: 04/12/2022     Anticipated discharge location: inpatient rehabilitation facility (TCU )    Delays:  not able to do PT       The patient's care was discussed with the Attending Physician, Dr. Nisha Crabtree.    Lula Miller MD  Hospitalist Service  [facility]  Securely message with the Vocera Web Console (learn more here)  Text page via [employer] Paging/Directory         Clinically Significant Risk Factors Present on Admission   None                   ______________________________________________________________________    Interval History   Doing much better today.  Denies lightheadedness on standing.  Ate breakfast.  Got 1U pRBCs yesterday late afternoon.  Looking forward to PT today.    RNs do report hallucinations overnight, but resolved this morning.    Denies chest pain, shortness of breath.  Pain is well controlled with prescribed meds.    Data reviewed today: I reviewed all medications, new labs and imaging results over the last 24 hours.     Physical Exam   Vital Signs: Temp: 98.1  F (36.7  C) Temp src: Oral BP: 128/72 Pulse: 90   Resp: 18 SpO2: 94 % O2 Device: None (Room air)    Weight: 135 lbs 0 oz    General:  Normal appearance, no apparent distress, appears stated age  Cardiovascular: Normal rate and regular rhythm, normal heart sounds  Lungs: Pulmonary effort is normal, normal breath sounds  Extremities: no lower extremity edema; R knee wrapped in bandage, good ROM, non TTP  Pulses:  2+ radial, dorsalis pedis  Skin: Warm and dry. No concerning rashes or lesions.  Neurologic: No focal  deficit, alert and oriented x3  Psychiatric: normal mood and affect, cooperative      Data   Recent Labs   Lab 04/12/22  0945 04/12/22  0600 04/11/22  1457 04/11/22  1215 04/10/22  0759 04/09/22  1040 04/09/22  0945   HGB  --  9.5* 8.7* 8.2*   < >  --   --    *  --   --   --   --  154* 88    < > = values in this interval not displayed.

## 2022-04-12 NOTE — PLAN OF CARE
Pt A&Ox4. Vital signs stable. Neuros intact. Pt up to chair. PRN pain medication given for right knee pain. Pt verbalized relief.  Discharge orders in. Pt discharging to a TCU. Pt's son will be taking pt to [facility]. Pt wheeled out to front entrance. Safety precautions maintained. All belongings at pt's side. IV removed.

## 2022-04-12 NOTE — PROGRESS NOTES
Care Management Discharge Note    Discharge Date: 04/12/2022       Discharge Disposition: Transitional Care    Discharge Services: None    Discharge DME: None    Discharge Transportation: family or friend will provide    Private pay costs discussed: Not applicable    PAS Confirmation Code:  ([id number])  Patient/family educated on Medicare website which has current facility and service quality ratings:      Education Provided on the Discharge Plan:    Persons Notified of Discharge Plans: pt/son/TCU  Patient/Family in Agreement with the Plan: yes    Handoff Referral Completed: Yes    Additional Information:  2:15 PM  [facility]  WIL spoke with son and he will transport at 4pm.  WIL updated Tereza at [facility].  [facility] will fax discharge orders.  No further discharge needs.        DILLON Fan

## 2022-04-12 NOTE — DISCHARGE SUMMARY
ORTHOPEDIC DISCHARGE SUMMARY       Laurie Orosco,  1940, MRN [id number]    Admission Date: 2022  Admission Diagnoses: Primary osteoarthritis of right knee [M17.11]  S/P total knee arthroplasty [Z96.659]  Status post total knee replacement, unspecified laterality [Z96.659]     Discharge Date:  22    Post-operative Day:  4 Days Post-Op    Reason for Admission: The patient was admitted for the following:  Procedure(s):  RIGHT TOTAL KNEE ARTHROPLASTY      BRIEF HOSPITAL COURSE   This 81 year old female underwent the aforementioned procedure with Dr. Nickerson on 22. There were no intraoperative complications and the patient was transferred to the recovery room and later the orthopedic unit in stable condition. Once the patient reached the orthopedic floor our orthopedic pain protocol was implemented along with the following:    Anticoagulation Medications: Xarelto  Therapy: PT and OT  Activity: WBAT  Bracing: None    Consultations during Admission: Hospitalist service for medical management     COMPLICATIONS/SIGNIFICANT FINDINGS    During patient's postop hospital course, she struggled with orthostatic hypotension and a drop in hemoglobin.  She was struggling with dizziness, lightheadedness, weakness, fatigue, and difficulty working with therapy.  Hemoglobin dropped again from 9.0-8.2 yesterday and was given 1 unit of blood transfusion.  Patient has significantly improved today.    DISCHARGE INFORMATION   Condition at discharge: Good  Discharge destination: Skilled nursing facility  Patient was seen by myself on the date of discharge.    FOLLOW UP CARE   Follow up with orthopedics in 2 weeks or sooner should the need arise. Ortho will continue to manage pain control, post op anticoagulation and incision care.     Follow up with your PCP for management of chronic medical problems and to evaluate for post op medical complications including constipation, nausea/vomiting, DVT/PE, anemia, changes in  "blood pressure, fevers/chills, urinary retention and atelectasis/pneumonia.     Subjective   Laurie has been struggling with dizziness and lightheadedness and low hemoglobin.  Today patient states that since her transfusion yesterday she feels like she has more energy, is no longer dizzy, and has been able to work with therapy during her session this morning.  Patient denies any numbness tingling in the leg.  States she has ongoing right knee pain but manageable with current pain medication regimen.  Has no other complaints feels ready for discharge to TCU.      Physical Exam   The patient is A&Ox3.  Sensation is intact RLE.   Dorsiflexion and plantar flexion is intact.  Dorsalis pedis pulse intact.  Calves are soft and non-tender.   The incision is covered. Dressing C/D/I  edema and ecchymosis right knee, appropriate for post op       /72 (BP Location: Left arm)   Pulse 90   Temp 98.1  F (36.7  C) (Oral)   Resp 18   Ht 1.575 m (5' 2\")   Wt 61.2 kg (135 lb)   SpO2 94%   BMI 24.69 kg/m      Pertinent Results at Discharge     Hemoglobin   Date/Time Value Ref Range Status   04/12/2022 06:00 AM 9.5 (L) 11.7 - 15.7 g/dL Final   04/11/2022 02:57 PM 8.7 (L) 11.7 - 15.7 g/dL Final   04/11/2022 12:15 PM 8.2 (L) 11.7 - 15.7 g/dL Final   05/27/2021 04:53 PM 13.0 11.7 - 15.7 g/dL Final   05/11/2021 12:23 PM 12.9 11.7 - 15.7 g/dL Final   11/25/2019 12:49 PM 14.1 11.7 - 15.7 g/dL Final     INR   Date/Time Value Ref Range Status   05/27/2021 04:53 PM 0.98 0.86 - 1.14 Final   03/29/2012 06:35 AM 1.13 0.86 - 1.14 Final   03/27/2012 06:58 AM 2.70 (H) 0.86 - 1.14 Final     Platelet Count   Date/Time Value Ref Range Status   04/04/2022 04:30  150 - 450 10e3/uL Final   05/27/2021 04:53  150 - 450 10e9/L Final   05/11/2021 12:23  150 - 450 10e9/L Final   11/25/2019 12:49  150 - 450 10e9/L Final       Problem List   Active Problems:    S/P total knee arthroplasty    Status post total knee replacement, " unspecified laterality    Status post total right knee replacement        Eleanor Mena PA-C  Date: 4/12/2022  Time: 2:52 PM

## 2022-04-12 NOTE — PLAN OF CARE
Occupational Therapy Discharge Summary    Reason for therapy discharge:    Discharged to transitional care facility.    Progress towards therapy goal(s). See goals on Care Plan in [facility] electronic health record for goal details.  Goals not met.  Barriers to achieving goals:   discharge from facility.    Therapy recommendation(s):    Continued therapy is recommended.  Rationale/Recommendations:  not yet at baseline for ADLs.

## 2022-04-13 NOTE — PROGRESS NOTES
Physical Therapy Discharge Summary    Reason for therapy discharge:    Discharged to transitional care facility.    Progress towards therapy goal(s). See goals on Care Plan in [facility] electronic health record for goal details.  Goals partially met.  Barriers to achieving goals:   limited tolerance for therapy.    Therapy recommendation(s):    Continued therapy is recommended.  Rationale/Recommendations:  Not at functional mobility baseline.

## 2022-05-05 NOTE — TELEPHONE ENCOUNTER
Received forms from [facility].  Date 5/4/22. Discharge summary  Placed forms in JQ box to review and sign  Fax completed form to [phone]    Samia Mckay-  Demario Akins

## 2022-05-12 NOTE — TELEPHONE ENCOUNTER
Forms received from [facility]/ Home Health Cert and Plan of Care (cert period 04/29/22-06/27/22)  for Sunita Brito NP.  Forms placed in provider 'sign me' folder.  Please fax forms to [phone] after completion.    REYNA Loredo  [facility]

## 2022-05-26 NOTE — TELEPHONE ENCOUNTER
Received forms from [facility].  Effective date 5/25 discharge from PT    Placed forms in JQ box to review and sign  Fax back to [phone]    Samia Mckay-  Demario Akins

## 2022-05-31 NOTE — TELEPHONE ENCOUNTER
Received forms from [facility].  Home Health Cert. And POC cert period 4/29-6/27    Placed in JQ box to review and sign  Fax completed forms to [phone]    Samia Mckay-  Demario Akins

## 2022-06-28 NOTE — TELEPHONE ENCOUNTER
Prescription approved per [facility] Refill Protocol.    Kathryn Trejo RN

## 2022-06-28 NOTE — TELEPHONE ENCOUNTER
Routing to team. Please assist with scheduling annual visit. Has not been seen for routine visit in over a year    Kathryn Trejo RN

## 2022-07-25 NOTE — TELEPHONE ENCOUNTER
Reason for Call:  Medication or medication refill:  Received a fax request from the pharmacy.    Do you use a [facility]?  Name of the pharmacy and phone number for the current request:  [address], [place] [phone]    Name of the medication requested: famotidine (PEPCID) 20 MG tablet quantity 180    Other request:     Can we leave a detailed message on this number? Not Applicable    Phone number patient can be reached at:     Best Time:     Call taken on 7/25/2022 at 11:31 AM by Liss Dobson

## 2022-09-12 NOTE — TELEPHONE ENCOUNTER
Emailed Notification of a  Patient form to [email or web address]    REYNA Loredo  [facility]

## 2022-09-12 NOTE — TELEPHONE ENCOUNTER
Reviewed in CareEverywhere that patient was at [facility] and was pronounced dead at 02:35 AM on 09/11/2022.    Routing to team so update in Epic can be made.    Sunita Brito, DNP, APRN, CNP

## (undated) DEVICE — SOL NACL 0.9% IRRIG 1000ML BOTTLE 2F7124

## (undated) DEVICE — DRSG AQUACEL AG HYDROFIBER  3.5X10" 422605

## (undated) DEVICE — ALCOHOL ISOPROPYL 4 OZ 70% IA7004

## (undated) DEVICE — SOL NACL 0.9% INJ 1000ML BAG 2B1324X

## (undated) DEVICE — HOLDER LIMB VELCRO OR 0814-1533

## (undated) DEVICE — CUFF TOURN 24IN STRL DISP

## (undated) DEVICE — PREP POVIDONE-IODINE 7.5% SCRUB 4OZ BOTTLE MDS093945

## (undated) DEVICE — SUCTION MANIFOLD NEPTUNE 2 SYS 4 PORT 0702-020-000

## (undated) DEVICE — PLATE GROUNDING ADULT W/CORD 9165L

## (undated) DEVICE — SUTURE VICRYL+ 1 27IN CT-1 UND VCP261H

## (undated) DEVICE — GLOVE BIOGEL PI SZ 8.5 40885

## (undated) DEVICE — TRAY PREP DRY SKIN SCRUB 067

## (undated) DEVICE — GLOVE BIOGEL INDICATOR 7.5 LF 41675

## (undated) DEVICE — SU MONOCRYL 3-0 PS-2 18" UND MCP497G

## (undated) DEVICE — ADH SKIN CLOSURE PREMIERPRO EXOFIN 1.0ML 3470

## (undated) DEVICE — SUTURE VICRYL+ 2-0 27IN CT-1 UND VCP259H

## (undated) DEVICE — GLOVE UNDER INDICATOR PI SZ 8.5 LF 41685

## (undated) DEVICE — SUTURE VICRYL+ 0 27IN CT-1 UND VCP260H

## (undated) DEVICE — DECANTER VIAL 2006S

## (undated) DEVICE — SU STRATAFIX PDS PLUS 1 CT-1 18" SXPP1A404

## (undated) DEVICE — BANDAGE ELASTIC 6X550 LF DBL 593-96LF

## (undated) DEVICE — GLOVE SURG PI ULTRA TOUCH M SZ 7 LF 42670

## (undated) DEVICE — CUSTOM PACK TOTAL KNEE ACCESSORY SOP5BTAHEA

## (undated) DEVICE — PREP CHLORAPREP 26ML TINTED HI-LITE ORANGE 930815

## (undated) DEVICE — SOL WATER IRRIG 1000ML BOTTLE 2F7114

## (undated) DEVICE — BLADE SAW SAGITTAL STRK DUAL CUT 4118-135-090

## (undated) DEVICE — CUSTOM PACK TOTAL KNEE SOP5BTKHEC

## (undated) DEVICE — A3 SUPPLIES- SEE NURSING INFO PAGE

## (undated) DEVICE — GOWN IMPERVIOUS BREATHABLE SMART LG 89015

## (undated) RX ORDER — PROPOFOL 10 MG/ML
INJECTION, EMULSION INTRAVENOUS
Status: DISPENSED
Start: 2022-01-01

## (undated) RX ORDER — ONDANSETRON 2 MG/ML
INJECTION INTRAMUSCULAR; INTRAVENOUS
Status: DISPENSED
Start: 2022-01-01

## (undated) RX ORDER — LIDOCAINE HYDROCHLORIDE 10 MG/ML
INJECTION, SOLUTION EPIDURAL; INFILTRATION; INTRACAUDAL; PERINEURAL
Status: DISPENSED
Start: 2022-01-01

## (undated) RX ORDER — DEXAMETHASONE SODIUM PHOSPHATE 4 MG/ML
INJECTION, SOLUTION INTRA-ARTICULAR; INTRALESIONAL; INTRAMUSCULAR; INTRAVENOUS; SOFT TISSUE
Status: DISPENSED
Start: 2022-01-01

## (undated) RX ORDER — FENTANYL CITRATE 50 UG/ML
INJECTION, SOLUTION INTRAMUSCULAR; INTRAVENOUS
Status: DISPENSED
Start: 2022-01-01